# Patient Record
Sex: MALE | Race: WHITE | NOT HISPANIC OR LATINO | Employment: OTHER | URBAN - METROPOLITAN AREA
[De-identification: names, ages, dates, MRNs, and addresses within clinical notes are randomized per-mention and may not be internally consistent; named-entity substitution may affect disease eponyms.]

---

## 2017-05-17 ENCOUNTER — LAB VISIT (OUTPATIENT)
Dept: LAB | Facility: HOSPITAL | Age: 67
End: 2017-05-17
Attending: FAMILY MEDICINE
Payer: COMMERCIAL

## 2017-05-17 DIAGNOSIS — E78.5 HYPERLIPIDEMIA, UNSPECIFIED HYPERLIPIDEMIA TYPE: ICD-10-CM

## 2017-05-17 LAB
ALBUMIN SERPL BCP-MCNC: 3.9 G/DL
ALP SERPL-CCNC: 62 U/L
ALT SERPL W/O P-5'-P-CCNC: 24 U/L
ANION GAP SERPL CALC-SCNC: 8 MMOL/L
AST SERPL-CCNC: 19 U/L
BILIRUB SERPL-MCNC: 0.6 MG/DL
BUN SERPL-MCNC: 21 MG/DL
CALCIUM SERPL-MCNC: 9.2 MG/DL
CHLORIDE SERPL-SCNC: 109 MMOL/L
CHOLEST/HDLC SERPL: 4.8 {RATIO}
CO2 SERPL-SCNC: 25 MMOL/L
CREAT SERPL-MCNC: 0.9 MG/DL
EST. GFR  (AFRICAN AMERICAN): >60 ML/MIN/1.73 M^2
EST. GFR  (NON AFRICAN AMERICAN): >60 ML/MIN/1.73 M^2
GLUCOSE SERPL-MCNC: 98 MG/DL
HDL/CHOLESTEROL RATIO: 20.8 %
HDLC SERPL-MCNC: 168 MG/DL
HDLC SERPL-MCNC: 35 MG/DL
LDLC SERPL CALC-MCNC: 114 MG/DL
NONHDLC SERPL-MCNC: 133 MG/DL
POTASSIUM SERPL-SCNC: 3.9 MMOL/L
PROT SERPL-MCNC: 6.8 G/DL
SODIUM SERPL-SCNC: 142 MMOL/L
TRIGL SERPL-MCNC: 95 MG/DL

## 2017-05-17 PROCEDURE — 80061 LIPID PANEL: CPT

## 2017-05-17 PROCEDURE — 36415 COLL VENOUS BLD VENIPUNCTURE: CPT

## 2017-05-17 PROCEDURE — 80053 COMPREHEN METABOLIC PANEL: CPT

## 2017-05-27 DIAGNOSIS — I10 HYPERTENSION, ESSENTIAL, BENIGN: Chronic | ICD-10-CM

## 2017-05-29 RX ORDER — AMLODIPINE AND BENAZEPRIL HYDROCHLORIDE 10; 40 MG/1; MG/1
1 CAPSULE ORAL DAILY
Qty: 90 CAPSULE | Refills: 1 | Status: SHIPPED | OUTPATIENT
Start: 2017-05-29 | End: 2017-11-24 | Stop reason: SDUPTHER

## 2017-05-30 ENCOUNTER — OFFICE VISIT (OUTPATIENT)
Dept: INTERNAL MEDICINE | Facility: CLINIC | Age: 67
End: 2017-05-30
Payer: COMMERCIAL

## 2017-05-30 VITALS
DIASTOLIC BLOOD PRESSURE: 80 MMHG | TEMPERATURE: 98 F | OXYGEN SATURATION: 93 % | BODY MASS INDEX: 29.76 KG/M2 | WEIGHT: 185.19 LBS | HEART RATE: 65 BPM | SYSTOLIC BLOOD PRESSURE: 152 MMHG | RESPIRATION RATE: 16 BRPM | HEIGHT: 66 IN

## 2017-05-30 DIAGNOSIS — Z72.0 TOBACCO USE: Chronic | ICD-10-CM

## 2017-05-30 DIAGNOSIS — I10 HYPERTENSION, ESSENTIAL, BENIGN: Primary | Chronic | ICD-10-CM

## 2017-05-30 DIAGNOSIS — E78.5 HYPERLIPIDEMIA WITH TARGET LOW DENSITY LIPOPROTEIN (LDL) CHOLESTEROL LESS THAN 160 MG/DL: Chronic | ICD-10-CM

## 2017-05-30 PROCEDURE — 99214 OFFICE O/P EST MOD 30 MIN: CPT | Mod: S$GLB,,, | Performed by: NURSE PRACTITIONER

## 2017-05-30 PROCEDURE — 99999 PR PBB SHADOW E&M-EST. PATIENT-LVL III: CPT | Mod: PBBFAC,,, | Performed by: NURSE PRACTITIONER

## 2017-05-30 NOTE — PATIENT INSTRUCTIONS
Continue current blood pressure medications and keep log of pressures. Check blood pressure 30-45 minutes after taking metoprolol (BP and heart rate). Bring to next appointment.
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
## 2017-06-05 ENCOUNTER — PATIENT MESSAGE (OUTPATIENT)
Dept: ADMINISTRATIVE | Facility: OTHER | Age: 67
End: 2017-06-05

## 2017-06-07 ENCOUNTER — TELEPHONE (OUTPATIENT)
Dept: FAMILY MEDICINE | Facility: CLINIC | Age: 67
End: 2017-06-07

## 2017-06-07 DIAGNOSIS — I10 ESSENTIAL HYPERTENSION: Primary | ICD-10-CM

## 2017-06-07 RX ORDER — HYDROCHLOROTHIAZIDE 12.5 MG/1
12.5 TABLET ORAL DAILY
Qty: 30 TABLET | Refills: 0 | Status: SHIPPED | OUTPATIENT
Start: 2017-06-07 | End: 2017-07-04 | Stop reason: SDUPTHER

## 2017-06-07 NOTE — TELEPHONE ENCOUNTER
----- Message from Kristi Alexis NP sent at 6/7/2017  8:58 AM CDT -----  Regarding: notify patient please  Please call Mr. Blackmon today ([phone]) and notify him that I reviewed his BP log, discussed with Dr. Chávez, and have decided to add a small dose of diuretic to his HTN medications. HCTZ 12.5 daily and please schedule him to follow up with me in 2 weeks for BP review.     Thank you  SANTHOSH Bravo

## 2017-06-21 ENCOUNTER — OFFICE VISIT (OUTPATIENT)
Dept: INTERNAL MEDICINE | Facility: CLINIC | Age: 67
End: 2017-06-21
Payer: MEDICARE

## 2017-06-21 VITALS
HEART RATE: 63 BPM | TEMPERATURE: 99 F | DIASTOLIC BLOOD PRESSURE: 75 MMHG | SYSTOLIC BLOOD PRESSURE: 136 MMHG | BODY MASS INDEX: 29.83 KG/M2 | HEIGHT: 66 IN | WEIGHT: 185.63 LBS

## 2017-06-21 DIAGNOSIS — I10 HYPERTENSION, ESSENTIAL, BENIGN: Primary | Chronic | ICD-10-CM

## 2017-06-21 PROCEDURE — 99213 OFFICE O/P EST LOW 20 MIN: CPT | Mod: S$PBB,,, | Performed by: NURSE PRACTITIONER

## 2017-06-21 PROCEDURE — 99213 OFFICE O/P EST LOW 20 MIN: CPT | Mod: PBBFAC | Performed by: NURSE PRACTITIONER

## 2017-06-21 PROCEDURE — 99999 PR PBB SHADOW E&M-EST. PATIENT-LVL III: CPT | Mod: PBBFAC,,, | Performed by: NURSE PRACTITIONER

## 2017-06-21 NOTE — PROGRESS NOTES
Subjective:       Patient ID: Neymar Victoria is a 66 y.o. male.    Chief Complaint: Follow-up (2 week follow up blood pressure)    Patient presents to clinic today for followup of HTN. He brings in a home log with pressures ranging -144 and DBP 66-77. He denies headache, chest pain, or vision changes.         Review of Systems   Constitutional: Negative for chills, fatigue, fever and unexpected weight change.   Eyes: Negative for visual disturbance.   Respiratory: Negative for shortness of breath.    Cardiovascular: Negative for chest pain.   Musculoskeletal: Negative for myalgias.   Neurological: Negative for headaches.       Objective:      Physical Exam   Constitutional: He is oriented to person, place, and time. He appears well-developed and well-nourished. No distress.   HENT:   Head: Normocephalic and atraumatic.   Cardiovascular: Normal rate and regular rhythm.  Exam reveals no friction rub.    No murmur heard.  Pulmonary/Chest: Effort normal and breath sounds normal.   Neurological: He is alert and oriented to person, place, and time.   Skin: He is not diaphoretic.   Psychiatric: He has a normal mood and affect.   Vitals reviewed.      Assessment:       1. Hypertension, essential, benign        Plan:   Hypertension, essential, benign  Comments:  home readings average shows controlled HTN, continue current medications    Other orders  -     Cancel: zoster vaccine live, PF, (ZOSTAVAX, PF,) 19,400 unit/0.65 mL injection; Inject 19,400 Units into the skin once.  Dispense: 1 vial; Refill: 0      Discussed BP goals. Patient reports a history of not tolerating increased doses of HCTZ.   Return if symptoms worsen or fail to improve, for nurse visit for BP and to correlate home readings.

## 2017-07-04 ENCOUNTER — PATIENT MESSAGE (OUTPATIENT)
Dept: ADMINISTRATIVE | Facility: OTHER | Age: 67
End: 2017-07-04

## 2017-07-04 DIAGNOSIS — I10 ESSENTIAL HYPERTENSION: ICD-10-CM

## 2017-07-05 ENCOUNTER — CLINICAL SUPPORT (OUTPATIENT)
Dept: INTERNAL MEDICINE | Facility: CLINIC | Age: 67
End: 2017-07-05
Payer: MEDICARE

## 2017-07-05 VITALS — SYSTOLIC BLOOD PRESSURE: 140 MMHG | HEART RATE: 70 BPM | DIASTOLIC BLOOD PRESSURE: 64 MMHG

## 2017-07-05 PROCEDURE — 99211 OFF/OP EST MAY X REQ PHY/QHP: CPT | Mod: PBBFAC

## 2017-07-05 PROCEDURE — 99999 PR PBB SHADOW E&M-EST. PATIENT-LVL I: CPT | Mod: PBBFAC,,,

## 2017-07-05 RX ORDER — HYDROCHLOROTHIAZIDE 12.5 MG/1
12.5 TABLET ORAL DAILY
Qty: 30 TABLET | Refills: 11 | Status: SHIPPED | OUTPATIENT
Start: 2017-07-05 | End: 2018-02-26 | Stop reason: SDUPTHER

## 2017-07-05 NOTE — PROGRESS NOTES
Patient in today for recheck of blood pressure due to recent elevated reading. Blood pressure is 140/64 and pulse is 70. Patient did bring log of home readings that range from 116-142/61-75. Patient did bring in home blood pressure machine and reading was 168/82 with a pulse of 72. Patient denies any headaches,chest pain, shortness of breath or other symptoms. Patient states he will need a refill of Hydrochlorothiazide if he will be staying on the same dosage. Please advise.

## 2017-08-14 ENCOUNTER — OFFICE VISIT (OUTPATIENT)
Dept: OPHTHALMOLOGY | Facility: CLINIC | Age: 67
End: 2017-08-14
Payer: MEDICARE

## 2017-08-14 DIAGNOSIS — H52.7 REFRACTIVE ERROR: ICD-10-CM

## 2017-08-14 DIAGNOSIS — H25.13 NUCLEAR SCLEROSIS, BILATERAL: Primary | ICD-10-CM

## 2017-08-14 DIAGNOSIS — H04.123 DRY EYE SYNDROME, BILATERAL: ICD-10-CM

## 2017-08-14 PROCEDURE — 92014 COMPRE OPH EXAM EST PT 1/>: CPT | Mod: S$PBB,,, | Performed by: OPHTHALMOLOGY

## 2017-08-14 PROCEDURE — 99999 PR PBB SHADOW E&M-EST. PATIENT-LVL II: CPT | Mod: PBBFAC,,, | Performed by: OPHTHALMOLOGY

## 2017-08-14 PROCEDURE — 99212 OFFICE O/P EST SF 10 MIN: CPT | Mod: PBBFAC | Performed by: OPHTHALMOLOGY

## 2017-08-14 NOTE — PROGRESS NOTES
HPI     Patient is here for his annual exam , patient states he uses Systane prn   for dry eyes., patient declined manifest today he got new rx last year.        COAG Suspect  FXHX Macula Degeneration  Cataracts OU  Dry Eyes     OU Systane PRN     Last edited by ANEUDY Rodriguez on 8/14/2017  8:22 AM. (History)            Assessment /Plan     For exam results, see Encounter Report.      ICD-10-CM ICD-9-CM    1. Nuclear sclerosis, bilateral H25.13 366.16   You were found to have an early cataract in your eye(s) today, however the cataract is not affecting your activities of daily living, such as reading and driving.You do not need  surgery at this time. We will recheck your cataract at your next visit. You are welcome to call for an earlier appointment if your vision gets worse.      2. Dry eye syndrome, bilateral H04.123 375.15 Findings and symptoms consistent with mild dry eyes.   Recommend regular use of Artificial Tears. These should be thought of as Ocular Surface Moisturizers similar to skin moisturizers in that regular use is required to achieve maximum benefit.  Specifically, I recommend the following:    Systane Ultra 3-4 times a day.   The first dose upon awakening is most important because we do not make tears at night.  Avoid generic products as they contain Benzalkonium Chloride as a preservative. This is a very irritating chemical and can make your eyes worse.       3. Refractive error H52.7 367.9        Return to clinic 1 year

## 2017-08-17 ENCOUNTER — PATIENT MESSAGE (OUTPATIENT)
Dept: INTERNAL MEDICINE | Facility: CLINIC | Age: 67
End: 2017-08-17

## 2017-08-17 ENCOUNTER — OFFICE VISIT (OUTPATIENT)
Dept: INTERNAL MEDICINE | Facility: CLINIC | Age: 67
End: 2017-08-17
Payer: MEDICARE

## 2017-08-17 VITALS
OXYGEN SATURATION: 99 % | TEMPERATURE: 97 F | BODY MASS INDEX: 30.16 KG/M2 | DIASTOLIC BLOOD PRESSURE: 80 MMHG | SYSTOLIC BLOOD PRESSURE: 150 MMHG | HEIGHT: 66 IN | HEART RATE: 73 BPM | WEIGHT: 187.63 LBS

## 2017-08-17 DIAGNOSIS — Z71.89 ENCOUNTER FOR MEDICATION REVIEW AND COUNSELING: ICD-10-CM

## 2017-08-17 DIAGNOSIS — I10 HYPERTENSION, ESSENTIAL, BENIGN: Primary | Chronic | ICD-10-CM

## 2017-08-17 PROCEDURE — 99214 OFFICE O/P EST MOD 30 MIN: CPT | Mod: S$PBB,,, | Performed by: NURSE PRACTITIONER

## 2017-08-17 PROCEDURE — 99999 PR PBB SHADOW E&M-EST. PATIENT-LVL IV: CPT | Mod: PBBFAC,,, | Performed by: NURSE PRACTITIONER

## 2017-08-17 PROCEDURE — 3077F SYST BP >= 140 MM HG: CPT | Mod: ,,, | Performed by: NURSE PRACTITIONER

## 2017-08-17 PROCEDURE — 3079F DIAST BP 80-89 MM HG: CPT | Mod: ,,, | Performed by: NURSE PRACTITIONER

## 2017-08-17 PROCEDURE — 99214 OFFICE O/P EST MOD 30 MIN: CPT | Mod: PBBFAC | Performed by: NURSE PRACTITIONER

## 2017-08-17 NOTE — TELEPHONE ENCOUNTER
Does he mean HCTZ? He should really be seen since his BP has not been controlled recently without the diuretic.

## 2017-08-17 NOTE — PROGRESS NOTES
Subjective:       Patient ID: Neymar Victoria is a 67 y.o. male.    Chief Complaint: Medication Problem (check all)    Patient presents to clinic today for followup of HTN. He reports a history of problems/side effects with HCTZ. He was restarted on HCTZ by me 5/30/17 due to uncontrolled HTN. BP after that were controlled. However, in the past 3-4 weeks he has begun experiencing dry eyes, body aches, dry skin, vision changes and dry mouth. He stopped taking the HCTZ for 2-3 days and side effects resolved but his pressure in that time was -150 and DBP 70s. He resumed HCTZ and pressures within 24 normalized to 120-130/60-70. He had an episode last night of extreme dry mouth and so did not take HCTZ today.   He has recently enrolled in the digital hypertension program and received his cuff today. Surveys completed via [employer].       Review of Systems   Constitutional: Negative for chills, fatigue, fever and unexpected weight change.   Eyes: Negative for visual disturbance.   Respiratory: Negative for shortness of breath.    Cardiovascular: Negative for chest pain.   Musculoskeletal: Negative for myalgias.   Neurological: Negative for headaches.       Objective:      Physical Exam   Constitutional: He is oriented to person, place, and time. He appears well-developed and well-nourished. No distress.   HENT:   Head: Normocephalic and atraumatic.   Cardiovascular: Normal rate and regular rhythm.  Exam reveals no friction rub.    No murmur heard.  Pulmonary/Chest: Effort normal and breath sounds normal.   Neurological: He is alert and oriented to person, place, and time.   Skin: He is not diaphoretic.   Psychiatric: He has a normal mood and affect.   Vitals reviewed.      Assessment:       1. Hypertension, essential, benign    2. Encounter for medication review and counseling        Plan:   Hypertension, essential, benign  -     Hypertension Digital Medicine (HDMP) Enrollment Order  -     Hypertension Digital Medicine  (HDMP): Assign Onboarding Questionnaires  -     Ambulatory referral to Cardiology    Encounter for medication review and counseling      Resume HCTZ due to elevated BP. If symptoms return, may d/c until sees cardiology. Seek immediate medical attention for persistent elevated BP. Patient requested refill of sildenafil but I will wait until he sees cardiology before refilling.   Advised to increase water and decrease salt intake.   Patient verbalized understanding and agreement.   Return if symptoms worsen or fail to improve, for keep routine follow up.

## 2017-08-23 ENCOUNTER — PATIENT MESSAGE (OUTPATIENT)
Dept: ADMINISTRATIVE | Facility: OTHER | Age: 67
End: 2017-08-23

## 2017-08-24 ENCOUNTER — CLINICAL SUPPORT (OUTPATIENT)
Dept: CARDIOLOGY | Facility: CLINIC | Age: 67
End: 2017-08-24
Payer: MEDICARE

## 2017-08-24 ENCOUNTER — OFFICE VISIT (OUTPATIENT)
Dept: CARDIOLOGY | Facility: CLINIC | Age: 67
End: 2017-08-24
Payer: MEDICARE

## 2017-08-24 VITALS
WEIGHT: 186.19 LBS | DIASTOLIC BLOOD PRESSURE: 70 MMHG | HEIGHT: 66 IN | BODY MASS INDEX: 29.92 KG/M2 | HEART RATE: 63 BPM | SYSTOLIC BLOOD PRESSURE: 130 MMHG

## 2017-08-24 DIAGNOSIS — I10 HYPERTENSION, ESSENTIAL, BENIGN: Primary | Chronic | ICD-10-CM

## 2017-08-24 DIAGNOSIS — E78.5 HYPERLIPIDEMIA WITH TARGET LOW DENSITY LIPOPROTEIN (LDL) CHOLESTEROL LESS THAN 160 MG/DL: Chronic | ICD-10-CM

## 2017-08-24 DIAGNOSIS — I10 ESSENTIAL HYPERTENSION: ICD-10-CM

## 2017-08-24 DIAGNOSIS — I10 ESSENTIAL HYPERTENSION: Primary | ICD-10-CM

## 2017-08-24 DIAGNOSIS — Z72.0 TOBACCO USE: Chronic | ICD-10-CM

## 2017-08-24 PROCEDURE — 93010 ELECTROCARDIOGRAM REPORT: CPT | Mod: S$PBB,,, | Performed by: NUCLEAR MEDICINE

## 2017-08-24 PROCEDURE — 93005 ELECTROCARDIOGRAM TRACING: CPT | Mod: PBBFAC | Performed by: NUCLEAR MEDICINE

## 2017-08-24 PROCEDURE — 99999 PR PBB SHADOW E&M-EST. PATIENT-LVL III: CPT | Mod: PBBFAC,,, | Performed by: INTERNAL MEDICINE

## 2017-08-24 PROCEDURE — 99204 OFFICE O/P NEW MOD 45 MIN: CPT | Mod: S$PBB,,, | Performed by: INTERNAL MEDICINE

## 2017-08-24 PROCEDURE — 3075F SYST BP GE 130 - 139MM HG: CPT | Mod: ,,, | Performed by: INTERNAL MEDICINE

## 2017-08-24 PROCEDURE — 3078F DIAST BP <80 MM HG: CPT | Mod: ,,, | Performed by: INTERNAL MEDICINE

## 2017-08-24 NOTE — PROGRESS NOTES
"Subjective:    Patient ID:  Neymar Victoria is a 67 y.o. male who presents for evaluation of Hypertension    PT REFERRED BY BRAYDON MARTÍNEZ      HPI Pt referred for HTN.  Pt has HTN, was controlled on HCTZ but had some issues with it involving dry mouth, dry skin, aches, and held if and sxs improved.  Enrolled in digital HTN program recently.  ecg today shows NSR, incomplete RBBB.  Smokes 1/2 ppd.  No chest pain sxs or dyspnea.   Now back on HCTZ and seems ok other than a dry mouth.  No exercise.  Eats too much salt.       Patient Active Problem List   Diagnosis    Hypertension, essential, benign    Hyperlipidemia with target low density lipoprotein (LDL) cholesterol less than 160 mg/dL    Seasonal allergies    Tobacco use    Colon polyps     Past Medical History:   Diagnosis Date    Arthritis     Erectile dysfunction     Hyperlipidemia LDL goal < 160     Hypertension     Seasonal allergies        Current Outpatient Prescriptions:     amlodipine-benazepril (LOTREL) 10-40 mg per capsule, TAKE 1 CAPSULE BY MOUTH ONCE DAILY., Disp: 90 capsule, Rfl: 1    hydrochlorothiazide (HYDRODIURIL) 12.5 MG Tab, TAKE 1 TABLET (12.5 MG TOTAL) BY MOUTH ONCE DAILY., Disp: 30 tablet, Rfl: 11    metoprolol succinate (TOPROL-XL) 200 MG 24 hr tablet, Take 1 tablet (200 mg total) by mouth every evening., Disp: 90 tablet, Rfl: 3    PROPYLENE GLYCOL/ (SYSTANE OPHT), Apply to eye., Disp: , Rfl:     sildenafil (REVATIO) 20 mg Tab, Take 20 mg by mouth as needed., Disp: , Rfl:          Review of Systems   Constitution: Negative.   HENT: Negative.    Eyes: Negative.    Cardiovascular: Negative.    Respiratory: Negative.    Endocrine: Negative.    Hematologic/Lymphatic: Negative.    Skin: Negative.    Musculoskeletal: Negative.    Gastrointestinal: Negative.    Genitourinary: Negative.    Neurological: Negative.    Psychiatric/Behavioral: Negative.    Allergic/Immunologic: Negative.        /70   Pulse 63   Ht 5' 6" " (1.676 m)   Wt 84.5 kg (186 lb 2.9 oz)   BMI 30.05 kg/m²     Wt Readings from Last 3 Encounters:   08/24/17 84.5 kg (186 lb 2.9 oz)   08/17/17 85.1 kg (187 lb 9.8 oz)   06/21/17 84.2 kg (185 lb 10 oz)     Temp Readings from Last 3 Encounters:   08/17/17 97.2 °F (36.2 °C) (Tympanic)   06/21/17 98.5 °F (36.9 °C) (Tympanic)   05/30/17 97.8 °F (36.6 °C) (Tympanic)     BP Readings from Last 3 Encounters:   08/24/17 130/70   08/17/17 (!) 150/80   07/05/17 (!) 140/64     Pulse Readings from Last 3 Encounters:   08/24/17 63   08/17/17 73   07/05/17 70          Objective:    Physical Exam   Constitutional: He is oriented to person, place, and time. He appears well-developed and well-nourished.   HENT:   Head: Normocephalic.   Neck: Normal range of motion. Neck supple. Normal carotid pulses, no hepatojugular reflux and no JVD present. Carotid bruit is not present. No thyromegaly present.   Cardiovascular: Normal rate, regular rhythm, S1 normal and S2 normal.  PMI is not displaced.  Exam reveals no S3, no S4, no distant heart sounds, no friction rub, no midsystolic click and no opening snap.    No murmur heard.  Pulses:       Radial pulses are 2+ on the right side, and 2+ on the left side.   Pulmonary/Chest: Effort normal and breath sounds normal. He has no wheezes. He has no rales.   Abdominal: Soft. Bowel sounds are normal. He exhibits no distension, no abdominal bruit, no ascites and no mass. There is no tenderness.   Musculoskeletal: He exhibits no edema.   Neurological: He is alert and oriented to person, place, and time.   Skin: Skin is warm.   Psychiatric: He has a normal mood and affect. His behavior is normal.   Nursing note and vitals reviewed.      I have reviewed all pertinent labs and cardiac studies.      Chemistry        Component Value Date/Time     05/17/2017 0744    K 3.9 05/17/2017 0744     05/17/2017 0744    CO2 25 05/17/2017 0744    BUN 21 05/17/2017 0744    CREATININE 0.9 05/17/2017 0744     GLU 98 05/17/2017 0744        Component Value Date/Time    CALCIUM 9.2 05/17/2017 0744    ALKPHOS 62 05/17/2017 0744    AST 19 05/17/2017 0744    ALT 24 05/17/2017 0744    BILITOT 0.6 05/17/2017 0744    ESTGFRAFRICA >60.0 05/17/2017 0744    EGFRNONAA >60.0 05/17/2017 0744        Lab Results   Component Value Date    WBC 12.73 (H) 11/08/2016    HGB 16.0 11/08/2016    HCT 49.0 11/08/2016    MCV 94 11/08/2016     11/08/2016     Lab Results   Component Value Date    HGBA1C 5.9 10/19/2015     Lab Results   Component Value Date    CHOL 168 05/17/2017    CHOL 198 11/08/2016    CHOL 217 (H) 10/19/2015     Lab Results   Component Value Date    HDL 35 (L) 05/17/2017    HDL 41 11/08/2016    HDL 37 (L) 10/19/2015     Lab Results   Component Value Date    LDLCALC 114.0 05/17/2017    LDLCALC 131.0 11/08/2016    LDLCALC 115.0 10/19/2015     Lab Results   Component Value Date    TRIG 95 05/17/2017    TRIG 130 11/08/2016    TRIG 325 (H) 10/19/2015     Lab Results   Component Value Date    CHOLHDL 20.8 05/17/2017    CHOLHDL 20.7 11/08/2016    CHOLHDL 17.1 (L) 10/19/2015   '        Assessment:       1. Hypertension, essential, benign    2. Tobacco use    3. Hyperlipidemia with target low density lipoprotein (LDL) cholesterol less than 160 mg/dL         Plan:             CONTINUE CURRENT HTN MEDS.  HTN MGT/GOALS DISCUSSED IN DETAIL WITH PT.  DIGITAL HTN PROGRAM INITIATED BY PCP, AND WILL MANAGE BP READINGS.  HE IS ADVISED TO STAY ON SAME MEDS NOW (SEEMS OK ON HCTZ RIGHT NOW) AND FOCUS ON DIET AND LIFESTYLE MODIFICATION INVOLVING LOW SALT DIET, DAILY EXERCISE AND TOBACCO CESSATION.  LOW SALT DIET INFO GIVEN TO PT.  F/U WITH PCP ON CONTINUED LONG TERM RISK FACTOR MODIFICATION.    F/U PRN CARDIOLOGY.

## 2017-08-24 NOTE — PATIENT INSTRUCTIONS
Low-Salt Diet  This diet removes foods that are high in salt. It also limits the amount of salt you use when cooking. It is most often used for people with high blood pressure, edema (fluid retention), and kidney, liver, or heart disease.  Table salt contains the mineral sodium. Your body needs sodium to work normally. But too much sodium can make your health problems worse. Your healthcare provider is recommending a low-salt (also called low-sodium) diet for you. Your total daily allowance of salt is 1,500 to 2,300 milligrams (mg). It is less than 1 teaspoon of table salt. This means you can have only about 500 to 700 mg of sodium at each meal. People with certain health problems should limit salt intake to the lower end of the recommended range.    When you cook, dont add much salt. If you can cook without using salt, even better. Dont add salt to your food at the table.  When shopping, read food labels. Salt is often called sodium on the label. Choose foods that are salt-free, low salt, or very low salt. Note that foods with reduced salt may not lower your salt intake enough.    Beans, potatoes, and pasta  Ok: Dry beans, split peas, lentils, potatoes, rice, macaroni, pasta, spaghetti without added salt  Avoid: Potato chips, tortilla chips, and similar products  Breads and cereals  Ok: Low-sodium breads, rolls, cereals, and cakes; low-salt crackers, matzo crackers  Avoid: Salted crackers, pretzels, popcorn, Kazakh toast, pancakes, muffins  Dairy  Ok: Milk, chocolate milk, hot chocolate mix, low-salt cheeses, and yogurt  Avoid: Processed cheese and cheese spreads; Roquefort, Camembert, and cottage cheese; buttermilk, instant breakfast drink  Desserts  Ok: Ice cream, frozen yogurt, juice bars, gelatin, cookies and pies, sugar, honey, jelly, hard candy  Avoid: Most pies, cakes and cookies prepared or processed with salt; instant pudding  Drinks  Ok: Tea, coffee, fizzy (carbonated) drinks, juices  Avoid: Flavored  coffees, electrolyte replacement drinks, sports drinks  Meats  Ok: All fresh meat, fish, poultry, low-salt tuna, eggs, egg substitute  Avoid: Smoked, pickled, brine-cured, or salted meats and fish. This includes crawford, chipped beef, corned beef, hot dogs, deli meats, ham, kosher meats, salt pork, sausage, canned tuna, salted codfish, smoked salmon, herring, sardines, or anchovies.  Seasonings and spices  Ok: Most seasonings are okay. Good substitutes for salt include: fresh herb blends, hot sauce, lemon, garlic, pressley, vinegar, dry mustard, parsley, cilantro, horseradish, tomato paste, regular margarine, mayonnaise, unsalted butter, cream cheese, vegetable oil, cream, low-salt salad dressing and gravy.  Avoid: Regular ketchup, relishes, pickles, soy sauce, teriyaki sauce, Worcestershire sauce, BBQ sauce, tartar sauce, meat tenderizer, chili sauce, regular gravy, regular salad dressing, salted butter  Soups  Ok: Low-salt soups and broths made with allowed foods  Avoid: Bouillon cubes, soups with smoked or salted meats, regular soup and broth  Vegetables  Ok: Most vegetables are okay; also low-salt tomato and vegetable juices  Avoid: Sauerkraut and other brine-soaked vegetables; pickles and other pickled vegetables; tomato juice, olives  Date Last Reviewed: 8/1/2016 © 2000-2016 [employer]. [address] [place] [address]. All rights reserved. This information is not intended as a substitute for professional medical care. Always follow your healthcare professional's instructions.        Low-Salt Choices  Eating salt (sodium) can make your body retain too much water. Excess water makes your heart work harder. Canned, packaged, and frozen foods are easy to prepare, but they are often high in sodium. Here are some ideas for low-salt foods you can easily prepare yourself.    For breakfast  · Fruit or 100% fruit juice  · Whole-wheat bread or an English muffin. Compare sodium content on  labels.  · Low-fat milk or yogurt  · Unsalted eggs  · Shredded wheat  · Corn tortillas  · Unsalted steamed rice  · Regular (not instant) hot cereal, made without salt  Stay away from:  · Sausage, crawford, and ham  · Flour tortillas  · Packaged muffins, pancakes, and biscuits  · Instant hot cereals  · Cottage cheese  For lunch and dinner  · Fresh fish, chicken, turkey, or meat--baked, broiled, or roasted without salt  · Dry beans, cooked without salt  · Tofu, stir-fried without salt  · Unsalted fresh fruit and vegetables, or frozen or canned fruit and vegetables with no added salt  Stay away from:  · Lunch or deli meat that is cured or smoked  · Cheese  · Tomato juice and catsup  · Canned vegetables, soups, and fish not labeled as no-salt-added or reduced sodium  · Packaged gravies and sauces  · Olives, pickles, and relish  · Bottled salad dressings  For snacks and desserts  · Yogurt  · Unsalted, air popped popcorn  · Unsalted nuts or seeds  Stay away from:  · Pies and cakes  · Packaged dessert mixes  · Pizza  · Canned and packaged puddings  · Pretzels, chips, crackers, and nuts--unless the label says unsalted  Date Last Reviewed: 6/17/2015  © 9811-9988 [employer]. [address] [place] [address]. All rights reserved. This information is not intended as a substitute for professional medical care. Always follow your healthcare professional's instructions.        Discharge Instructions: Eating a Low-Salt Diet  Your health care provider has prescribed a low-salt diet for you. Most people with heart problems need to eat less salt, which is full of sodium. Too much sodium is linked to high blood pressure, which is linked to a greater risk of heart disease, stroke, blindness, and kidney problems.  Home care    Learn ways to cut back on salt (sodium):  · Eat less frozen, canned, dried, packaged, and fast foods. These often contain high amounts of sodium.  · Season foods with herbs instead of salt when  you cook.  · Season with flavorings such as pepper, lemon, garlic, and onion.  · Dont add salt to your food at the table.  · Sprinkle salt-free herbal blends on meats and vegetables.  Learn to read food labels carefully:  · Look for the total amount of sodium per serving.  · Look for foods labeled low sodium, reduced sodium, or no added salt.  · Beware. Salt goes by many names. Cut down on foods with these words (all forms of salt) listed as ingredients:  ¨ Salt  ¨ Sodium  ¨ Soy sauce  ¨ Baking soda  ¨ Baking powder  ¨ MSG  ¨ Monosodium  ¨ Na (the chemical symbol for sodium)  Other ideas:  · Use more fresh food. Buy more fruits and vegetables.  · Select lean meats, fish, and poultry.  · Find a cookbook with low-salt recipes. Youll find ideas for tasty meals that are healthy for your heart.  ·   When eating out, ask questions about the menu. Tell the  you're on a low-salt diet.  ¨ If you order fish, chicken, beef, or pork, ask the  to have it broiled, baked, poached, or grilled without salt, butter, or breading.  ¨ Choose plain steamed rice, boiled noodles, and baked or boiled potatoes. Top potatoes with chives and a little sour cream instead of butter.  · Avoid antacids that are high in salt. Check the label before you buy.  Follow-up  Make a follow-up appointment with a nutritionist as directed by our staff.  Date Last Reviewed: 6/20/2015 © 2000-2016 [employer]. [address] [place] [address], [place] [address]. All rights reserved. This information is not intended as a substitute for professional medical care. Always follow your healthcare professional's instructions.        Tips for Using Less Salt    Most people with heart problems need to eat less salt (sodium). Reducing the amount of salt you eat may help control your blood pressure. The higher your blood pressure, the greater your risk for heart disease, stroke, blindness, and kidney problems.  At the store  · Make low-salt choices by  reading labels carefully. Look for the total amount of sodium per serving.  · Use more fresh food. Buy more fruits and vegetables. Select lean meats, fish, and poultry.  · Use fewer frozen, canned, and packaged foods which often contain a lot of sodium.  · Use plain frozen vegetables without sauces or toppings. These products are often low- or no-sodium.  · Opt for reduced-sodium or no-salt-added versions of canned vegetables and soups.  In the kitchen  · Don't add salt to food when you're cooking. Season with flavorings such as onion, garlic, pepper, salt-free herbal blends, and lemon or lime juice.  · Use a cookbook containing low-salt recipes. It can give you ideas for tasty meals that are healthy for your heart.  · Sprinkle salt-free herbal blends on vegetables and meat.  · Drain and rinse canned foods, such as canned beans and vegetables, before cooking or eating.  Eating out  · Tell the  you're on a low-salt diet. Ask questions about the menu.  · Order fish, chicken, and meat broiled, baked, poached, or grilled without salt, butter, or breading.  · Use lemon, pepper, and salt-free herb mixes to add flavor.  · Choose plain steamed rice, boiled noodles, and baked or boiled potatoes. Top potatoes with chives and a little sour cream.     Beware! Salt goes by many other names. Limit foods with these words listed as ingredients: salt, sodium, soy sauce, baking soda, baking powder, MSG, monosodium, Na (the chemical symbol for sodium). Some antacids are also high in salt.   Date Last Reviewed: 6/19/2015  © 3843-2831 [employer]. [address] [place] [address], [place] [address]. All rights reserved. This information is not intended as a substitute for professional medical care. Always follow your healthcare professional's instructions.

## 2017-08-24 NOTE — LETTER
August 24, 2017      Kristi Alexis, NP  [address] [place] [address] [address] [address]           [facility]  [address] [place] [address] [address] [address]  Phone: [phone]  Fax: [phone]          Patient: Neymar Victoria   MR Number: [id number]   YOB: 1950   Date of Visit: 8/24/2017       Dear Kristi Alexis:    Thank you for referring Neymar Victoria to me for evaluation. Attached you will find relevant portions of my assessment and plan of care.    If you have questions, please do not hesitate to call me. I look forward to following Neymar Victoria along with you.    Sincerely,    Jason Molina MD    Enclosure  CC:  No Recipients    If you would like to receive this communication electronically, please contact [email or web address] or [phone] to request more information on [employer] Link access.    For providers and/or their staff who would like to refer a patient to [facility], please contact us through our one-stop-shop provider referral line, Elysia Goodman, at [phone].    If you feel you have received this communication in error or would no longer like to receive these types of communications, please e-mail [email or web address]

## 2017-08-28 ENCOUNTER — PATIENT OUTREACH (OUTPATIENT)
Dept: OTHER | Facility: OTHER | Age: 67
End: 2017-08-28

## 2017-08-28 NOTE — TELEPHONE ENCOUNTER
"HTN Digital Medicine Program Medication Reconciliation Outreach    Called patient to introduce him/her into the HDMP. Reviewed program details including blood pressure goals, technique for taking readings (timing, cuff placement, body positioning, iHealth jelani), and what to do in case of emergency. Introduced patient to members of care team (health , clinician, and responsible provider). Verified patient's understanding of [facility] MyChart jelani use for contacting clinical team and to ensure that iHealth cuff readings continue to transmit by logging in once every 2 weeks. Confirmation text sent and patient received.  Pt was unsure of frequency/ timing of BP readings- reviewed program expectations and pt states he will begin taking daily readings. Takes Lotrel and HCTZ in AM, Metoprolol at noon. Has had recent headaches but is unsure if they are related to medications as they do not happen regularly. Has also complained of being "dried out" recently and does not know if this is due to HCTZ.    Screening Questionnaire Review:  1. Depression - not indicated  2. Sleep apnea - yes     KOKI screening results for this patient suggest a high likelihood of sleep apnea, which can contribute to hypertension. Patient has not been previously diagnosed with sleep apnea and is not interested in referral at this time. Does not think that he snores and is not concerned about sleep apnea as a possible issue.    Patient reports the following symptoms of sleep apnea:  Pt is not aware of any issues at present.        Verified the following information with the patient:  1. Medication list  Current Outpatient Prescriptions on File Prior to Visit   Medication Sig Dispense Refill    amlodipine-benazepril (LOTREL) 10-40 mg per capsule TAKE 1 CAPSULE BY MOUTH ONCE DAILY. 90 capsule 1    hydrochlorothiazide (HYDRODIURIL) 12.5 MG Tab TAKE 1 TABLET (12.5 MG TOTAL) BY MOUTH ONCE DAILY. 30 tablet 11    metoprolol succinate (TOPROL-XL) 200 MG " 24 hr tablet Take 1 tablet (200 mg total) by mouth every evening. 90 tablet 3    PROPYLENE GLYCOL/ (SYSTANE OPHT) Apply to eye.      sildenafil (REVATIO) 20 mg Tab Take 20 mg by mouth as needed.       No current facility-administered medications on file prior to visit.        Previous ADRs  NKDA    2. Medication compliance: has been compliant with the medicaiton regimen    3. Medication Allergies: Review of patient's allergies indicates:  No Known Allergies    Explained that our goal is to get his BP consistently below 140/90mmHg.  Patient denies having further questions, concerns. BP is not at goal.       Last 5 Patient Entered Redings Current 30 Day Average: 147/76     Recent Readings 8/23/2017    Systolic BP (mmHg) 147    Diastolic BP (mmHg) 76    Pulse 69

## 2017-08-29 ENCOUNTER — PATIENT OUTREACH (OUTPATIENT)
Dept: OTHER | Facility: OTHER | Age: 67
End: 2017-08-29

## 2017-08-29 NOTE — LETTER
Gabrielle Sheth, [place]  [address] [place] [address]     Dear Neymar Victoria,    Welcome to the [facility] Hypertension Digital Medicine Program!         My name is Gabrielle Sheth PharmD and I am your dedicated Digital Medicine clinician.  As an expert in medication management, I will help ensure that the medications you are taking continue to provide you with the intended benefits.      I am Jose Antonio Rob and I will be your health  for the duration of the program.  My  job is to help you identify lifestyle changes to improve your blood pressure control.  We will talk about nutrition, exercise, and other ways that you may be able to adjust your current habits to better your health. Together, we will work to improve your overall health and encourage you to meet your goals for a healthier lifestyle.    What we expect from YOU:    You will need to take blood pressure readings multiple times a week and no less than one reading per week.   It is important that you take your measurements at different times during the day, when possible.     What you should expect from your Digital Medicine Care Team:   We will provide you with education about high blood pressure, including lifestyle changes that could help you to control your blood pressure.   We will review your weekly readings and provide you with monthly blood pressure progress reports after you have been in the program for more than 30 days.   We will send monthly progress reports on your blood pressure control to your physician so they can follow along with your progress as well.    You will be able to reach me by phone at [phone] or through your [facility] account by clicking my name under Care Team on the right side of the home screen.    I look forward to working with you to achieve your blood pressure goals!    Sincerely,    Gabrielle Sheth PharmD  Your personal clinician    Please visit [email or web address]  to learn more about high blood pressure and what you can do lower your blood pressure.                                                                                           Neymar Victoria  [address] [address] [address]

## 2017-08-29 NOTE — PROGRESS NOTES
Last 5 Patient Entered [place]indigo Current 30 Day Average: 147/76     Recent Readings 8/23/2017    Systolic BP (mmHg) 147    Diastolic BP (mmHg) 76    Pulse 69        LVM, request patient to call back- 8/29    [facility] Health  Introduction    Introduced Mr. Neymar Victoria to [facility]. Discussed health  role and recommended lifestyle modifications.    Diet (i.e. Low sodium, food labels): Patient says he loves pork chops, hamburgers, and turkey. He says he likes potato chips and tries to buy the low sodium, baked chips. He says he uses the Edgar's salt substitute. Patient says he eats a bowl of Raisin Bran or sausage biscuit with eggs. Patient says he likes to eat pizza or a ribeye steak at night. He says he drinks a lot of sweet tea.   Exercise: Patient says he rarely exercises. He says he hunts and fishes for activity. Patient says he does yard work for lite exercise. He says he has an exercise bike that he wants to bring out from storage.  Patient says he has been having contractor issues with a house and causing him additional stress.  Alcohol/Tobacco: Patient says he does not drink. He says he smokes about 1/2 a pack a day. Patient says he may smoke one cigarette every couple of hours.  Medication Adherence: has been compliant with the medicaiton regimen Patient reports no abnormal pain but reports having a VERY DRY mouth at night before bed.   Other goals: Patient wants to lose around 15 lbs.    Reviewed AHA recommendations:  Limit sodium intake to <2000mg/day  Perform 150 minutes of physical activity per week    Patient is aware of the importance of taking daily BP readings at various times of the day  Patient aware that the health  can be used as a resource for lifestyle modifications to help reduce or maintain a healthy BP  Patient is aware of the importance of medication adherence.  Patient is aware that HDMP team is not available for emergencies. Patient should call  911 or [facility] on Call if one arises.

## 2017-09-18 ENCOUNTER — PATIENT OUTREACH (OUTPATIENT)
Dept: OTHER | Facility: OTHER | Age: 67
End: 2017-09-18

## 2017-09-18 NOTE — PROGRESS NOTES
Last 5 Patient Entered Redings Current 30 Day Average: 147/82     Recent Readings 9/11/2017 9/9/2017 9/7/2017 9/5/2017 9/1/2017    Systolic BP (mmHg) 143 143 151 145 147    Diastolic BP (mmHg) 78 79 82 85 87    Pulse 66 67 62 65 65        LVM. Request patient to call back- 9/18  Patient answered requesting a phone call next Monday or Tuesday as he was busy with work- 10/2  Patient will be deer hunting starting on 10/16. Patient states it may be 2 weeks to a month. Will follow up in a month - 10/10    Hypertension Digital Medicine Program (HDMP): Health  Follow Up    Lifestyle Modifications:    1.Low sodium diet: yes Patient reports getting away from salty foods like porkchops and potato chips. He reports staying away from heavy foods and foods with a lot of salt. Patient states craving a lot of jambalaya but feels great he has not been eating it. He says he has been doing really well not eating bad food.    2.Physical activity: yes Patient reports losing 8 lbs. 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 Patient says he plans on using his exercise bike when all of his possessions are collected from another location.    3.Hypotension/Hypertension symptoms: yes Patient reports feeling lightedheadedness when he takes his medication in the morning, but, when he takes it in the afternoon he does not feel it. Patient is unsure if it is his BP med. Informed patient to track this to pinpoint the issue. Patient expressed his full understanding of this request and plans on pursuing the course of action. Patient states he will call if this continues and discovers the BP med is the problem.  Frequency/Alleviating factors/Precipitating factors, etc.     4.Patient has been compliant with the medication regimen.     Follow up with Mr. Neymar Victoria completed. No further questions or concerns. I will follow up in a few weeks to assess progress.

## 2017-10-11 ENCOUNTER — PATIENT OUTREACH (OUTPATIENT)
Dept: OTHER | Facility: OTHER | Age: 67
End: 2017-10-11

## 2017-10-11 NOTE — PROGRESS NOTES
Last 5 Patient Entered Readings Current 30 Day Average: 142/73     Recent Readings 10/16/2017 10/5/2017 9/30/2017 9/22/2017 9/20/2017    Systolic BP (mmHg) 142 134 135 146 143    Diastolic BP (mmHg) 73 75 79 75 83    Pulse 72 64 63 90 66        Called Mr. Victoria to introduce him to the Hypertension Digital Medicine Program.     Mr. Victoria has no recent readings because he has been out of town deer hunting. He goes deer hunting for about 3-4 months out of the year (October-January) for a few weeks at a time each trip. He is home for 5 days, and will be leaving Friday for another 3 weeks. He will send BP readings while he his home, but is unable to do so while away due to no internet connection. He will be back home after Thanksgiving through [place] and will follow up with the [facility] care team at this time.     Reviewed patient's medications and verified allergies on file.   Hypertension Medications             amlodipine-benazepril (LOTREL) 10-40 mg per capsule TAKE 1 CAPSULE BY MOUTH ONCE DAILY.    hydrochlorothiazide (HYDRODIURIL) 12.5 MG Tab TAKE 1 TABLET (12.5 MG TOTAL) BY MOUTH ONCE DAILY.    metoprolol succinate (TOPROL-XL) 200 MG 24 hr tablet Take 1 tablet (200 mg total) by mouth every evening.        Explained that we expect him to obtain several blood pressures/week at random times of day. Also asked that the BP be taken at least 1 hour after taking BP medications.     Explained that our goal is to get his BP to consistently below 140/90mmHg.     Patient and I agreed that the patient will take his BP daily to every other day at varying times of the day.     Emailed patient link to [facility] HTN webpage as well as my direct phone number in case he has any questions.

## 2017-11-24 DIAGNOSIS — I10 HYPERTENSION, ESSENTIAL, BENIGN: Chronic | ICD-10-CM

## 2017-11-24 RX ORDER — AMLODIPINE AND BENAZEPRIL HYDROCHLORIDE 10; 40 MG/1; MG/1
1 CAPSULE ORAL DAILY
Qty: 90 CAPSULE | Refills: 1 | Status: SHIPPED | OUTPATIENT
Start: 2017-11-24 | End: 2018-05-20 | Stop reason: SDUPTHER

## 2017-12-01 ENCOUNTER — PATIENT OUTREACH (OUTPATIENT)
Dept: OTHER | Facility: OTHER | Age: 67
End: 2017-12-01

## 2017-12-01 NOTE — PROGRESS NOTES
Last 5 Patient Entered Readings Current 30 Day Average: 134/69     Recent Readings 11/30/2017 10/16/2017 10/5/2017 9/30/2017 9/22/2017    Systolic BP (mmHg) 134 142 134 135 146    Diastolic BP (mmHg) 69 73 75 79 75    Pulse 70 72 64 63 90        12/1- LVM. Follow up attempt. Will call again on 12/15.  1/3-LVM. Requested additional BP readings. Follow up attempt #2. Will call again on 1/18.    Patient is an avid jake and is difficult to reach. He states he will be out of town hunting 1/9 thru 1/16.    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 He reports success when he is hunting as he is not thinking about it. Patient reports he may smoke when he is at home.     Hypertension Digital Medicine Program (HDMP): Health  Follow Up    Lifestyle Modifications:    1.Low sodium diet: no Patient reports having a hit or miss diet. He states he has been consuming [place] type foods. Patient reports having a lot of fried foods. He states consuming jambalaya, Thanksgiving foods, and meat. Patient reports trying to eliminate cheese. He states consuming Chinese food often. Patient reports having a sausage biscuit for breakfast.  He states he will speak to his [employer] and request to decrease the amount of beef fat added to the meat. Patient is aware of the high sodium content in his diet. Encouraged patient to monitor the food labels on the foods he and his wife are eating.     2.Physical activity: yes  Patient reports he is actively walking with deer hunting season. He states he has not been riding his stationary bike but plans on getting it soon.    3.Hypotension/Hypertension symptoms: no Patient reports no abnormal signs or symptoms with BP medication  Frequency/Alleviating factors/Precipitating factors, etc.     4.Patient has been compliant with the medication regimen.     Follow up with Mr. Neymar Victoria completed. No further questions or concerns. I will follow up in a few  weeks to assess progress.

## 2017-12-12 ENCOUNTER — PATIENT OUTREACH (OUTPATIENT)
Dept: OTHER | Facility: OTHER | Age: 67
End: 2017-12-12

## 2017-12-12 NOTE — PROGRESS NOTES
Last 5 Patient Entered Readings                                      Current 30 Day Average: 145/77     Recent Readings 1/22/2018 1/18/2018 1/10/2018 1/8/2018 1/4/2018    SBP (mmHg) 143 143 140 160 138    DBP (mmHg) 78 75 73 86 72    Pulse 73 71 60 66 59        Patient's BP average is above goal of <130/80.     Mr. Valderrama admits to eating foods higher in sodium which he understands is causing elevated BP. He is working with his health , Jose Antonio, on ways to improve his diet and decrease salt. 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     Will continue to monitor regularly. Will follow up in 4-6 weeks, sooner if BP begins to trend upward or downward.    Patient has my contact information and knows to call with any concerns or clinical changes.     Current HTN regimen:  Hypertension Medications             amlodipine-benazepril (LOTREL) 10-40 mg per capsule TAKE 1 CAPSULE BY MOUTH ONCE DAILY.    hydrochlorothiazide (HYDRODIURIL) 12.5 MG Tab TAKE 1 TABLET (12.5 MG TOTAL) BY MOUTH ONCE DAILY.    metoprolol succinate (TOPROL-XL) 200 MG 24 hr tablet Take 1 tablet (200 mg total) by mouth every evening.

## 2018-01-30 ENCOUNTER — PATIENT OUTREACH (OUTPATIENT)
Dept: OTHER | Facility: OTHER | Age: 68
End: 2018-01-30

## 2018-01-30 NOTE — PROGRESS NOTES
Last 5 Patient Entered Readings                                      Current 30 Day Average: 144/77     Recent Readings 1/29/2018 1/22/2018 1/18/2018 1/10/2018 1/8/2018    SBP (mmHg) 140 143 143 140 160    DBP (mmHg) 75 78 75 73 86    Pulse 68 73 71 60 66        1/30-LVM. Follow up attempt. Will call again on 2/16.    Hypertension Digital Medicine Program (HDMP): Health  Follow Up    Lifestyle Modifications:    1.Low sodium diet: no Patient reports he has not been doing the best with his diet. Patient reports eating chips is one of his favorite snacks. He states consuming fish is one of his favorite meals. Patient states he will try to consume more baked fish.      2.Physical activity: no Patient states he has been riding his bike but not as often as he should. He states he will start fishing soon and will increase his physical activity. Encouraged patient to ride his bike more. Patient states he will try.     3.Hypotension/Hypertension symptoms: no   Frequency/Alleviating factors/Precipitating factors, etc.     4.Patient has been compliant with the medication regimen.     Follow up with Mr. Neymar Victoria completed. No further questions or concerns. I will follow up in a few weeks to assess progress.

## 2018-02-21 DIAGNOSIS — I10 HYPERTENSION, ESSENTIAL, BENIGN: Chronic | ICD-10-CM

## 2018-02-21 RX ORDER — METOPROLOL SUCCINATE 200 MG/1
200 TABLET, EXTENDED RELEASE ORAL NIGHTLY
Qty: 90 TABLET | Refills: 1 | Status: SHIPPED | OUTPATIENT
Start: 2018-02-21 | End: 2018-08-17 | Stop reason: SDUPTHER

## 2018-02-26 ENCOUNTER — PATIENT OUTREACH (OUTPATIENT)
Dept: OTHER | Facility: OTHER | Age: 68
End: 2018-02-26

## 2018-02-26 DIAGNOSIS — I10 ESSENTIAL HYPERTENSION: ICD-10-CM

## 2018-02-26 RX ORDER — HYDROCHLOROTHIAZIDE 12.5 MG/1
25 TABLET ORAL DAILY
Qty: 30 TABLET | Refills: 3
Start: 2018-02-26 | End: 2018-03-12 | Stop reason: SDUPTHER

## 2018-02-26 NOTE — PROGRESS NOTES
Last 5 Patient Entered Readings                                      Current 30 Day Average: 140/76     Recent Readings 2/23/2018 2/20/2018 2/12/2018 2/8/2018 2/6/2018    SBP (mmHg) 123 136 151 139 150    DBP (mmHg) 75 70 80 75 81    Pulse 62 63 60 67 63        Mr. Victoria' BP average remains above goal. He does not get much physical activity due back pain. He also has a hard time finding items that he enjoys that are low in sodium. Advised he work with his health  on finding items that are good low sodium swaps for the things he enjoys.     Will increase HCTZ to 25 mg QD. He has taken this dose in the past and says it caused his eyes to dry out. He does have dry eyes and uses Systane drops PRN. Asked that he notify me if he has any concerns with the higher dose.     Will continue to monitor regularly. Will follow up in 2-3 weeks, sooner if BP begins to trend upward or downward.    Patient has my contact information and knows to call with any concerns or clinical changes.     Current HTN regimen:  Hypertension Medications             amlodipine-benazepril (LOTREL) 10-40 mg per capsule TAKE 1 CAPSULE BY MOUTH ONCE DAILY.    hydroCHLOROthiazide (HYDRODIURIL) 12.5 MG Tab Take 2 tablets (25 mg total) by mouth once daily.    metoprolol succinate (TOPROL-XL) 200 MG 24 hr tablet TAKE 1 TABLET (200 MG TOTAL) BY MOUTH EVERY EVENING.

## 2018-03-09 ENCOUNTER — PATIENT OUTREACH (OUTPATIENT)
Dept: OTHER | Facility: OTHER | Age: 68
End: 2018-03-09

## 2018-03-09 NOTE — PROGRESS NOTES
"Last 5 Patient Entered Readings                                      Current 30 Day Average: 137/75     Recent Readings 3/8/2018 3/8/2018 3/7/2018 3/5/2018 3/4/2018    SBP (mmHg) 140 147 118 147 129    DBP (mmHg) 73 74 76 77 70    Pulse 65 66 85 61 64        3/9-LVM. Follow up attempt. Will call again on 3/16.     Patient attributes his lower readings to the recent med changes.     Patient states picking up a stomach virus yesterday so he has been feeling bad today.     Hypertension Digital Medicine Program (HDMP): Health  Follow Up    Lifestyle Modifications:    1.Low sodium diet: no Patient states he has been able to "mostly stay away from pizza and chinese food." Patient reports not liking fruit. Patient states enjoying vegetables such as green beans, peas, and carrots. He states buying frozen vegetables often. Patient reports he has cut back on buying canned vegetables. Patient inquired about what items are considered low-sodium. Informed him items 140 mg or less are low sodium. Patient states using low sodium Edgar C's. He states liking fish but has not been eating it as much as he should. Patient reports enjoying peanut butter. Patient reports eating potato chips maybe once a chips. He states consuming potatoes often. Patient states not consuming pasta. Patient states eating a lot of deli meat.     2.Physical activity: yes Patient reports riding his bike when he is not working around the camp. Patient reports having arthritis in arms and neck. Encouraged patient to stay with riding his bike or walking to reduce strain on arms. Patient states having pain when weed eating and riding his . He states riding his bike 3-4x week. He states working around 10-11 hours around the [place].      3.Hypotension/Hypertension symptoms: no Patient states having no abnormal signs or symptoms with BP medication.   Frequency/Alleviating factors/Precipitating factors, etc.     4.Patient has been compliant with the " medication regimen.     Follow up with Mr. Blackmon GRETCHEN Julien completed. No further questions or concerns. I will follow up in a few weeks to assess progress.

## 2018-03-12 ENCOUNTER — PATIENT OUTREACH (OUTPATIENT)
Dept: OTHER | Facility: OTHER | Age: 68
End: 2018-03-12

## 2018-03-12 DIAGNOSIS — I10 ESSENTIAL HYPERTENSION: ICD-10-CM

## 2018-03-12 RX ORDER — HYDROCHLOROTHIAZIDE 25 MG/1
25 TABLET ORAL DAILY
Qty: 90 TABLET | Refills: 1 | Status: SHIPPED | OUTPATIENT
Start: 2018-03-12 | End: 2018-09-03 | Stop reason: SDUPTHER

## 2018-03-12 NOTE — PROGRESS NOTES
Last 5 Patient Entered Readings                                      Current 30 Day Average: 135/74     Recent Readings 3/9/2018 3/9/2018 3/8/2018 3/8/2018 3/7/2018    SBP (mmHg) 124 127 140 147 118    DBP (mmHg) 71 75 73 74 76    Pulse 63 63 65 66 85        Mr. Victoria' BP is improving since increasing HCTZ from 12.5 mg QD to 25 mg QD. He was having a hard time adjusting the first week as BP was dropping and he was feeling tired. He feels better now and says things have stabilized. Will check BMP in 2 weeks.     Will continue to monitor regularly. Will follow up in 2-3 weeks, sooner if BP begins to trend upward or downward.    Patient has my contact information and knows to call with any concerns or clinical changes.     Current HTN regimen:  Hypertension Medications             amlodipine-benazepril (LOTREL) 10-40 mg per capsule TAKE 1 CAPSULE BY MOUTH ONCE DAILY.    hydroCHLOROthiazide (HYDRODIURIL) 25 MG tablet Take 1 tablet (25 mg total) by mouth once daily.    metoprolol succinate (TOPROL-XL) 200 MG 24 hr tablet TAKE 1 TABLET (200 MG TOTAL) BY MOUTH EVERY EVENING.

## 2018-03-23 ENCOUNTER — TELEPHONE (OUTPATIENT)
Dept: INTERNAL MEDICINE | Facility: CLINIC | Age: 68
End: 2018-03-23

## 2018-03-23 NOTE — TELEPHONE ENCOUNTER
Left message to notify patient Dr. Chávez would not be in office on Monday MORNING, March 26, 2018. Patient was advised to reschedule the appointment with Dr. Chávez or a partner in Practice, (Dr. Olga Alexis or Ninoska Alexis, [facility]).        For time being, rescheduled pt to 1:00 with [facility] for annual.  Left message stating this.

## 2018-03-26 ENCOUNTER — LAB VISIT (OUTPATIENT)
Dept: LAB | Facility: HOSPITAL | Age: 68
End: 2018-03-26
Attending: FAMILY MEDICINE
Payer: MEDICARE

## 2018-03-26 DIAGNOSIS — I10 ESSENTIAL HYPERTENSION: ICD-10-CM

## 2018-03-26 LAB
ANION GAP SERPL CALC-SCNC: 9 MMOL/L
BUN SERPL-MCNC: 23 MG/DL
CALCIUM SERPL-MCNC: 10 MG/DL
CHLORIDE SERPL-SCNC: 105 MMOL/L
CO2 SERPL-SCNC: 25 MMOL/L
CREAT SERPL-MCNC: 1.1 MG/DL
EST. GFR  (AFRICAN AMERICAN): >60 ML/MIN/1.73 M^2
EST. GFR  (NON AFRICAN AMERICAN): >60 ML/MIN/1.73 M^2
GLUCOSE SERPL-MCNC: 103 MG/DL
POTASSIUM SERPL-SCNC: 4.1 MMOL/L
SODIUM SERPL-SCNC: 139 MMOL/L

## 2018-03-26 PROCEDURE — 80048 BASIC METABOLIC PNL TOTAL CA: CPT

## 2018-03-26 PROCEDURE — 36415 COLL VENOUS BLD VENIPUNCTURE: CPT

## 2018-03-27 ENCOUNTER — OFFICE VISIT (OUTPATIENT)
Dept: INTERNAL MEDICINE | Facility: CLINIC | Age: 68
End: 2018-03-27
Payer: MEDICARE

## 2018-03-27 VITALS
HEART RATE: 62 BPM | TEMPERATURE: 98 F | BODY MASS INDEX: 28.56 KG/M2 | DIASTOLIC BLOOD PRESSURE: 72 MMHG | SYSTOLIC BLOOD PRESSURE: 124 MMHG | WEIGHT: 182 LBS | OXYGEN SATURATION: 97 % | HEIGHT: 67 IN

## 2018-03-27 DIAGNOSIS — Z13.6 ENCOUNTER FOR ABDOMINAL AORTIC ANEURYSM (AAA) SCREENING: ICD-10-CM

## 2018-03-27 DIAGNOSIS — Z72.0 TOBACCO USE: Chronic | ICD-10-CM

## 2018-03-27 DIAGNOSIS — E78.5 HYPERLIPIDEMIA WITH TARGET LOW DENSITY LIPOPROTEIN (LDL) CHOLESTEROL LESS THAN 160 MG/DL: Chronic | ICD-10-CM

## 2018-03-27 DIAGNOSIS — I10 HYPERTENSION, ESSENTIAL, BENIGN: Chronic | ICD-10-CM

## 2018-03-27 DIAGNOSIS — Z00.00 ROUTINE GENERAL MEDICAL EXAMINATION AT A HEALTH CARE FACILITY: Primary | ICD-10-CM

## 2018-03-27 DIAGNOSIS — Z23 NEED FOR PNEUMOCOCCAL VACCINATION: ICD-10-CM

## 2018-03-27 PROCEDURE — 99213 OFFICE O/P EST LOW 20 MIN: CPT | Mod: S$PBB,,, | Performed by: NURSE PRACTITIONER

## 2018-03-27 PROCEDURE — G0009 ADMIN PNEUMOCOCCAL VACCINE: HCPCS | Mod: PBBFAC

## 2018-03-27 PROCEDURE — 99999 PR PBB SHADOW E&M-EST. PATIENT-LVL IV: CPT | Mod: PBBFAC,,, | Performed by: NURSE PRACTITIONER

## 2018-03-27 PROCEDURE — 99214 OFFICE O/P EST MOD 30 MIN: CPT | Mod: PBBFAC,25 | Performed by: NURSE PRACTITIONER

## 2018-03-27 NOTE — PROGRESS NOTES
Neymar Victoria  03/27/2018  [id number]    Susan Chávez MD  Patient Care Team:  Susan Chávez MD as PCP - General (Family Medicine)  Gabrielle Sheth PharmD as Hypertension Digital Medicine Clinician (Pharmacist)  Jose Antonio Rob as Digital Medicine Health   Susan Chávez MD as Hypertension Digital Medicine Responsible Provider (Family Medicine)  Has the patient seen any provider outside of the [facility] network since the last visit? (no). If yes, HIPPA forms completed and records requested.        Visit Type:a scheduled routine follow-up visit    Chief Complaint:  Chief Complaint   Patient presents with    Annual Exam       History of Present Illness:    Patient presents to clinic today for annual physical exam.      History:  Past Medical History:   Diagnosis Date    Arthritis     Erectile dysfunction     Hyperlipidemia LDL goal < 160     Hypertension     Seasonal allergies      Past Surgical History:   Procedure Laterality Date    COLONOSCOPY N/A 2/29/2016    Procedure: COLONOSCOPY;  Surgeon: Aruna Rocha MD;  Location: [facility];  Service: Endoscopy;  Laterality: N/A;    WISDOM TOOTH EXTRACTION       Family History   Problem Relation Age of Onset    Macular degeneration Mother     Melanoma Father     Hypertension      Stroke Paternal Grandfather     Psoriasis Neg Hx     Lupus Neg Hx      Social History     Social History    Marital status:      Spouse name: N/A    Number of children: N/A    Years of education: N/A     Occupational History    Not on file.     Social History Main Topics    Smoking status: Current Every Day Smoker     Packs/day: 0.50     Years: 30.00     Types: Cigarettes    Smokeless tobacco: Never Used    Alcohol use 0.0 oz/week      Comment: social use of alchol    Drug use: No    Sexual activity: Not on file     Other Topics Concern    Not on file     Social History Narrative    No narrative on file     Patient Active Problem List    Diagnosis    Hypertension, essential, benign    Hyperlipidemia with target low density lipoprotein (LDL) cholesterol less than 160 mg/dL    Seasonal allergies    Tobacco use    Colon polyps     Review of patient's allergies indicates:  No Known Allergies    The following were reviewed at this visit: active problem list, medication list, allergies, family history, social history, and health maintenance.    Medications:  Current Outpatient Prescriptions on File Prior to Visit   Medication Sig Dispense Refill    amlodipine-benazepril (LOTREL) 10-40 mg per capsule TAKE 1 CAPSULE BY MOUTH ONCE DAILY. 90 capsule 1    hydroCHLOROthiazide (HYDRODIURIL) 25 MG tablet Take 1 tablet (25 mg total) by mouth once daily. 90 tablet 1    metoprolol succinate (TOPROL-XL) 200 MG 24 hr tablet TAKE 1 TABLET (200 MG TOTAL) BY MOUTH EVERY EVENING. 90 tablet 1    PROPYLENE GLYCOL/ (SYSTANE OPHT) Apply to eye.      sildenafil (REVATIO) 20 mg Tab Take 20 mg by mouth as needed.       No current facility-administered medications on file prior to visit.        Medications have been reviewed and reconciled with patient at this visit.  Barriers to medications present (no)    Adverse reactions to current medications (no)    Over the counter medications reviewed (Yes ), and if needed added to active Medication list at this visit.     Exam:  Wt Readings from Last 3 Encounters:   03/27/18 82.6 kg (182 lb)   08/24/17 84.5 kg (186 lb 2.9 oz)   08/17/17 85.1 kg (187 lb 9.8 oz)     Temp Readings from Last 3 Encounters:   03/27/18 97.7 °F (36.5 °C) (Tympanic)   08/17/17 97.2 °F (36.2 °C) (Tympanic)   06/21/17 98.5 °F (36.9 °C) (Tympanic)     BP Readings from Last 3 Encounters:   03/27/18 124/72   08/24/17 130/70   08/17/17 (!) 150/80     Pulse Readings from Last 3 Encounters:   03/27/18 62   08/24/17 63   08/17/17 73     Body mass index is 28.94 kg/m².    Review of Systems   HENT: Negative for hearing loss.    Eyes: Negative for  discharge.   Respiratory: Negative for wheezing.    Cardiovascular: Negative for chest pain and palpitations.   Gastrointestinal: Negative for blood in stool, constipation, diarrhea and vomiting.   Genitourinary: Negative for hematuria and urgency.   Musculoskeletal: Negative for neck pain.   Neurological: Negative for weakness and headaches.   Endo/Heme/Allergies: Negative for polydipsia.         Physical Exam   Constitutional: He is oriented to person, place, and time. He appears well-developed and well-nourished. No distress.   HENT:   Head: Normocephalic and atraumatic.   Eyes: Conjunctivae and EOM are normal. Pupils are equal, round, and reactive to light.   Cardiovascular: Normal rate and regular rhythm.  Exam reveals no gallop and no friction rub.    No murmur heard.  Pulmonary/Chest: Effort normal and breath sounds normal.   Neurological: He is alert and oriented to person, place, and time.   Skin: Skin is warm and dry.   Psychiatric: He has a normal mood and affect.   Vitals reviewed.      Laboratory Reviewed ({Yes)  Lab Results   Component Value Date    WBC 12.73 (H) 11/08/2016    HGB 16.0 11/08/2016    HCT 49.0 11/08/2016     11/08/2016    CHOL 168 05/17/2017    TRIG 95 05/17/2017    HDL 35 (L) 05/17/2017    ALT 24 05/17/2017    AST 19 05/17/2017     03/26/2018    K 4.1 03/26/2018     03/26/2018    CREATININE 1.1 03/26/2018    BUN 23 03/26/2018    CO2 25 03/26/2018    TSH 1.796 11/08/2016    PSA 0.50 10/16/2013    HGBA1C 5.9 10/19/2015       Assessment:  The primary encounter diagnosis was Routine general medical examination at a health care facility. Diagnoses of Hypertension, essential, benign, Hyperlipidemia with target low density lipoprotein (LDL) cholesterol less than 160 mg/dL, Tobacco use, Encounter for abdominal aortic aneurysm (AAA) screening, and Need for pneumococcal vaccination were also pertinent to this visit.     Plan  Hypertension, essential, benign  Controlled,  continue current medications    Hyperlipidemia with target low density lipoprotein (LDL) cholesterol less than 160 mg/dL  Controlled, continue current medications    Tobacco use  Counseling given      Labs reviewed, questions answered.  Discussed skin care and need for sunscreen daily.  AAA ordered.  PNA13 given today.  Declines flu and shingles vaccines.  Care Plan/Goals: Reviewed  (Yes)  Goals      Blood Pressure < 130/80 (pt-stated)      Blood Pressure < 140/90      Exercise at least 150 minutes per week.      LDL CHOLESTEROL < 130      Maintain a low sodium diet            Keep up the great work not eating the jambalaya and chips!       Quit smoking / using tobacco      Take at least one BP reading per week at various times of the day      Weight < 77.6 kg (171 lb)            Congratulations on losing 8 lbs sir!            Follow up: Follow-up in about 6 months (around 9/27/2018), or if symptoms worsen or fail to improve, for follow up with Dr. Chávez.    After visit summary was printed and given to patient upon discharge today.  Patient goals and care plan are included in After Visit Summary.  Answers for HPI/ROS submitted by the patient on 3/26/2018   activity change: No  unexpected weight change: No  rhinorrhea: No  trouble swallowing: No  visual disturbance: No  chest tightness: No  polyuria: No  difficulty urinating: No  joint swelling: No  arthralgias: No  confusion: No  dysphoric mood: No

## 2018-04-02 ENCOUNTER — PATIENT OUTREACH (OUTPATIENT)
Dept: OTHER | Facility: OTHER | Age: 68
End: 2018-04-02

## 2018-04-02 NOTE — PROGRESS NOTES
Last 5 Patient Entered Readings                                      Current 30 Day Average: 131/73     Recent Readings 3/29/2018 3/26/2018 3/26/2018 3/22/2018 3/21/2018    SBP (mmHg) 125 137 144 124 141    DBP (mmHg) 70 77 77 69 73    Pulse 62 65 63 68 57        Mr. Victoria' BP is improving on higher dose of HCTZ. He has also started to monitor salt intake more closely. He notices the increase in his BP when he eats foods high in sodium. Advised he reduce the intake of these or avoid them completely. Encouraged him to continue to work toward lower BP readings.    Will continue to monitor regularly. Will follow up in 4-6 weeks, sooner if BP begins to trend upward or downward.    Patient has my contact information and knows to call with any concerns or clinical changes.     Current HTN regimen:  Hypertension Medications             amlodipine-benazepril (LOTREL) 10-40 mg per capsule TAKE 1 CAPSULE BY MOUTH ONCE DAILY.    hydroCHLOROthiazide (HYDRODIURIL) 25 MG tablet Take 1 tablet (25 mg total) by mouth once daily.    metoprolol succinate (TOPROL-XL) 200 MG 24 hr tablet TAKE 1 TABLET (200 MG TOTAL) BY MOUTH EVERY EVENING.

## 2018-04-04 ENCOUNTER — TELEPHONE (OUTPATIENT)
Dept: RADIOLOGY | Facility: HOSPITAL | Age: 68
End: 2018-04-04

## 2018-04-05 ENCOUNTER — HOSPITAL ENCOUNTER (OUTPATIENT)
Dept: RADIOLOGY | Facility: HOSPITAL | Age: 68
Discharge: HOME | End: 2018-04-05
Attending: NURSE PRACTITIONER
Payer: MEDICARE

## 2018-04-05 DIAGNOSIS — Z13.6 ENCOUNTER FOR ABDOMINAL AORTIC ANEURYSM (AAA) SCREENING: ICD-10-CM

## 2018-04-05 PROCEDURE — 76775 US EXAM ABDO BACK WALL LIM: CPT | Mod: TC

## 2018-04-05 PROCEDURE — 76775 US EXAM ABDO BACK WALL LIM: CPT | Mod: 26,,, | Performed by: RADIOLOGY

## 2018-04-13 ENCOUNTER — PATIENT OUTREACH (OUTPATIENT)
Dept: OTHER | Facility: OTHER | Age: 68
End: 2018-04-13
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
## 2018-05-04 ENCOUNTER — PATIENT OUTREACH (OUTPATIENT)
Dept: OTHER | Facility: OTHER | Age: 68
End: 2018-05-04

## 2018-05-04 NOTE — PROGRESS NOTES
"Last 5 Patient Entered Readings                                      Current 30 Day Average: 128/74     Recent Readings 5/14/2018 5/5/2018 5/1/2018 4/25/2018 4/20/2018    SBP (mmHg) 135 130 128 129 119    DBP (mmHg) 76 73 71 78 70    Pulse 57 57 60 63 60           5/15-Patient LVM stating he will try to return call on morning of 5/16.    Digital Medicine: Health  Follow Up    Left voicemail to follow up with Mr. Neymar Victoria.  Current BP average 128/72 mmHg is at goal, [130/80].    Patient reports still feeling like "i'm dragging" after taking his metoprolol. He states having episodes either in morning or afternoon, but, it happens 1x every 2-3 weeks. Encouraged patient to write the episodes down with what he ate and time of day to help pin point issues. Clinical pharmacist is aware of issue.     Digital Medicine: Health  Follow Up    Lifestyle Modifications:    1.Dietary Modifications (Sodium intake <2,000mg/day, food labels, dining out): Patient reports he had something to salty a couple weeks ago and noticed the spike in BP readings when eating salty foods. Patient states consuming Chinese 1x and crawfish 1x. Patient states he is watching his diet. Patient reports eating oatmeal for breakfast. Patient reports consuming a banana often. Patient reports consuming tuna often. Patient reports staying away from pizza and other fast foods.     2.Physical Activity: Patient reports he has not been riding his bike. Patient states he has been working around his campground. He states cleaning, putting out corn, and various projects. Encouraged patient     3.Medication Therapy: Patient has been compliant with the medication regimen. .     4.Patient has the following medication side effects/concerns: Patient reports still feeling like "i'm dragging" after taking his metoprolol. He states having episodes either in morning or afternoon, but, it happens 1x every 2-3 weeks. Encouraged patient to write the episodes down " with what he ate and time of day to help pin point issues. Clinical pharmacist is aware of issue  (Frequency/Alleviating factors/Precipitating factors, etc.)     Follow up with Mr. Neymar Victoria completed. No further questions or concerns. Will continue follow up to achieve health goals.

## 2018-05-20 DIAGNOSIS — I10 HYPERTENSION, ESSENTIAL, BENIGN: Chronic | ICD-10-CM

## 2018-05-21 RX ORDER — AMLODIPINE AND BENAZEPRIL HYDROCHLORIDE 10; 40 MG/1; MG/1
1 CAPSULE ORAL DAILY
Qty: 90 CAPSULE | Refills: 1 | Status: SHIPPED | OUTPATIENT
Start: 2018-05-21 | End: 2018-11-12 | Stop reason: SDUPTHER

## 2018-06-06 ENCOUNTER — PATIENT OUTREACH (OUTPATIENT)
Dept: OTHER | Facility: OTHER | Age: 68
End: 2018-06-06

## 2018-06-06 NOTE — PROGRESS NOTES
Last 5 Patient Entered Readings                                      Current 30 Day Average: 133/73     Recent Readings 6/5/2018 6/5/2018 6/5/2018 6/3/2018 5/28/2018    SBP (mmHg) 129 137 131 139 123    DBP (mmHg) 71 77 75 75 70    Pulse 60 59 59 60 58        Digital Medicine: Health  Follow Up    Left voicemail to follow up with Mr. Neymar Victoria.  Current BP average 133/73 mmHg is not at goal, [130/80].

## 2018-06-13 ENCOUNTER — PATIENT OUTREACH (OUTPATIENT)
Dept: OTHER | Facility: OTHER | Age: 68
End: 2018-06-13

## 2018-06-13 NOTE — PROGRESS NOTES
"Last 5 Patient Entered Readings                                      Current 30 Day Average: 132/73     Recent Readings 6/12/2018 6/7/2018 6/5/2018 6/5/2018 6/5/2018    SBP (mmHg) 123 129 129 137 131    DBP (mmHg) 70 71 71 77 75    Pulse 60 62 60 59 59        Mr. Victoria reports that he "threw his back out." He is starting to feel better. He is concerned that BP readings on digital monitor are inaccurate because he has 2 other BP monitors at home that are 10-15 mmHg lower that the digital monitor. He will bring this monitor to the [facility] to ensure that the cuff has no leaks and that it is working properly. BP average is trending down.     Will continue to monitor regularly. Will follow up in 4-6 weeks, sooner if BP begins to trend upward or downward.    Patient has my contact information and knows to call with any concerns or clinical changes.     Current HTN regimen:  Hypertension Medications             amlodipine-benazepril (LOTREL) 10-40 mg per capsule TAKE 1 CAPSULE BY MOUTH ONCE DAILY.    hydroCHLOROthiazide (HYDRODIURIL) 25 MG tablet Take 1 tablet (25 mg total) by mouth once daily.    metoprolol succinate (TOPROL-XL) 200 MG 24 hr tablet TAKE 1 TABLET (200 MG TOTAL) BY MOUTH EVERY EVENING.              "

## 2018-06-25 NOTE — PROGRESS NOTES
Last 5 Patient Entered Readings                                      Current 30 Day Average: 129/71     Recent Readings 6/20/2018 6/12/2018 6/7/2018 6/5/2018 6/5/2018    SBP (mmHg) 131 123 129 129 137    DBP (mmHg) 70 70 71 71 77    Pulse 61 60 62 60 59            Digital Medicine: Health  Follow Up    Left voicemail to follow up with Mr. Neymar Victoria.  Current BP average 129/71 mmHg is at goal, [130/80].

## 2018-07-09 NOTE — PROGRESS NOTES
Last 5 Patient Entered Readings                                      Current 30 Day Average: 123/67     Recent Readings 7/8/2018 7/3/2018 6/30/2018 6/27/2018 6/20/2018    SBP (mmHg) 123 123 109 128 131    DBP (mmHg) 62 68 65 68 70    Pulse 65 60 62 61 61            Digital Medicine: Health  Follow Up    Lifestyle Modifications:    1.Dietary Modifications (Sodium intake <2,000mg/day, food labels, dining out): Patient reports reading food labels and trying hard to limit sodium intake by purchasing low-sodium foods. Encouraged patient to continue reading labels and decreasing sodium filled foods.     2.Physical Activity: Patient states he riding stationary bike prior to having back pain. Patient reports having limited mobility due to having said back pain. He states feeling better and will return to using the bike soon. Encouraged patient to take time and exercise as much as he feels comfortable. He states planning on increasing biking.      3.Medication Therapy: Patient has been compliant with the medication regimen.    4.Patient has the following medication side effects/concerns:   (Frequency/Alleviating factors/Precipitating factors, etc.)     Follow up with Mr. Neymar Victoria completed. No further questions or concerns. Will continue follow up to achieve health goals.

## 2018-07-19 ENCOUNTER — PATIENT OUTREACH (OUTPATIENT)
Dept: OTHER | Facility: OTHER | Age: 68
End: 2018-07-19

## 2018-07-19 NOTE — PROGRESS NOTES
Last 5 Patient Entered Readings                                      Current 30 Day Average: 123/66     Recent Readings 7/18/2018 7/17/2018 7/16/2018 7/8/2018 7/3/2018    SBP (mmHg) 123 123 124 123 123    DBP (mmHg) 67 63 63 62 68    Pulse 65 63 74 65 60        Mr. Victoria' BP readings are controlled and stable. He realized he was consuming more salt than he thought because he was eating turkey sandwiches and hot dogs on an almost daily basis. He also started charging his BP monitor and that has allowed BP readings to be more similar to his other BP monitor. He has no questions or concerns at that time.     Patient's BP average is controlled based on 2017 ACC/AHA HTN guidelines of goal BP <130/80.      Patient denies s/s of hypotension (lightheadedness, dizziness, nausea, fatigue) associated with low readings. Instructed patient to inform me if this occurs, patient confirms understanding.      Patient's health , Jose Antonio Rob, will be following up every 3-4 weeks. I will continue to monitor regularly and will follow up in 3-4 months, sooner if BP begins to trend upward or downward.    Patient has my contact information and knows to call with any concerns or clinical changes.     Current HTN regimen:  Hypertension Medications             amlodipine-benazepril (LOTREL) 10-40 mg per capsule TAKE 1 CAPSULE BY MOUTH ONCE DAILY.    hydroCHLOROthiazide (HYDRODIURIL) 25 MG tablet Take 1 tablet (25 mg total) by mouth once daily.    metoprolol succinate (TOPROL-XL) 200 MG 24 hr tablet TAKE 1 TABLET (200 MG TOTAL) BY MOUTH EVERY EVENING.

## 2018-08-06 ENCOUNTER — TELEPHONE (OUTPATIENT)
Dept: DERMATOLOGY | Facility: CLINIC | Age: 68
End: 2018-08-06

## 2018-08-06 ENCOUNTER — PATIENT OUTREACH (OUTPATIENT)
Dept: OTHER | Facility: OTHER | Age: 68
End: 2018-08-06

## 2018-08-06 NOTE — PROGRESS NOTES
Last 5 Patient Entered Readings                                      Current 30 Day Average: 124/67     Recent Readings 8/6/2018 7/31/2018 7/30/2018 7/28/2018 7/24/2018    SBP (mmHg) 127 115 121 139 124    DBP (mmHg) 73 65 69 72 68    Pulse 85 55 59 61 59          Digital Medicine: Health  Follow Up    Left voicemail to follow up with Mr. Neymar Victoria.  Current BP average 124/67 mmHg is at goal, [130/80]. BP is well-controlled.

## 2018-08-06 NOTE — TELEPHONE ENCOUNTER
----- Message from Sandrita Barrera sent at 8/6/2018  9:22 AM CDT -----  Contact: pt  He's calling in regards to being worked into schedule pls call pt back at [phone] (home)

## 2018-08-08 ENCOUNTER — INITIAL CONSULT (OUTPATIENT)
Dept: DERMATOLOGY | Facility: CLINIC | Age: 68
End: 2018-08-08
Payer: MEDICARE

## 2018-08-08 DIAGNOSIS — D48.5 NEOPLASM OF UNCERTAIN BEHAVIOR OF SKIN: ICD-10-CM

## 2018-08-08 DIAGNOSIS — L91.8 ACROCHORDON: ICD-10-CM

## 2018-08-08 DIAGNOSIS — L82.1 SEBORRHEIC KERATOSIS: Primary | ICD-10-CM

## 2018-08-08 PROCEDURE — 99212 OFFICE O/P EST SF 10 MIN: CPT | Mod: PBBFAC,PO | Performed by: DERMATOLOGY

## 2018-08-08 PROCEDURE — 99999 PR PBB SHADOW E&M-EST. PATIENT-LVL II: CPT | Mod: PBBFAC,,, | Performed by: DERMATOLOGY

## 2018-08-08 PROCEDURE — 99213 OFFICE O/P EST LOW 20 MIN: CPT | Mod: S$PBB,,, | Performed by: DERMATOLOGY

## 2018-08-08 NOTE — Clinical Note
Hi,  You are seeing this patient on 8/20. He has a cystic lesion of the left lower eyelid, it's too close to the margin for me to biopsy.  Would you be able to biopsy this to r/o basal cell?  There is a photo in my note and under media.  Thanks,  Shanice

## 2018-08-08 NOTE — PATIENT INSTRUCTIONS
Preventing Skin Cancer  Relaxing in the sun may feel good. But it isnt good for your skin. In fact, being exposed to the suns harmful rays is a major cause of skin cancer. This is a serious disease that can be life-threatening. People of all ages and backgrounds are at risk. But in most cases, skin cancer can be prevented.    Your Role in Prevention  You can act today to help prevent skin cancer. Start by avoiding the suns UV (ultraviolet) rays. And dont use tanning beds, which are no safer than the sun. Taking these steps can help keep you from getting skin cancer. It can also help prevent wrinkles and other sun-induced aging effects. Make sure your children also follow these safeguards. Now is the time to start taking preventive steps against skin cancer.  When You Are Outdoors  Protect your skin when you go outdoors during the day. Take precautions whenever you go out to eat, run errands by car or on foot, or do any outdoor activity. There isnt just one easy way to protect your skin. Its best to follow all of these steps:  · Wear tightly woven clothing that covers your skin. Put on a wide-brimmed hat to protect your face, ears, and scalp.  · Watch the clock. Try to avoid the sun between 10 a.m. and 4 p.m., when it is strongest.  · Head for the shade or create your own. Use an umbrella when sitting or strolling.  · Know that the suns rays can reflect off sand, water, and snow. This can harm your skin. Take extra care when you are near reflective surfaces.  · Keep in mind that even when the weather is hazy or cloudy, your skin can be exposed to strong UV rays.  · Shield your skin with sunscreen. Also, apply sunscreen to your childrens skin.  Tips for Using Sunscreen  To help prevent skin cancer, choose the right sunscreen and use it correctly. Try the following tips:  · Choose a sunscreen that has a sun protection factor (SPF) of at least 15. For the best protection, an SPF of at least 30 is preferred.  Also, choose a sunscreen labeled broad spectrum. This will shield you from both UVA and UVB (ultraviolet A and B) rays.  · If one brand irritates your skin, try another, particularly ones without fragrance.  · Use a water-resistant sunscreen if swimming or sweating.  · Reapply sunscreen every 2 hours. If youre active, do this more often.  · Cover any sun-exposed skin, from your face to your feet. Dont forget your ears and your lips.  · Know that while sunscreen helps protect you, it isnt enough. You should also wear protective clothing. And try to stay out of the sun as much as you can, especially from 10 a.m. to 4 p.m.  © 8934-8622 [employer]. [address] [place] [address], [place] [address]. All rights reserved. This information is not intended as a substitute for professional medical care. Always follow your healthcare professional's instructions.

## 2018-08-08 NOTE — PROGRESS NOTES
Subjective:       Patient ID:  Neymar Victoria is a 68 y.o. male who presents for   Chief Complaint   Patient presents with    Cyst     left lower lid     History of Present Illness: The patient presents with chief complaint of bump/cyst.  Location: left lower lid  Duration: 2 weeks  Signs/Symptoms: denies symptoms    Prior treatments: none    The patient denies personal history of skin cancer. Father c/ hx of melanoma.          Review of Systems   Constitutional: Negative for fever and chills.   Skin: Positive for activity-related sunscreen use. Negative for daily sunscreen use, recent sunburn and wears hat.        Objective:    Physical Exam   Constitutional: He appears well-developed and well-nourished. No distress.   Neurological: He is alert and oriented to person, place, and time. He is not disoriented.   Psychiatric: He has a normal mood and affect.   Skin:   Areas Examined (abnormalities noted in diagram):   Scalp / Hair Palpated and Inspected  Head / Face Inspection Performed  Neck Inspection Performed  Chest / Axilla Inspection Performed  Abdomen Inspection Performed  Back Inspection Performed  RUE Inspected  LUE Inspection Performed  RLE Inspected  LLE Inspection Performed  Nails and Digits Inspection Performed                       Diagram Legend     Erythematous scaling macule/papule c/w actinic keratosis       Vascular papule c/w angioma      Pigmented verrucoid papule/plaque c/w seborrheic keratosis      Yellow umbilicated papule c/w sebaceous hyperplasia      Irregularly shaped tan macule c/w lentigo     1-2 mm smooth white papules consistent with Milia      Movable subcutaneous cyst with punctum c/w epidermal inclusion cyst      Subcutaneous movable cyst c/w pilar cyst      Firm pink to brown papule c/w dermatofibroma      Pedunculated fleshy papule(s) c/w skin tag(s)      Evenly pigmented macule c/w junctional nevus     Mildly variegated pigmented, slightly irregular-bordered macule c/w mildly  atypical nevus      Flesh colored to evenly pigmented papule c/w intradermal nevus       Pink pearly papule/plaque c/w basal cell carcinoma      Erythematous hyperkeratotic cursted plaque c/w SCC      Surgical scar with no sign of skin cancer recurrence      Open and closed comedones      Inflammatory papules and pustules      Verrucoid papule consistent consistent with wart     Erythematous eczematous patches and plaques     Dystrophic onycholytic nail with subungual debris c/w onychomycosis     Umbilicated papule    Erythematous-base heme-crusted tan verrucoid plaque consistent with inflamed seborrheic keratosis     Erythematous Silvery Scaling Plaque c/w Psoriasis     See annotation            Assessment / Plan:        Seborrheic keratosis  Reassurance given. Discussed diagnosis and that lesions are benign.  AAD handout given.     Acrochordon  Reassurance given.  Lesions are benign.    Neoplasm of uncertain behavior of skin  Of left lower eyelid. Recommend biopsy, given proximity to eyelid margin, will refer to ophtho. Photodocumentation done today.  Will cc to Dr. Estevez and discuss that pt will need lesion biopsied to r/o cystic BCC vs. Other benign cyst. The patient acknowledged understanding.              Follow-up in about 6 months (around 2/8/2019).

## 2018-08-17 DIAGNOSIS — I10 HYPERTENSION, ESSENTIAL, BENIGN: Chronic | ICD-10-CM

## 2018-08-17 RX ORDER — METOPROLOL SUCCINATE 200 MG/1
200 TABLET, EXTENDED RELEASE ORAL NIGHTLY
Qty: 90 TABLET | Refills: 1 | Status: SHIPPED | OUTPATIENT
Start: 2018-08-17 | End: 2019-02-09 | Stop reason: SDUPTHER

## 2018-08-20 ENCOUNTER — OFFICE VISIT (OUTPATIENT)
Dept: OPHTHALMOLOGY | Facility: CLINIC | Age: 68
End: 2018-08-20
Payer: MEDICARE

## 2018-08-20 DIAGNOSIS — H52.7 REFRACTIVE ERROR: ICD-10-CM

## 2018-08-20 DIAGNOSIS — H25.13 NUCLEAR SCLEROSIS, BILATERAL: Primary | ICD-10-CM

## 2018-08-20 DIAGNOSIS — H04.123 DRY EYE SYNDROME, BILATERAL: ICD-10-CM

## 2018-08-20 DIAGNOSIS — H02.825 CYST OF LEFT LOWER EYELID: ICD-10-CM

## 2018-08-20 PROCEDURE — 92015 DETERMINE REFRACTIVE STATE: CPT | Mod: ,,, | Performed by: OPHTHALMOLOGY

## 2018-08-20 PROCEDURE — 92014 COMPRE OPH EXAM EST PT 1/>: CPT | Mod: S$PBB,,, | Performed by: OPHTHALMOLOGY

## 2018-08-20 PROCEDURE — 99999 PR PBB SHADOW E&M-EST. PATIENT-LVL II: CPT | Mod: PBBFAC,,, | Performed by: OPHTHALMOLOGY

## 2018-08-20 PROCEDURE — 99212 OFFICE O/P EST SF 10 MIN: CPT | Mod: PBBFAC | Performed by: OPHTHALMOLOGY

## 2018-08-20 NOTE — PROGRESS NOTES
HPI     Patient c/o difficulty driving at night patient states he wears his   sunglasses more during the day when driving even when there is no glare,   patient c/o bump on LLL saw Dr. Valderrama 2 weeks ago patient states it does   not bother him.    Last edited by ANEUDY Rodriguez on 8/20/2018  8:44 AM. (History)            Assessment /Plan     For exam results, see Encounter Report.      ICD-10-CM ICD-9-CM    1. Nuclear sclerosis, bilateral H25.13 366.16 follow   2. Dry eye syndrome, bilateral H04.123 375.15 Mild and doing well   3. Cyst of left lower eyelid H02.825 374.84 will remove if patient desires, benign in appearance without meibomian gland destruction   4. Refractive error H52.7 367.9 Disp mr       Return to clinic 1 year

## 2018-08-27 NOTE — PROGRESS NOTES
Last 5 Patient Entered Readings                                      Current 30 Day Average: 124/68     Recent Readings 8/22/2018 8/20/2018 8/19/2018 8/11/2018 8/9/2018    SBP (mmHg) 123 124 133 120 113    DBP (mmHg) 68 69 64 66 69    Pulse 63 59 69 59 61        Patient also discussed his previous eye doctor and other doctor's appointments; current weather forecast; being retired for 11 years.      He states decreasing weight by 6 lbs.     Digital Medicine: Health  Follow Up    Lifestyle Modifications:    1.Dietary Modifications (Sodium intake <2,000mg/day, food labels, dining out): Patient reports eating low-sodium tuna with 35 mg/sodium per serving size. He states reading labels often. Encouraged patient to continue reading labels and consuming items below 140 mg/sodium.     2.Physical Activity: Patient reports his activity level has been limited but is riding his stationary bike. He states riding it every day at least every other day. Encouraged patient to continue his exercise regimen.     3.Medication Therapy: Patient has been compliant with the medication regimen.    4.Patient has the following medication side effects/concerns:   (Frequency/Alleviating factors/Precipitating factors, etc.)     Follow up with Mr. Neymar Victoria completed. No further questions or concerns. Will continue follow up to achieve health goals.

## 2018-09-03 DIAGNOSIS — I10 ESSENTIAL HYPERTENSION: ICD-10-CM

## 2018-09-04 RX ORDER — HYDROCHLOROTHIAZIDE 25 MG/1
TABLET ORAL
Qty: 90 TABLET | Refills: 1 | Status: SHIPPED | OUTPATIENT
Start: 2018-09-04 | End: 2019-02-13 | Stop reason: SDUPTHER

## 2018-09-21 ENCOUNTER — PATIENT OUTREACH (OUTPATIENT)
Dept: ADMINISTRATIVE | Facility: HOSPITAL | Age: 68
End: 2018-09-21

## 2018-09-24 ENCOUNTER — PATIENT OUTREACH (OUTPATIENT)
Dept: OTHER | Facility: OTHER | Age: 68
End: 2018-09-24

## 2018-09-24 NOTE — PROGRESS NOTES
Last 5 Patient Entered Readings                                      Current 30 Day Average: 124/71     Recent Readings 9/19/2018 9/11/2018 9/10/2018 9/7/2018 9/4/2018    SBP (mmHg) 129 124 130 125 123    DBP (mmHg) 71 71 69 74 71    Pulse 61 57 59 56 65        Encounter also consisted of patient discussing his previous [employer] game day experiences with his family.    He states he will start hunting mid-October thru January. He reports we should be able to reach him 90% of the time. Will maintain regular call schedule with patient.      Digital Medicine: Health  Follow Up    Lifestyle Modifications:    1.Dietary Modifications (Sodium intake <2,000mg/day, food labels, dining out): Patient reports maintaining the same diet since previous encounter. He states reading the food labels and is watching what he eats. He states limiting the gumbo and other cajun foods. He states he may crave fried bologna but reports staying disciplined; not consuming it.       2.Physical Activity: Patient reports he is maintaining his stationary bike regimen. He states riding it at least every other day for 15-20 minutes. He states cutting grass and weed eating. Patient reports feeling great about his activity level. Encouraged patient to stay mindful of heat and drinking plenty of water. He states he is trying.      3.Medication Therapy: Patient has been compliant with the medication regimen.    4.Patient has the following medication side effects/concerns:   (Frequency/Alleviating factors/Precipitating factors, etc.)     Follow up with Mr. Neymar Victoria completed. No further questions or concerns. Will continue to follow up to achieve health goals.

## 2018-09-27 ENCOUNTER — LAB VISIT (OUTPATIENT)
Dept: LAB | Facility: HOSPITAL | Age: 68
End: 2018-09-27
Attending: FAMILY MEDICINE
Payer: MEDICARE

## 2018-09-27 ENCOUNTER — OFFICE VISIT (OUTPATIENT)
Dept: INTERNAL MEDICINE | Facility: CLINIC | Age: 68
End: 2018-09-27
Payer: MEDICARE

## 2018-09-27 VITALS
OXYGEN SATURATION: 95 % | DIASTOLIC BLOOD PRESSURE: 72 MMHG | WEIGHT: 181 LBS | BODY MASS INDEX: 28.78 KG/M2 | HEART RATE: 65 BPM | SYSTOLIC BLOOD PRESSURE: 118 MMHG | TEMPERATURE: 97 F

## 2018-09-27 DIAGNOSIS — Z72.0 TOBACCO USE: Chronic | ICD-10-CM

## 2018-09-27 DIAGNOSIS — K63.5 POLYP OF COLON, UNSPECIFIED PART OF COLON, UNSPECIFIED TYPE: ICD-10-CM

## 2018-09-27 DIAGNOSIS — L72.0 CYST OF SKIN AND SUBCUTANEOUS TISSUE: ICD-10-CM

## 2018-09-27 DIAGNOSIS — Z11.59 NEED FOR HEPATITIS C SCREENING TEST: ICD-10-CM

## 2018-09-27 DIAGNOSIS — E78.5 HYPERLIPIDEMIA WITH TARGET LOW DENSITY LIPOPROTEIN (LDL) CHOLESTEROL LESS THAN 160 MG/DL: Chronic | ICD-10-CM

## 2018-09-27 DIAGNOSIS — I10 HYPERTENSION, ESSENTIAL, BENIGN: Primary | Chronic | ICD-10-CM

## 2018-09-27 DIAGNOSIS — E78.5 HYPERLIPIDEMIA, UNSPECIFIED HYPERLIPIDEMIA TYPE: ICD-10-CM

## 2018-09-27 LAB
ALBUMIN SERPL BCP-MCNC: 4.7 G/DL
ALP SERPL-CCNC: 72 U/L
ALT SERPL W/O P-5'-P-CCNC: 26 U/L
ANION GAP SERPL CALC-SCNC: 12 MMOL/L
AST SERPL-CCNC: 20 U/L
BILIRUB SERPL-MCNC: 1 MG/DL
BUN SERPL-MCNC: 15 MG/DL
CALCIUM SERPL-MCNC: 10.8 MG/DL
CHLORIDE SERPL-SCNC: 105 MMOL/L
CHOLEST SERPL-MCNC: 224 MG/DL
CHOLEST/HDLC SERPL: 5.9 {RATIO}
CO2 SERPL-SCNC: 28 MMOL/L
CREAT SERPL-MCNC: 1 MG/DL
EST. GFR  (AFRICAN AMERICAN): >60 ML/MIN/1.73 M^2
EST. GFR  (NON AFRICAN AMERICAN): >60 ML/MIN/1.73 M^2
GLUCOSE SERPL-MCNC: 97 MG/DL
HDLC SERPL-MCNC: 38 MG/DL
HDLC SERPL: 17 %
LDLC SERPL CALC-MCNC: 145 MG/DL
NONHDLC SERPL-MCNC: 186 MG/DL
POTASSIUM SERPL-SCNC: 4.6 MMOL/L
PROT SERPL-MCNC: 8 G/DL
SODIUM SERPL-SCNC: 145 MMOL/L
TRIGL SERPL-MCNC: 205 MG/DL

## 2018-09-27 PROCEDURE — 80053 COMPREHEN METABOLIC PANEL: CPT

## 2018-09-27 PROCEDURE — 99999 PR PBB SHADOW E&M-EST. PATIENT-LVL III: CPT | Mod: PBBFAC,,, | Performed by: FAMILY MEDICINE

## 2018-09-27 PROCEDURE — 36415 COLL VENOUS BLD VENIPUNCTURE: CPT

## 2018-09-27 PROCEDURE — 80061 LIPID PANEL: CPT

## 2018-09-27 PROCEDURE — 90662 IIV NO PRSV INCREASED AG IM: CPT | Mod: PBBFAC

## 2018-09-27 PROCEDURE — 99214 OFFICE O/P EST MOD 30 MIN: CPT | Mod: S$PBB,,, | Performed by: FAMILY MEDICINE

## 2018-09-27 PROCEDURE — 86803 HEPATITIS C AB TEST: CPT

## 2018-09-27 PROCEDURE — 99213 OFFICE O/P EST LOW 20 MIN: CPT | Mod: PBBFAC,25 | Performed by: FAMILY MEDICINE

## 2018-09-27 NOTE — ASSESSMENT & PLAN NOTE
Reassured patient, advised to notify me if increasing in size, tender or otherwise bothersome.  Patient expressed understanding.

## 2018-09-27 NOTE — PROGRESS NOTES
Subjective:       Patient ID: Neymar Victoria is a 68 y.o. male.    Chief Complaint: Follow-up    Patient presents to clinic today for followup of hypertension and hyperlipidemia.  He reports he has cut down on smoking.  He also reports noting a pea-sized, nontender mass in the suprapubic area about 2 months ago.  He denies any change in size.  Patient is otherwise without concerns today.      Review of Systems   Constitutional: Negative for activity change and unexpected weight change.   HENT: Negative for hearing loss, rhinorrhea and trouble swallowing.    Eyes: Negative for discharge and visual disturbance.   Respiratory: Negative for chest tightness and wheezing.    Cardiovascular: Negative for chest pain and palpitations.   Gastrointestinal: Negative for blood in stool, constipation, diarrhea and vomiting.   Endocrine: Negative for polydipsia and polyuria.   Genitourinary: Negative for difficulty urinating, hematuria and urgency.   Musculoskeletal: Negative for arthralgias, joint swelling and neck pain.   Neurological: Negative for weakness and headaches.   Psychiatric/Behavioral: Negative for confusion and dysphoric mood.       Objective:      Physical Exam   Constitutional: He is oriented to person, place, and time. He appears well-developed and well-nourished. No distress.   HENT:   Head: Normocephalic and atraumatic.   Eyes: Conjunctivae and EOM are normal. Pupils are equal, round, and reactive to light. No scleral icterus.   Cardiovascular: Normal rate and regular rhythm. Exam reveals no gallop and no friction rub.   No murmur heard.  Pulmonary/Chest: Effort normal and breath sounds normal.   Genitourinary:         Neurological: He is alert and oriented to person, place, and time. No cranial nerve deficit. Gait normal.   Psychiatric: He has a normal mood and affect.   Vitals reviewed.      Assessment:       1. Hypertension, essential, benign    2. Hyperlipidemia with target low density lipoprotein (LDL)  cholesterol less than 160 mg/dL    3. Tobacco use    4. Cyst of skin and subcutaneous tissue    5. Need for hepatitis C screening test    6. Polyp of colon, unspecified part of colon, unspecified type        Plan:     Problem List Items Addressed This Visit     Hypertension, essential, benign - Primary (Chronic)    Current Assessment & Plan     Controlled, continue hydrochlorothiazide, metoprolol and amlodipine-benazepril         Hyperlipidemia with target low density lipoprotein (LDL) cholesterol less than 160 mg/dL (Chronic)    Current Assessment & Plan     Status pending labs         Tobacco use (Chronic)    Current Assessment & Plan     Encouraged cessation, congratulated patient for cutting back         Colon polyps    Current Assessment & Plan     Colonoscopy up-to-date, due in February 2019         Cyst of skin and subcutaneous tissue    Current Assessment & Plan     Reassured patient, advised to notify me if increasing in size, tender or otherwise bothersome.  Patient expressed understanding.           Other Visit Diagnoses     Need for hepatitis C screening test        Relevant Orders    Hepatitis C antibody          Health Maintenance reviewed/updated. Flu vaccine today.

## 2018-09-28 LAB — HCV AB SERPL QL IA: NEGATIVE

## 2018-10-07 DIAGNOSIS — E83.52 HYPERCALCEMIA: Primary | ICD-10-CM

## 2018-10-22 ENCOUNTER — PATIENT OUTREACH (OUTPATIENT)
Dept: OTHER | Facility: OTHER | Age: 68
End: 2018-10-22

## 2018-10-22 NOTE — PROGRESS NOTES
Last 5 Patient Entered Readings                                      Current 30 Day Average: 121/70     Recent Readings 10/16/2018 10/9/2018 9/30/2018 9/26/2018 9/25/2018    SBP (mmHg) 114 115 122 130 125    DBP (mmHg) 67 69 72 73 68    Pulse 59 58 60 59 57            Digital Medicine: Health  Follow Up    Left voicemail to follow up with Mr. Neymar Victoria.  Current BP average 121/70 mmHg is at goal, [130/80].

## 2018-11-12 ENCOUNTER — LAB VISIT (OUTPATIENT)
Dept: LAB | Facility: HOSPITAL | Age: 68
End: 2018-11-12
Payer: MEDICARE

## 2018-11-12 DIAGNOSIS — I10 HYPERTENSION, ESSENTIAL, BENIGN: Chronic | ICD-10-CM

## 2018-11-12 DIAGNOSIS — E83.52 HYPERCALCEMIA: ICD-10-CM

## 2018-11-12 LAB — CALCIUM SERPL-MCNC: 10.6 MG/DL

## 2018-11-12 PROCEDURE — 36415 COLL VENOUS BLD VENIPUNCTURE: CPT

## 2018-11-12 PROCEDURE — 82310 ASSAY OF CALCIUM: CPT

## 2018-11-12 RX ORDER — AMLODIPINE AND BENAZEPRIL HYDROCHLORIDE 10; 40 MG/1; MG/1
CAPSULE ORAL
Qty: 90 CAPSULE | Refills: 1 | Status: SHIPPED | OUTPATIENT
Start: 2018-11-12 | End: 2019-03-26 | Stop reason: ALTCHOICE

## 2018-12-13 ENCOUNTER — PATIENT OUTREACH (OUTPATIENT)
Dept: OTHER | Facility: OTHER | Age: 68
End: 2018-12-13

## 2018-12-13 NOTE — PROGRESS NOTES
Last 5 Patient Entered Readings                                      Current 30 Day Average: 124/68     Recent Readings 12/12/2018 12/4/2018 12/2/2018 11/25/2018 11/12/2018    SBP (mmHg) 124 124 124 122 121    DBP (mmHg) 66 67 67 72 69    Pulse 78 64 72 63 62        Patient's BP average is controlled based on 2017 ACC/AHA HTN guidelines of goal BP <130/80.      Mr. Victoria is doing well. He has no questions or concerns at this time. He thanks me for calling to check in.     Patient's health  will be following up every 4-6 weeks. I will continue to monitor regularly and will follow up in 4-6 months, sooner if BP begins to trend upward or downward.    Patient has my contact information and knows to call with any concerns or clinical changes.     Current HTN regimen:  Hypertension Medications             amlodipine-benazepril (LOTREL) 10-40 mg per capsule TAKE 1 CAPSULE BY MOUTH EVERY DAY    hydroCHLOROthiazide (HYDRODIURIL) 25 MG tablet TAKE 1 TABLET BY MOUTH EVERY DAY    metoprolol succinate (TOPROL-XL) 200 MG 24 hr tablet TAKE 1 TABLET (200 MG TOTAL) BY MOUTH EVERY EVENING.

## 2018-12-18 ENCOUNTER — PATIENT MESSAGE (OUTPATIENT)
Dept: ADMINISTRATIVE | Facility: OTHER | Age: 68
End: 2018-12-18

## 2019-01-22 ENCOUNTER — TELEPHONE (OUTPATIENT)
Dept: INTERNAL MEDICINE | Facility: CLINIC | Age: 69
End: 2019-01-22

## 2019-01-22 ENCOUNTER — OFFICE VISIT (OUTPATIENT)
Dept: INTERNAL MEDICINE | Facility: CLINIC | Age: 69
End: 2019-01-22
Payer: MEDICARE

## 2019-01-22 VITALS
HEART RATE: 69 BPM | SYSTOLIC BLOOD PRESSURE: 146 MMHG | TEMPERATURE: 98 F | WEIGHT: 188.5 LBS | BODY MASS INDEX: 30.29 KG/M2 | HEIGHT: 66 IN | OXYGEN SATURATION: 96 % | DIASTOLIC BLOOD PRESSURE: 78 MMHG

## 2019-01-22 DIAGNOSIS — L72.0 CYST OF SKIN AND SUBCUTANEOUS TISSUE: Primary | ICD-10-CM

## 2019-01-22 PROCEDURE — 99999 PR PBB SHADOW E&M-EST. PATIENT-LVL IV: ICD-10-PCS | Mod: PBBFAC,,, | Performed by: NURSE PRACTITIONER

## 2019-01-22 PROCEDURE — 87075 CULTR BACTERIA EXCEPT BLOOD: CPT

## 2019-01-22 PROCEDURE — 99999 PR PBB SHADOW E&M-EST. PATIENT-LVL IV: CPT | Mod: PBBFAC,,, | Performed by: NURSE PRACTITIONER

## 2019-01-22 PROCEDURE — 99213 PR OFFICE/OUTPT VISIT, EST, LEVL III, 20-29 MIN: ICD-10-PCS | Mod: S$PBB,,, | Performed by: NURSE PRACTITIONER

## 2019-01-22 PROCEDURE — 87077 CULTURE AEROBIC IDENTIFY: CPT

## 2019-01-22 PROCEDURE — 99214 OFFICE O/P EST MOD 30 MIN: CPT | Mod: PBBFAC | Performed by: NURSE PRACTITIONER

## 2019-01-22 PROCEDURE — 87186 SC STD MICRODIL/AGAR DIL: CPT

## 2019-01-22 PROCEDURE — 87070 CULTURE OTHR SPECIMN AEROBIC: CPT

## 2019-01-22 PROCEDURE — 99213 OFFICE O/P EST LOW 20 MIN: CPT | Mod: S$PBB,,, | Performed by: NURSE PRACTITIONER

## 2019-01-22 PROCEDURE — 87076 CULTURE ANAEROBE IDENT EACH: CPT | Mod: 59

## 2019-01-22 NOTE — TELEPHONE ENCOUNTER
----- Message from Verito Montiel sent at 1/22/2019 12:10 PM CST -----  Contact: Patient  Patient states that he was suppose to have an antibiotic called in to [facility] on [address], but they do not have anything, please call him back at [phone]. Thank you

## 2019-01-22 NOTE — TELEPHONE ENCOUNTER
Kristi Boyd notified and states she will wait until the results of the culture have been received to see if an antibiotic is needed. Patient notified of Kristi Boyd's response and advised to continue with the sitz bath and to call the office if redness/swelling occurs, patient verbalized understanding.

## 2019-01-22 NOTE — PROGRESS NOTES
Subjective:       Patient ID: Neymar Victoria is a 68 y.o. male.    Chief Complaint: cyst to groin area (left)    Patient presents for left groin cyst. Reports this weekend now tender, swollen and bigger      Cyst   This is a chronic problem. The current episode started more than 1 year ago. The problem occurs constantly. The problem has been gradually worsening. Pertinent negatives include no arthralgias, chest pain, headaches, joint swelling, neck pain, vomiting or weakness. Nothing aggravates the symptoms. He has tried nothing for the symptoms. The treatment provided no relief.     Review of Systems   Constitutional: Negative for activity change and unexpected weight change.   HENT: Negative for hearing loss, rhinorrhea and trouble swallowing.    Eyes: Negative for discharge and visual disturbance.   Respiratory: Negative for chest tightness and wheezing.    Cardiovascular: Negative for chest pain and palpitations.   Gastrointestinal: Negative for blood in stool, constipation, diarrhea and vomiting.   Endocrine: Negative for polydipsia and polyuria.   Genitourinary: Negative for difficulty urinating, hematuria and urgency.   Musculoskeletal: Negative for arthralgias, joint swelling and neck pain.   Skin:        cyst   Neurological: Negative for weakness and headaches.   Psychiatric/Behavioral: Negative for confusion and dysphoric mood.       Objective:      Physical Exam   Constitutional: He appears well-developed and well-nourished.   Genitourinary:         Neurological: He is alert.   Skin: He is not diaphoretic.   Psychiatric: He has a normal mood and affect.   Vitals reviewed.      Assessment:       1. Cyst of skin and subcutaneous tissue        Plan:   Cyst of skin and subcutaneous tissue  Comments:  sitz baths and follow up with derm  Orders:  -     CULTURE, AEROBIC  (SPECIFY SOURCE)  -     Culture, Anaerobic      Does not appear to be infected at this time. Encouraged sitz baths and warm compresses. Follow up  with derm if needs to be opened.   Follow-up in about 1 week (around 1/29/2019), or if symptoms worsen or fail to improve, for derm for cyst.

## 2019-01-24 DIAGNOSIS — L08.9 INFECTED CYST OF SKIN: Primary | ICD-10-CM

## 2019-01-24 DIAGNOSIS — L72.9 INFECTED CYST OF SKIN: Primary | ICD-10-CM

## 2019-01-24 LAB — BACTERIA SPEC AEROBE CULT: NORMAL

## 2019-01-24 RX ORDER — SULFAMETHOXAZOLE AND TRIMETHOPRIM 800; 160 MG/1; MG/1
1 TABLET ORAL 2 TIMES DAILY
Qty: 14 TABLET | Refills: 0 | Status: SHIPPED | OUTPATIENT
Start: 2019-01-24 | End: 2019-01-31

## 2019-01-28 ENCOUNTER — OFFICE VISIT (OUTPATIENT)
Dept: DERMATOLOGY | Facility: CLINIC | Age: 69
End: 2019-01-28
Payer: MEDICARE

## 2019-01-28 DIAGNOSIS — A49.8 PROTEUS INFECTION: ICD-10-CM

## 2019-01-28 DIAGNOSIS — L72.3 INFLAMED SEBACEOUS CYST: Primary | ICD-10-CM

## 2019-01-28 LAB — BACTERIA SPEC ANAEROBE CULT: NORMAL

## 2019-01-28 PROCEDURE — 99999 PR PBB SHADOW E&M-EST. PATIENT-LVL III: CPT | Mod: PBBFAC,,, | Performed by: PHYSICIAN ASSISTANT

## 2019-01-28 PROCEDURE — 99213 OFFICE O/P EST LOW 20 MIN: CPT | Mod: PBBFAC,PN | Performed by: PHYSICIAN ASSISTANT

## 2019-01-28 PROCEDURE — 99999 PR PBB SHADOW E&M-EST. PATIENT-LVL III: ICD-10-PCS | Mod: PBBFAC,,, | Performed by: PHYSICIAN ASSISTANT

## 2019-01-28 PROCEDURE — 99213 PR OFFICE/OUTPT VISIT, EST, LEVL III, 20-29 MIN: ICD-10-PCS | Mod: S$PBB,,, | Performed by: PHYSICIAN ASSISTANT

## 2019-01-28 PROCEDURE — 99213 OFFICE O/P EST LOW 20 MIN: CPT | Mod: S$PBB,,, | Performed by: PHYSICIAN ASSISTANT

## 2019-01-28 RX ORDER — MUPIROCIN 20 MG/G
OINTMENT TOPICAL
Qty: 30 G | Refills: 0 | Status: SHIPPED | OUTPATIENT
Start: 2019-01-28 | End: 2022-07-25

## 2019-01-28 NOTE — PATIENT INSTRUCTIONS
Continue sitz baths or warm compresses 2-3 times daily. (10-15 minutes)  Apply mupirocin ointment 2 times daily  Continue the remainder of bactrim as prescribed.

## 2019-01-28 NOTE — PROGRESS NOTES
Subjective:       Patient ID:  Neymar Victoria is a 68 y.o. male who presents for   Chief Complaint   Patient presents with    Cyst     c/o cyst to groin x 1 week,,tender and tx with soaking in warm water.     Hx of eyelid cyst, SKs, AKs, and angiomas, last seen by Dr. Valderrama 8/2018.  Here today for new c/o.   History of Present Illness: The patient presents with chief complaint of inflamed cyst.  Location: groin  Duration: 1 year, with onset of tenderness x 1 week  Signs/Symptoms: swelling, redness, tender, yellow-white drainage; denies fever or chills    Prior treatments: sitz baths TID, Bactrim DS bid (2 more days of current rx remaining) c/ improvement.    Went to see SHANTE Alexis on 1/22/19 for cyst, bacterial culture of expressed draingae + proteus, prescribed Bactrim DS and to continue sitz baths.         Review of Systems   Constitutional: Negative for fever and chills.   Gastrointestinal: Negative for nausea and vomiting.   Skin: Negative for itching, rash, sun sensitivity and daily sunscreen use.   Hematologic/Lymphatic: Does not bruise/bleed easily.        Objective:    Physical Exam   Constitutional: He appears well-developed and well-nourished. No distress.   Neurological: He is alert and oriented to person, place, and time. He is not disoriented.   Psychiatric: He has a normal mood and affect.   Skin:   Areas Examined (abnormalities noted in diagram):   Head / Face Inspection Performed  Neck Inspection Performed  Chest / Axilla Inspection Performed  Abdomen Inspection Performed  RUE Inspected  LUE Inspection Performed              Diagram Legend     Erythematous scaling macule/papule c/w actinic keratosis       Vascular papule c/w angioma      Pigmented verrucoid papule/plaque c/w seborrheic keratosis      Yellow umbilicated papule c/w sebaceous hyperplasia      Irregularly shaped tan macule c/w lentigo     1-2 mm smooth white papules consistent with Milia      Movable subcutaneous cyst with punctum c/w  epidermal inclusion cyst      Subcutaneous movable cyst c/w pilar cyst      Firm pink to brown papule c/w dermatofibroma      Pedunculated fleshy papule(s) c/w skin tag(s)      Evenly pigmented macule c/w junctional nevus     Mildly variegated pigmented, slightly irregular-bordered macule c/w mildly atypical nevus      Flesh colored to evenly pigmented papule c/w intradermal nevus       Pink pearly papule/plaque c/w basal cell carcinoma      Erythematous hyperkeratotic cursted plaque c/w SCC      Surgical scar with no sign of skin cancer recurrence      Open and closed comedones      Inflammatory papules and pustules      Verrucoid papule consistent consistent with wart     Erythematous eczematous patches and plaques     Dystrophic onycholytic nail with subungual debris c/w onychomycosis     Umbilicated papule    Erythematous-base heme-crusted tan verrucoid plaque consistent with inflamed seborrheic keratosis     Erythematous Silvery Scaling Plaque c/w Psoriasis     See annotation      Assessment / Plan:        Inflamed sebaceous cyst  -     mupirocin (BACTROBAN) 2 % ointment; AAA bid for 10 days  Dispense: 30 g; Refill: 0  Proteus infection  -     mupirocin (BACTROBAN) 2 % ointment; AAA bid for 10 days  Dispense: 30 g; Refill: 0  Improving, provided reassurance. Will continue Bactrim bid + warm compresses or sitz baths. Will add mupirocin ointment bid. Discussed inflamed cysts. If continues to be problematic, may consider excision in the future. He understands that excision can not be performed during periods of inflammation.     History of AKs  Will schedule with MD for FSE in the next 1-2 months.             Follow-up in about 1 month (around 2/28/2019) for to see Dermatology MD.

## 2019-02-09 DIAGNOSIS — I10 HYPERTENSION, ESSENTIAL, BENIGN: Chronic | ICD-10-CM

## 2019-02-11 ENCOUNTER — OFFICE VISIT (OUTPATIENT)
Dept: DERMATOLOGY | Facility: CLINIC | Age: 69
End: 2019-02-11
Payer: MEDICARE

## 2019-02-11 DIAGNOSIS — L82.1 SEBORRHEIC KERATOSIS: Primary | ICD-10-CM

## 2019-02-11 DIAGNOSIS — Z80.8 FAMILY HISTORY OF MELANOMA: ICD-10-CM

## 2019-02-11 DIAGNOSIS — D22.9 MULTIPLE NEVI: ICD-10-CM

## 2019-02-11 PROCEDURE — 99213 OFFICE O/P EST LOW 20 MIN: CPT | Mod: S$PBB,,, | Performed by: DERMATOLOGY

## 2019-02-11 PROCEDURE — 99999 PR PBB SHADOW E&M-EST. PATIENT-LVL III: ICD-10-PCS | Mod: PBBFAC,,, | Performed by: DERMATOLOGY

## 2019-02-11 PROCEDURE — 99999 PR PBB SHADOW E&M-EST. PATIENT-LVL III: CPT | Mod: PBBFAC,,, | Performed by: DERMATOLOGY

## 2019-02-11 PROCEDURE — 99213 OFFICE O/P EST LOW 20 MIN: CPT | Mod: PBBFAC,PN | Performed by: DERMATOLOGY

## 2019-02-11 PROCEDURE — 99213 PR OFFICE/OUTPT VISIT, EST, LEVL III, 20-29 MIN: ICD-10-PCS | Mod: S$PBB,,, | Performed by: DERMATOLOGY

## 2019-02-11 RX ORDER — METOPROLOL SUCCINATE 200 MG/1
TABLET, EXTENDED RELEASE ORAL
Qty: 90 TABLET | Refills: 1 | Status: SHIPPED | OUTPATIENT
Start: 2019-02-11 | End: 2019-08-08 | Stop reason: SDUPTHER

## 2019-02-11 NOTE — PATIENT INSTRUCTIONS
Monitoring Moles  Moles, also called nevi, are small, pigmented (colored) marks on the skin. They have no known purpose. Many moles appear before age 30, but they also increase frequently as people age. Moles most often are benign (not cancer) and harmless. But some become cancerous. Thats why you need to watch the moles on your body and tell your health care provider about any concern you.    What are moles?  Moles are a type of pigmented simeon. Freckles, which often are sprinkled across the bridge of the nose, the cheeks, and the arms, are another type of pigmented simeon. Moles can appear on any part of the body. There are many types, sizes, and shapes of moles. Most moles are solid brown. In most cases, they are flat or dome-shaped, smooth, and have well-defined edges. Freckles are flat.  Why worry about moles?  Most moles are benign and dont require treatment. You can have moles removed if you dont like the way they look or feel. But moles that appear after you are 30 or that change in certain ways may become a problem. These moles may turn into melanoma, a type of skin cancer. Melanoma is one of the fastest growing cancers in the [place], but it is often curable if caught early. But this disease can be life-threatening, particularly when not diagnosed early. The more moles someone has, the higher the risk. Risk is also higher for those who have had more lifetime exposure to the sun, severe blistering sunburns, exposure to tanning beds, a prior personal history of cancer, and those with a family history of skin cancer. To manage your risk, its smart to check your moles for changes and ask your health care provider to perform a thorough skin exam when you have a physical exam. To do this, you first need to learn where your moles are. Then, be sure to check your moles each month.    Checking your moles  You can check many of your moles each month. You can do this right after you shower and before you get  dressed. Check your body from head to toe. Then, make a list of your moles. If you find any new moles or changes in your moles, call your health care provider. To check your moles, youll need:  · A full-length mirror  · A stool or chair to sit on while you check your feet  If you have a lot of moles, take digital photos of them each month. Make sure to take photos both up close and from a distance. These can help you see if any moles change over time.  When to seek medical treatment  See your health care provider if your moles hurt, itch, ooze, bleed, thicken, become crusty, or show other changes. Also, be sure to call your health care provider if your moles show any of the following signs of melanoma:  · A change in size, shape, color, or elevation  · Asymmetry (when the sides dont match)  · Ragged, notched, or blurred borders  · Varied colors within the same mole  · Size is larger than 5 mm or 6 mm in diameter (the size of a pencil eraser)  © 9981-6921 [employer]. [address] [place] [address]. All rights reserved. This information is not intended as a substitute for professional medical care. Always follow your healthcare professional's instructions.

## 2019-02-11 NOTE — PROGRESS NOTES
Subjective:       Patient ID:  Neymar Victoria is a 68 y.o. male who presents for   Chief Complaint   Patient presents with    Skin Check     TBSE,,hx of AKs     Hx of SK's, AK's, family history of melanoma (father), and angiomas, last seen by CLIFFORD Goldberg 1/28/19. Hx of inflamed cyst of the mons pubis, s/p mupirocin with improvement.  This is a high risk patient here to check for the development of new lesions.      Denies bleeding, tender, growing, or concerning lesions.     The patient denies personal or family history of skin cancer.            Review of Systems   Constitutional: Negative for fever and chills.   Gastrointestinal: Negative for nausea and vomiting.   Skin: Positive for activity-related sunscreen use. Negative for daily sunscreen use and recent sunburn.   Hematologic/Lymphatic: Does not bruise/bleed easily.        Objective:    Physical Exam   Constitutional: He appears well-developed and well-nourished. No distress.   Neurological: He is alert and oriented to person, place, and time. He is not disoriented.   Psychiatric: He has a normal mood and affect.   Skin:   Areas Examined (abnormalities noted in diagram):   Scalp / Hair Palpated and Inspected  Head / Face Inspection Performed  Neck Inspection Performed  Chest / Axilla Inspection Performed  Abdomen Inspection Performed  Genitals / Buttocks / Groin Inspection Performed  Back Inspection Performed  RUE Inspected  LUE Inspection Performed  RLE Inspected  LLE Inspection Performed  Nails and Digits Inspection Performed                   Diagram Legend     Erythematous scaling macule/papule c/w actinic keratosis       Vascular papule c/w angioma      Pigmented verrucoid papule/plaque c/w seborrheic keratosis      Yellow umbilicated papule c/w sebaceous hyperplasia      Irregularly shaped tan macule c/w lentigo     1-2 mm smooth white papules consistent with Milia      Movable subcutaneous cyst with punctum c/w epidermal inclusion cyst      Subcutaneous  movable cyst c/w pilar cyst      Firm pink to brown papule c/w dermatofibroma      Pedunculated fleshy papule(s) c/w skin tag(s)      Evenly pigmented macule c/w junctional nevus     Mildly variegated pigmented, slightly irregular-bordered macule c/w mildly atypical nevus      Flesh colored to evenly pigmented papule c/w intradermal nevus       Pink pearly papule/plaque c/w basal cell carcinoma      Erythematous hyperkeratotic cursted plaque c/w SCC      Surgical scar with no sign of skin cancer recurrence      Open and closed comedones      Inflammatory papules and pustules      Verrucoid papule consistent consistent with wart     Erythematous eczematous patches and plaques     Dystrophic onycholytic nail with subungual debris c/w onychomycosis     Umbilicated papule    Erythematous-base heme-crusted tan verrucoid plaque consistent with inflamed seborrheic keratosis     Erythematous Silvery Scaling Plaque c/w Psoriasis     See annotation      Assessment / Plan:        Multiple nevi  Family history of melanoma  Reassurance given.  Discussed ABCDEF of melanoma and changes for patient to look for.  AAD Handout given. Discussed importance of daily use of sunscreen which is broad-spectrum and has a minimum SPF of 30.    Seborrheic keratosis  Reassurance given. Discussed diagnosis and that lesions are benign.  AAD handout given.                  Follow-up in about 1 year (around 2/11/2020).

## 2019-02-13 DIAGNOSIS — I10 ESSENTIAL HYPERTENSION: ICD-10-CM

## 2019-02-13 RX ORDER — HYDROCHLOROTHIAZIDE 25 MG/1
TABLET ORAL
Qty: 90 TABLET | Refills: 1 | Status: SHIPPED | OUTPATIENT
Start: 2019-02-13 | End: 2019-08-08 | Stop reason: SDUPTHER

## 2019-03-11 ENCOUNTER — PATIENT OUTREACH (OUTPATIENT)
Dept: OTHER | Facility: OTHER | Age: 69
End: 2019-03-11

## 2019-03-11 NOTE — PROGRESS NOTES
Last 5 Patient Entered Readings                                      Current 30 Day Average: 126/70     Recent Readings 3/5/2019 3/4/2019 2/22/2019 2/20/2019 2/18/2019    SBP (mmHg) 130 131 113 127 128    DBP (mmHg) 70 71 68 69 69    Pulse 62 60 62 61 63        3/11: Called patient to introduce myself as new health .  Left VM.  BP is well controlled.

## 2019-03-20 ENCOUNTER — LAB VISIT (OUTPATIENT)
Dept: LAB | Facility: HOSPITAL | Age: 69
End: 2019-03-20
Attending: FAMILY MEDICINE
Payer: MEDICARE

## 2019-03-20 DIAGNOSIS — Z13.29 THYROID DISORDER SCREEN: ICD-10-CM

## 2019-03-20 DIAGNOSIS — Z00.00 ROUTINE GENERAL MEDICAL EXAMINATION AT A HEALTH CARE FACILITY: ICD-10-CM

## 2019-03-20 DIAGNOSIS — E78.5 HYPERLIPIDEMIA, UNSPECIFIED HYPERLIPIDEMIA TYPE: ICD-10-CM

## 2019-03-20 LAB
ALBUMIN SERPL BCP-MCNC: 4.3 G/DL
ALP SERPL-CCNC: 68 U/L
ALT SERPL W/O P-5'-P-CCNC: 28 U/L
ANION GAP SERPL CALC-SCNC: 9 MMOL/L
AST SERPL-CCNC: 20 U/L
BASOPHILS # BLD AUTO: 0.03 K/UL
BASOPHILS NFR BLD: 0.3 %
BILIRUB SERPL-MCNC: 0.8 MG/DL
BUN SERPL-MCNC: 15 MG/DL
CALCIUM SERPL-MCNC: 10.2 MG/DL
CHLORIDE SERPL-SCNC: 106 MMOL/L
CHOLEST SERPL-MCNC: 189 MG/DL
CHOLEST/HDLC SERPL: 5.3 {RATIO}
CO2 SERPL-SCNC: 28 MMOL/L
CREAT SERPL-MCNC: 1 MG/DL
DIFFERENTIAL METHOD: ABNORMAL
EOSINOPHIL # BLD AUTO: 0.2 K/UL
EOSINOPHIL NFR BLD: 1.8 %
ERYTHROCYTE [DISTWIDTH] IN BLOOD BY AUTOMATED COUNT: 13.7 %
EST. GFR  (AFRICAN AMERICAN): >60 ML/MIN/1.73 M^2
EST. GFR  (NON AFRICAN AMERICAN): >60 ML/MIN/1.73 M^2
GLUCOSE SERPL-MCNC: 95 MG/DL
HCT VFR BLD AUTO: 48.2 %
HDLC SERPL-MCNC: 36 MG/DL
HDLC SERPL: 19 %
HGB BLD-MCNC: 15.6 G/DL
IMM GRANULOCYTES # BLD AUTO: 0.03 K/UL
IMM GRANULOCYTES NFR BLD AUTO: 0.3 %
LDLC SERPL CALC-MCNC: 125 MG/DL
LYMPHOCYTES # BLD AUTO: 2.1 K/UL
LYMPHOCYTES NFR BLD: 21.9 %
MCH RBC QN AUTO: 31.5 PG
MCHC RBC AUTO-ENTMCNC: 32.4 G/DL
MCV RBC AUTO: 97 FL
MONOCYTES # BLD AUTO: 0.8 K/UL
MONOCYTES NFR BLD: 8.6 %
NEUTROPHILS # BLD AUTO: 6.4 K/UL
NEUTROPHILS NFR BLD: 67.1 %
NONHDLC SERPL-MCNC: 153 MG/DL
NRBC BLD-RTO: 0 /100 WBC
PLATELET # BLD AUTO: 214 K/UL
PMV BLD AUTO: 10.2 FL
POTASSIUM SERPL-SCNC: 5 MMOL/L
PROT SERPL-MCNC: 7.2 G/DL
RBC # BLD AUTO: 4.95 M/UL
SODIUM SERPL-SCNC: 143 MMOL/L
TRIGL SERPL-MCNC: 140 MG/DL
TSH SERPL DL<=0.005 MIU/L-ACNC: 1.39 UIU/ML
WBC # BLD AUTO: 9.46 K/UL

## 2019-03-20 PROCEDURE — 80053 COMPREHEN METABOLIC PANEL: CPT

## 2019-03-20 PROCEDURE — 80061 LIPID PANEL: CPT

## 2019-03-20 PROCEDURE — 84443 ASSAY THYROID STIM HORMONE: CPT

## 2019-03-20 PROCEDURE — 36415 COLL VENOUS BLD VENIPUNCTURE: CPT

## 2019-03-20 PROCEDURE — 85025 COMPLETE CBC W/AUTO DIFF WBC: CPT

## 2019-03-26 ENCOUNTER — LAB VISIT (OUTPATIENT)
Dept: LAB | Facility: HOSPITAL | Age: 69
End: 2019-03-26
Attending: FAMILY MEDICINE
Payer: MEDICARE

## 2019-03-26 ENCOUNTER — OFFICE VISIT (OUTPATIENT)
Dept: INTERNAL MEDICINE | Facility: CLINIC | Age: 69
End: 2019-03-26
Payer: MEDICARE

## 2019-03-26 VITALS
TEMPERATURE: 98 F | WEIGHT: 184.5 LBS | OXYGEN SATURATION: 96 % | DIASTOLIC BLOOD PRESSURE: 80 MMHG | BODY MASS INDEX: 29.78 KG/M2 | SYSTOLIC BLOOD PRESSURE: 138 MMHG | HEART RATE: 58 BPM

## 2019-03-26 DIAGNOSIS — Z12.11 COLON CANCER SCREENING: ICD-10-CM

## 2019-03-26 DIAGNOSIS — E78.5 HYPERLIPIDEMIA WITH TARGET LOW DENSITY LIPOPROTEIN (LDL) CHOLESTEROL LESS THAN 160 MG/DL: Chronic | ICD-10-CM

## 2019-03-26 DIAGNOSIS — Z72.0 TOBACCO USE: Chronic | ICD-10-CM

## 2019-03-26 DIAGNOSIS — K63.5 POLYP OF COLON, UNSPECIFIED PART OF COLON, UNSPECIFIED TYPE: ICD-10-CM

## 2019-03-26 DIAGNOSIS — I10 ESSENTIAL HYPERTENSION: Primary | ICD-10-CM

## 2019-03-26 LAB — VIT B12 SERPL-MCNC: 394 PG/ML (ref 210–950)

## 2019-03-26 PROCEDURE — 99999 PR PBB SHADOW E&M-EST. PATIENT-LVL IV: ICD-10-PCS | Mod: PBBFAC,,, | Performed by: FAMILY MEDICINE

## 2019-03-26 PROCEDURE — 99214 OFFICE O/P EST MOD 30 MIN: CPT | Mod: S$PBB,,, | Performed by: FAMILY MEDICINE

## 2019-03-26 PROCEDURE — 99214 PR OFFICE/OUTPT VISIT, EST, LEVL IV, 30-39 MIN: ICD-10-PCS | Mod: S$PBB,,, | Performed by: FAMILY MEDICINE

## 2019-03-26 PROCEDURE — 82607 VITAMIN B-12: CPT

## 2019-03-26 PROCEDURE — 99999 PR PBB SHADOW E&M-EST. PATIENT-LVL IV: CPT | Mod: PBBFAC,,, | Performed by: FAMILY MEDICINE

## 2019-03-26 PROCEDURE — 36415 COLL VENOUS BLD VENIPUNCTURE: CPT

## 2019-03-26 PROCEDURE — 99214 OFFICE O/P EST MOD 30 MIN: CPT | Mod: PBBFAC | Performed by: FAMILY MEDICINE

## 2019-03-26 RX ORDER — AMLODIPINE AND VALSARTAN 10; 160 MG/1; MG/1
1 TABLET ORAL DAILY
Qty: 90 TABLET | Refills: 3 | Status: SHIPPED | OUTPATIENT
Start: 2019-03-26 | End: 2020-03-16

## 2019-03-26 NOTE — PROGRESS NOTES
Subjective:       Patient ID: Neymar Victoria is a 68 y.o. male.    Chief Complaint: Annual Exam and burning on bottom of feet    Patient presents to clinic today for annual physical exam.  Patient reports bilateral burning to the bottoms of his feet intermittently for more than a year.  He also reports that at times his ankles feel tight.  Patient also states that his digital hypertension blood pressure cuff does not seem to be consistent.  He has another home blood pressure cuff that has more consistent readings.  Patient is otherwise without concerns today.    Review of Systems   Constitutional: Negative for activity change and unexpected weight change.   HENT: Negative for hearing loss, rhinorrhea and trouble swallowing.    Eyes: Negative for discharge and visual disturbance.   Respiratory: Negative for chest tightness and wheezing.    Cardiovascular: Negative for chest pain and palpitations.   Gastrointestinal: Negative for blood in stool, constipation, diarrhea and vomiting.   Endocrine: Negative for polydipsia and polyuria.   Genitourinary: Negative for difficulty urinating, hematuria and urgency.   Musculoskeletal: Negative for arthralgias, joint swelling and neck pain.   Neurological: Negative for weakness and headaches.   Psychiatric/Behavioral: Negative for confusion and dysphoric mood.       Objective:      Physical Exam   Constitutional: He is oriented to person, place, and time. He appears well-developed and well-nourished. No distress.   HENT:   Head: Normocephalic and atraumatic.   Eyes: Pupils are equal, round, and reactive to light. Conjunctivae and EOM are normal. No scleral icterus.   Cardiovascular: Normal rate and regular rhythm. Exam reveals no gallop and no friction rub.   No murmur heard.  Pulses:       Dorsalis pedis pulses are 2+ on the right side, and 2+ on the left side.        Posterior tibial pulses are 2+ on the right side, and 2+ on the left side.   Pulmonary/Chest: Effort normal and  breath sounds normal.   Musculoskeletal:        Right foot: Normal.        Left foot: Normal.   Neurological: He is alert and oriented to person, place, and time. No cranial nerve deficit. Gait normal.   Psychiatric: He has a normal mood and affect.   Vitals reviewed.      Assessment:       1. Essential hypertension    2. Hyperlipidemia with target low density lipoprotein (LDL) cholesterol less than 160 mg/dL    3. Polyp of colon, unspecified part of colon, unspecified type    4. Tobacco use    5. Paresthesias/numbness    6. Colon cancer screening        Plan:     Problem List Items Addressed This Visit     Essential hypertension - Primary (Chronic)    Current Assessment & Plan     Discontinue Norvasc-benazepril; start Norvasc-valsartan; continue hydrochlorothiazide and metoprolol; return in 1 month for blood pressure check with nurse; advised to stop at [facility] to see if they can recalibrate his BP cuff; Patient expressed understanding.           Relevant Medications    amlodipine-valsartan (EXFORGE)  mg per tablet    Hyperlipidemia with target low density lipoprotein (LDL) cholesterol less than 160 mg/dL (Chronic)    Current Assessment & Plan     Lipid panel normal, except for low HDL 36         Tobacco use (Chronic)    Current Assessment & Plan     Patient reports he has cut down, encouraged cessation         Colon polyps    Current Assessment & Plan     Colonoscopy ordered           Paresthesias/numbness    Current Assessment & Plan     Referral to Orthopedics as I suspect tarsal tunnel syndrome; check B12 level         Relevant Orders    Ambulatory Referral to Orthopedics    Vitamin B12      Other Visit Diagnoses     Colon cancer screening        Relevant Orders    Case request GI: COLONOSCOPY (Completed)          Health Maintenance reviewed/updated.

## 2019-03-26 NOTE — ASSESSMENT & PLAN NOTE
Discontinue Norvasc-benazepril; start Norvasc-valsartan; continue hydrochlorothiazide and metoprolol; return in 1 month for blood pressure check with nurse; advised to stop at [facility] to see if they can recalibrate his BP cuff; Patient expressed understanding.

## 2019-03-29 DIAGNOSIS — E53.8 VITAMIN B12 DEFICIENCY: Primary | ICD-10-CM

## 2019-03-29 RX ORDER — LANOLIN ALCOHOL/MO/W.PET/CERES
1000 CREAM (GRAM) TOPICAL DAILY
COMMUNITY
Start: 2019-03-29

## 2019-04-03 ENCOUNTER — OFFICE VISIT (OUTPATIENT)
Dept: PODIATRY | Facility: CLINIC | Age: 69
End: 2019-04-03
Payer: MEDICARE

## 2019-04-03 VITALS
BODY MASS INDEX: 29.83 KG/M2 | DIASTOLIC BLOOD PRESSURE: 75 MMHG | HEART RATE: 57 BPM | RESPIRATION RATE: 17 BRPM | SYSTOLIC BLOOD PRESSURE: 142 MMHG | HEIGHT: 66 IN | WEIGHT: 185.63 LBS

## 2019-04-03 DIAGNOSIS — Z72.0 TOBACCO USE: Chronic | ICD-10-CM

## 2019-04-03 DIAGNOSIS — M48.07 LUMBOSACRAL STENOSIS WITH NEUROGENIC CLAUDICATION: Primary | ICD-10-CM

## 2019-04-03 DIAGNOSIS — M54.17 RADICULOPATHY, LUMBOSACRAL REGION: ICD-10-CM

## 2019-04-03 PROCEDURE — 99213 OFFICE O/P EST LOW 20 MIN: CPT | Mod: PBBFAC | Performed by: PODIATRIST

## 2019-04-03 PROCEDURE — 99214 PR OFFICE/OUTPT VISIT, EST, LEVL IV, 30-39 MIN: ICD-10-PCS | Mod: S$PBB,,, | Performed by: PODIATRIST

## 2019-04-03 PROCEDURE — 99999 PR PBB SHADOW E&M-EST. PATIENT-LVL III: CPT | Mod: PBBFAC,,, | Performed by: PODIATRIST

## 2019-04-03 PROCEDURE — 99214 OFFICE O/P EST MOD 30 MIN: CPT | Mod: S$PBB,,, | Performed by: PODIATRIST

## 2019-04-03 PROCEDURE — 99999 PR PBB SHADOW E&M-EST. PATIENT-LVL III: ICD-10-PCS | Mod: PBBFAC,,, | Performed by: PODIATRIST

## 2019-04-03 NOTE — PROGRESS NOTES
Subjective:       Patient ID: Neymar Victoria is a 68 y.o. male.    Chief Complaint: Foot Problem (Bilat foot problem, pain 0/10, footwear tennis, ambulation without ginna,non diabetic, PCP Dr. Chávez)    HPI: Neymar Victoria presents to the clinic today with the complaint of persistent burning and tingling to the B/L lower extremity. Patient states these symptoms have been on going now for the past several months, and are worsening. Patient states the symptoms seem to be moreso nocturnal. Pain/Discomfort is rated at approx. 3/10. Pain/Discomfort is described as an irritant, but is occasionally sharp and shooting like. Patient does a history of lumbosacral pathology; spinal stenosis, radiculopathy, sciatica. Patient not a DMI or DMII. Patient is not currently on Gabapentin, or Lyrica or Cymbalta and/or etc... Patient states no trauma. No recent EMG/NCV is stated.     Review of patient's allergies indicates:  No Known Allergies    Past Medical History:   Diagnosis Date    Arthritis     Erectile dysfunction     Hyperlipidemia LDL goal < 160     Hypertension     Seasonal allergies        Family History   Problem Relation Age of Onset    Macular degeneration Mother     Melanoma Father     Hypertension Unknown     Stroke Paternal Grandfather     Psoriasis Neg Hx     Lupus Neg Hx        Social History     Socioeconomic History    Marital status:      Spouse name: Not on file    Number of children: Not on file    Years of education: Not on file    Highest education level: Not on file   Occupational History    Not on file   Social Needs    Financial resource strain: Not on file    Food insecurity:     Worry: Not on file     Inability: Not on file    Transportation needs:     Medical: No     Non-medical: No   Tobacco Use    Smoking status: Current Every Day Smoker     Packs/day: 0.50     Years: 30.00     Pack years: 15.00     Types: Cigarettes    Smokeless tobacco: Never Used   Substance and Sexual  "Activity    Alcohol use: Yes     Alcohol/week: 0.0 oz     Frequency: Monthly or less     Drinks per session: 1 or 2     Binge frequency: Never     Comment: social use of alchol    Drug use: No    Sexual activity: Not on file   Lifestyle    Physical activity:     Days per week: 2 days     Minutes per session: 30 min    Stress: Not at all   Relationships    Social connections:     Talks on phone: More than three times a week     Gets together: Once a week     Attends Spiritism service: Not on file     Active member of club or organization: No     Attends meetings of clubs or organizations: Never     Relationship status:    Other Topics Concern    Not on file   Social History Narrative    Not on file       Past Surgical History:   Procedure Laterality Date    COLONOSCOPY N/A 2/29/2016    Per[facility] ENDO    WISDOM TOOTH EXTRACTION         Review of Systems   Constitutional: Negative for chills, fatigue and fever.   HENT: Negative for hearing loss.    Eyes: Negative for photophobia and visual disturbance.   Respiratory: Negative for cough, chest tightness, shortness of breath and wheezing.    Cardiovascular: Negative for chest pain and palpitations.   Gastrointestinal: Negative for constipation, diarrhea, nausea and vomiting.   Endocrine: Negative for cold intolerance and heat intolerance.   Genitourinary: Negative for flank pain.   Musculoskeletal: Negative for gait problem, neck pain and neck stiffness.   Skin: Negative for wound.   Neurological: Negative for light-headedness and headaches.        Paresthesias and dysesthesias, bilateral lower extremity   Psychiatric/Behavioral: Negative for sleep disturbance.          Objective:   BP (!) 142/75 (BP Location: Right arm, Patient Position: Sitting, BP Method: Medium (Automatic))   Pulse (!) 57   Resp 17   Ht 5' 6" (1.676 m)   Wt 84.2 kg (185 lb 10 oz)   BMI 29.96 kg/m²     Narrative     DATE OF EXAM: Oct 24 2012 "      BOC   0150  -  PELVIS 1 OR 2 VIEWS:   \  [id number]     CLINICAL HISTORY:   \729.5 0 PAIN IN LIMB     PROCEDURE COMMENT:   \     ICD 9 CODE(S):   (\)     CPT 4 CODE(S)/MODIFIER(S):   (\)     Comparison: None      Findings:     Degenerative changes noted in the lumbosacral spine and hips.  No   fracture or dislocation.  Pelvic ring is intact.  Small metallic density   projects over the medial soft tissues proximal right thigh.        Impression:       1.  Degenerative changes lumbosacral spine and bilateral hips without   fracture or dislocation.     2.  Small metallic foreign body projects over the soft tissues medial   aspect proximal right thigh.   ______________________________________      Electronically signed by: LAUREN HOLLAND III, MD  Date:     10/24/12  Time:    13:01             LOWER EXTREMITY PHYSICAL EXAMINATION  NEUROLOGY: Proprioception is intact. Sensation to light touch is intact. Protective sensation is intact via 5.07 [place] monofilament. Straight leg raise is negative. Negative Tinel's Sign and negative Valleix Sign. No neurological sensations with compression of the area of Blackburn's Nerve in the area of the Abductor Hallucis muscle belly. Upon palpation of the interspaces, there are no neurological sensations stated that radiate proximal or distal. Upon compression of the metatarsal heads from medial to lateral, no neurological sensations or symptoms are stated. Deep tendon reflexes to the lower extremity are WNL.    VASCULAR: On the left and right foot, the dorsalis pedis pulse is 2/4 and the posterior tibial pulse is 2/4. Capillary refill time is less than 3 seconds. Hair growth is present on the dorsum of the foot and at the digits. No rubor is present. Proximal to distal temperature is warm to warm.    DERMATOLOGY: Skin is supple, dry and intact. No ecchymosis is noted. No hypertrophic skin formation. No erythema or cellulitis is noted.     ORTHOPEDIC: Manual Muscle Testing is  5/5 in all planes on the left and right, without pains, with and without resistance. No pains to palpation of the medial or lateral ankle ligaments. No discomfort to palpation of the posterior tibial tendon, peroneal tendon, Achilles tendon or the anterior ankle tendons.  Rectus foot type is noted. No pain upon range of motion of the midfoot or hindfoot joints. No crepitus upon range of motion and midfoot or hindfoot joints. No ankle pathology is noted. Gait pattern is non-antalgic.    Assessment:     1. Lumbosacral stenosis with neurogenic claudication    2. Radiculopathy, lumbosacral region    3. Tobacco use        Plan:     Lumbosacral stenosis with neurogenic claudication  Radiculopathy, lumbosacral region  Thorough discussion is had with the patient today, concerning the diagnosis, its etiology, and the treatment algorithm at present.  XRAYS are reviewed in detail with the patient. All questions and concerns regarding findings and its/their implications are outlined and discussed.  Symptoms are likely due to proximal pathology in the form of nerve compression lower back given his history of arthropathy.  As such, patient will need medical treatment at that location for definitive cure and/or alleviation of the symptoms versus initiation of Gabapentin, Lyrica or etc to treat the outcome; the painful paresthesias.  At this time, patient states the symptoms are not overly problematic, thus he will forego EMG/NCV studies and initiation of medical treatment as well as referral.    Tobacco use  Did discuss in detail the harmful effects of nicotine/tobacco/cigarette smoking, especially in relation to the lower extremity. I do rec. consultation with primary care provider for further discussed of smoking cessation methods. Smoking & Tobacco use cessation couseling was rendered at today's visit; intermediate, bewteen 3 and 10 minutes.        Future Appointments   Date Time Provider Department Center   4/29/2019  9:40 AM  Susan Chávez MD [facility]   8/26/2019  9:15 AM Sean Estevez MD [facility]   9/19/2019  8:50 AM LABORATORY, [facility]   9/26/2019  7:40 [facility]

## 2019-04-03 NOTE — LETTER
April 3, 2019      Susan Chávez MD  [address] [place] [address] Dr Julianna RODRIGUEZ [id number]           [facility]  [address] [place] [address] Julian RODRIGUEZ [address]  Phone: [phone]  Fax: [phone]          Patient: Neymar Victoria   MR Number: [id number]   YOB: 1950   Date of Visit: 4/3/2019       Dear Dr. Susan Chávez:    Thank you for referring Neymar Victoria to me for evaluation. Attached you will find relevant portions of my assessment and plan of care.    If you have questions, please do not hesitate to call me. I look forward to following Neymar Victoria along with you.    Sincerely,    Praneeth Parmar, GUMARO    Enclosure  CC:  No Recipients    If you would like to receive this communication electronically, please contact [email or web address] or [phone] to request more information on [employer] access.    For providers and/or their staff who would like to refer a patient to [facility], please contact us through our one-stop-shop provider referral line, [facility], at [phone].    If you feel you have received this communication in error or would no longer like to receive these types of communications, please e-mail [email or web address]

## 2019-04-22 ENCOUNTER — PATIENT OUTREACH (OUTPATIENT)
Dept: OTHER | Facility: OTHER | Age: 69
End: 2019-04-22

## 2019-04-22 ENCOUNTER — TELEPHONE (OUTPATIENT)
Dept: ENDOSCOPY | Facility: HOSPITAL | Age: 69
End: 2019-04-22

## 2019-04-22 ENCOUNTER — PATIENT MESSAGE (OUTPATIENT)
Dept: ADMINISTRATIVE | Facility: OTHER | Age: 69
End: 2019-04-22

## 2019-04-22 RX ORDER — SODIUM, POTASSIUM,MAG SULFATES 17.5-3.13G
1 SOLUTION, RECONSTITUTED, ORAL ORAL DAILY
Qty: 1 KIT | Refills: 0 | Status: SHIPPED | OUTPATIENT
Start: 2019-04-22 | End: 2019-04-24

## 2019-04-22 NOTE — PROGRESS NOTES
Last 5 Patient Entered Readings                                      Current 30 Day Average: 128/77     Recent Readings 4/19/2019 4/18/2019 4/15/2019 4/13/2019 4/10/2019    SBP (mmHg) 122 138 134 126 124    DBP (mmHg) 70 71 79 74 72    Pulse 70 60 62 63 59          Digital Medicine: Health  Follow Up    4/22: Left voicemail to follow up with Mr. Neymar Victoria.  Current BP average 128/77 mmHg is at goal, <130/80 mmHg.  BP is well controlled.

## 2019-04-22 NOTE — TELEPHONE ENCOUNTER
Endoscopy Scheduling Questionnaire:    1. Have you been admitted overnight to the hospital in the past 3 months? no  2. Do you get CP and SOB while walking up a flight of stairs? no  3. Have you had a stent placed in the past 12 months? no  4. Have you had a stroke or heart attack in the past 6 months? no  5. Have you had any chest pain in the past 3 months? no      If so, have you been evaluated by your PCP or Cardiologist? no  6. Do you take weight loss medications? no  7. Have you been diagnosed with Diverticulitis within the past 3 months? no  8. Are you having any GI symptoms that you feel need to be evaluated prior to your procedure? no  9. Are you on dialysis? no  10. Are you diabetic? no  11. Do you have any other health issues that you feel might limit your ability to safely have the procedure and/or sedation? no  12. Is the patient over 81 yo? no        If so, has the patient been seen by their PCP or GI in the last 3 months? N/A       -I have reviewed the last colonoscopy for recommendations regarding surveillance and bowel prep  Yes  -I have reviewed the patient's medications and allergies. He is not on high risk medication, , and will require cardiac clearance. A clearance request NA  -I have verified the pharmacy information. The prep being used is Suprep. The patient's prep instructions were sent via [facility] patient portal..    Date Endoscopy Scheduled: Date: 6/7/19

## 2019-04-29 ENCOUNTER — OFFICE VISIT (OUTPATIENT)
Dept: INTERNAL MEDICINE | Facility: CLINIC | Age: 69
End: 2019-04-29
Payer: MEDICARE

## 2019-04-29 VITALS
OXYGEN SATURATION: 95 % | TEMPERATURE: 98 F | BODY MASS INDEX: 29.78 KG/M2 | DIASTOLIC BLOOD PRESSURE: 69 MMHG | WEIGHT: 184.5 LBS | HEART RATE: 63 BPM | SYSTOLIC BLOOD PRESSURE: 132 MMHG

## 2019-04-29 DIAGNOSIS — Z23 NEED FOR 23-POLYVALENT PNEUMOCOCCAL POLYSACCHARIDE VACCINE: Primary | ICD-10-CM

## 2019-04-29 DIAGNOSIS — I10 ESSENTIAL HYPERTENSION: Chronic | ICD-10-CM

## 2019-04-29 PROCEDURE — 99999 PR PBB SHADOW E&M-EST. PATIENT-LVL IV: ICD-10-PCS | Mod: PBBFAC,,, | Performed by: FAMILY MEDICINE

## 2019-04-29 PROCEDURE — 99213 PR OFFICE/OUTPT VISIT, EST, LEVL III, 20-29 MIN: ICD-10-PCS | Mod: 25,S$PBB,, | Performed by: FAMILY MEDICINE

## 2019-04-29 PROCEDURE — 99999 PR PBB SHADOW E&M-EST. PATIENT-LVL IV: CPT | Mod: PBBFAC,,, | Performed by: FAMILY MEDICINE

## 2019-04-29 PROCEDURE — G0009 ADMIN PNEUMOCOCCAL VACCINE: HCPCS | Mod: PBBFAC

## 2019-04-29 PROCEDURE — 99214 OFFICE O/P EST MOD 30 MIN: CPT | Mod: PBBFAC | Performed by: FAMILY MEDICINE

## 2019-04-29 PROCEDURE — 99213 OFFICE O/P EST LOW 20 MIN: CPT | Mod: 25,S$PBB,, | Performed by: FAMILY MEDICINE

## 2019-05-04 NOTE — PROGRESS NOTES
Subjective:       Patient ID: Neymar Victoria is a 68 y.o. male.    Chief Complaint: Blood Pressure Check    Patient presents to clinic today for followup of blood pressure.    Review of Systems   Constitutional: Negative for activity change and unexpected weight change.   HENT: Negative for hearing loss, rhinorrhea and trouble swallowing.    Eyes: Negative for discharge and visual disturbance.   Respiratory: Negative for chest tightness and wheezing.    Cardiovascular: Negative for chest pain and palpitations.   Gastrointestinal: Negative for blood in stool, constipation, diarrhea and vomiting.   Endocrine: Negative for polydipsia and polyuria.   Genitourinary: Negative for difficulty urinating, hematuria and urgency.   Musculoskeletal: Negative for arthralgias, joint swelling and neck pain.   Neurological: Negative for weakness and headaches.   Psychiatric/Behavioral: Negative for confusion and dysphoric mood.       Objective:      Physical Exam   Constitutional: He is oriented to person, place, and time. He appears well-developed and well-nourished. No distress.   HENT:   Head: Normocephalic and atraumatic.   Eyes: Pupils are equal, round, and reactive to light. Conjunctivae and EOM are normal. No scleral icterus.   Cardiovascular: Normal rate and regular rhythm. Exam reveals no gallop and no friction rub.   No murmur heard.  Pulmonary/Chest: Effort normal and breath sounds normal.   Neurological: He is alert and oriented to person, place, and time. No cranial nerve deficit. Gait normal.   Psychiatric: He has a normal mood and affect.   Vitals reviewed.      Assessment:       1. Need for 23-polyvalent pneumococcal polysaccharide vaccine    2. Essential hypertension        Plan:     Problem List Items Addressed This Visit     Essential hypertension (Chronic)    Current Assessment & Plan     Controlled, continue hctz, amlodipine-valsartan           Other Visit Diagnoses     Need for 23-polyvalent pneumococcal  polysaccharide vaccine    -  Primary    Relevant Orders    Pneumococcal Polysaccharide Vaccine (23 Valent) (SQ/IM) (Completed)          Health Maintenance reviewed/updated.

## 2019-05-10 DIAGNOSIS — I10 HYPERTENSION, ESSENTIAL, BENIGN: Chronic | ICD-10-CM

## 2019-05-10 RX ORDER — AMLODIPINE AND BENAZEPRIL HYDROCHLORIDE 10; 40 MG/1; MG/1
CAPSULE ORAL
Qty: 90 CAPSULE | Refills: 1 | OUTPATIENT
Start: 2019-05-10

## 2019-05-13 NOTE — PROGRESS NOTES
"Last 5 Patient Entered Readings                                      Current 30 Day Average: 127/73     Recent Readings 5/12/2019 5/6/2019 4/28/2019 4/25/2019 4/19/2019    SBP (mmHg) 124 120 124 124 122    DBP (mmHg) 71 72 72 73 70    Pulse 59 67 61 53 70        Digital Medicine: Health  Follow Up    Lifestyle Modifications:    1.Dietary Modifications (Sodium intake <2,000mg/day, food labels, dining out):   Patient reports "he has not been eating right".  Patient reports he is starting to get back into his routine today.  Gave encouragement to patient.     2.Physical Activity:   Patient reports he has been staying active with doing a lot of work around the house.    3.Medication Therapy:   Patient has been compliant with the medication regimen.    4.Patient has the following medication side effects/concerns: None  (Frequency/Alleviating factors/Precipitating factors, etc.)     Follow up with Mr. Neymar Victoria completed. No further questions or concerns. Will continue to follow up to achieve health goals.  Patient is currently at goal, 127/73 mmHg does not exceed <130/80 mmHg.      "

## 2019-06-07 ENCOUNTER — ANESTHESIA EVENT (OUTPATIENT)
Dept: ENDOSCOPY | Facility: HOSPITAL | Age: 69
End: 2019-06-07
Payer: MEDICARE

## 2019-06-07 ENCOUNTER — ANESTHESIA (OUTPATIENT)
Dept: ENDOSCOPY | Facility: HOSPITAL | Age: 69
End: 2019-06-07
Payer: MEDICARE

## 2019-06-07 ENCOUNTER — HOSPITAL ENCOUNTER (OUTPATIENT)
Facility: HOSPITAL | Age: 69
Discharge: HOME | End: 2019-06-07
Attending: COLON & RECTAL SURGERY | Admitting: COLON & RECTAL SURGERY
Payer: MEDICARE

## 2019-06-07 VITALS
DIASTOLIC BLOOD PRESSURE: 69 MMHG | HEART RATE: 53 BPM | RESPIRATION RATE: 18 BRPM | HEIGHT: 66 IN | WEIGHT: 180.75 LBS | OXYGEN SATURATION: 94 % | BODY MASS INDEX: 29.05 KG/M2 | TEMPERATURE: 98 F | SYSTOLIC BLOOD PRESSURE: 127 MMHG

## 2019-06-07 DIAGNOSIS — Z12.11 SCREENING FOR COLON CANCER: ICD-10-CM

## 2019-06-07 DIAGNOSIS — K63.5 POLYP OF COLON, UNSPECIFIED PART OF COLON, UNSPECIFIED TYPE: Primary | ICD-10-CM

## 2019-06-07 PROCEDURE — 45385 COLONOSCOPY W/LESION REMOVAL: CPT | Mod: PT,,, | Performed by: COLON & RECTAL SURGERY

## 2019-06-07 PROCEDURE — 63600175 PHARM REV CODE 636 W HCPCS: Performed by: NURSE ANESTHETIST, CERTIFIED REGISTERED

## 2019-06-07 PROCEDURE — 25000003 PHARM REV CODE 250: Performed by: COLON & RECTAL SURGERY

## 2019-06-07 PROCEDURE — 88305 TISSUE SPECIMEN TO PATHOLOGY - SURGERY: ICD-10-PCS | Mod: 26,,, | Performed by: PATHOLOGY

## 2019-06-07 PROCEDURE — 25000003 PHARM REV CODE 250: Performed by: NURSE ANESTHETIST, CERTIFIED REGISTERED

## 2019-06-07 PROCEDURE — 37000009 HC ANESTHESIA EA ADD 15 MINS: Performed by: COLON & RECTAL SURGERY

## 2019-06-07 PROCEDURE — 45380 COLONOSCOPY AND BIOPSY: CPT | Performed by: COLON & RECTAL SURGERY

## 2019-06-07 PROCEDURE — 27201089 HC SNARE, DISP (ANY): Performed by: COLON & RECTAL SURGERY

## 2019-06-07 PROCEDURE — 88305 TISSUE EXAM BY PATHOLOGIST: CPT | Mod: 26,,, | Performed by: PATHOLOGY

## 2019-06-07 PROCEDURE — 45385 PR COLONOSCOPY,REMV LESN,SNARE: ICD-10-PCS | Mod: PT,,, | Performed by: COLON & RECTAL SURGERY

## 2019-06-07 PROCEDURE — 45380 COLONOSCOPY AND BIOPSY: CPT | Mod: 59,,, | Performed by: COLON & RECTAL SURGERY

## 2019-06-07 PROCEDURE — 45385 COLONOSCOPY W/LESION REMOVAL: CPT | Performed by: COLON & RECTAL SURGERY

## 2019-06-07 PROCEDURE — 88305 TISSUE EXAM BY PATHOLOGIST: CPT | Mod: 59 | Performed by: PATHOLOGY

## 2019-06-07 PROCEDURE — 45380 PR COLONOSCOPY,BIOPSY: ICD-10-PCS | Mod: 59,,, | Performed by: COLON & RECTAL SURGERY

## 2019-06-07 PROCEDURE — 37000008 HC ANESTHESIA 1ST 15 MINUTES: Performed by: COLON & RECTAL SURGERY

## 2019-06-07 PROCEDURE — 27201012 HC FORCEPS, HOT/COLD, DISP: Performed by: COLON & RECTAL SURGERY

## 2019-06-07 RX ORDER — PROPOFOL 10 MG/ML
VIAL (ML) INTRAVENOUS
Status: DISCONTINUED | OUTPATIENT
Start: 2019-06-07 | End: 2019-06-07

## 2019-06-07 RX ORDER — SODIUM CHLORIDE, SODIUM LACTATE, POTASSIUM CHLORIDE, CALCIUM CHLORIDE 600; 310; 30; 20 MG/100ML; MG/100ML; MG/100ML; MG/100ML
INJECTION, SOLUTION INTRAVENOUS CONTINUOUS
Status: DISCONTINUED | OUTPATIENT
Start: 2019-06-07 | End: 2019-06-07 | Stop reason: HOSPADM

## 2019-06-07 RX ORDER — LIDOCAINE HYDROCHLORIDE 10 MG/ML
INJECTION, SOLUTION EPIDURAL; INFILTRATION; INTRACAUDAL; PERINEURAL
Status: DISCONTINUED | OUTPATIENT
Start: 2019-06-07 | End: 2019-06-07

## 2019-06-07 RX ADMIN — PROPOFOL 50 MG: 10 INJECTION, EMULSION INTRAVENOUS at 08:06

## 2019-06-07 RX ADMIN — PROPOFOL 100 MG: 10 INJECTION, EMULSION INTRAVENOUS at 07:06

## 2019-06-07 RX ADMIN — SODIUM CHLORIDE, SODIUM LACTATE, POTASSIUM CHLORIDE, AND CALCIUM CHLORIDE: 600; 310; 30; 20 INJECTION, SOLUTION INTRAVENOUS at 08:06

## 2019-06-07 RX ADMIN — SODIUM CHLORIDE, SODIUM LACTATE, POTASSIUM CHLORIDE, AND CALCIUM CHLORIDE: 600; 310; 30; 20 INJECTION, SOLUTION INTRAVENOUS at 07:06

## 2019-06-07 RX ADMIN — LIDOCAINE HYDROCHLORIDE 50 MG: 10 INJECTION, SOLUTION EPIDURAL; INFILTRATION; INTRACAUDAL; PERINEURAL at 07:06

## 2019-06-07 RX ADMIN — PROPOFOL 100 MG: 10 INJECTION, EMULSION INTRAVENOUS at 08:06

## 2019-06-07 NOTE — ANESTHESIA PREPROCEDURE EVALUATION
06/07/2019  Neymar Victoria is a 68 y.o., male.    Pre-op Assessment    I have reviewed the Patient Summary Reports.     I have reviewed the Nursing Notes.   I have reviewed the Medications.     Review of Systems  Anesthesia Hx:  No problems with previous Anesthesia    Social:  Smoker    Cardiovascular:   Hypertension, well controlled hyperlipidemia ECG has been reviewed. Normal sinus rhythm  Nonspecific ST and/or T wave abnormalities  No previous ECGs available  Confirmed by ANILA COX MD ([id number]) on 8/28/2017 5:10:03 PM   Hepatic/GI:   Bowel Prep.        Physical Exam  General:  Well nourished    Airway/Jaw/Neck:  Airway Findings: Mouth Opening: Normal Tongue: Normal  General Airway Assessment: Adult  Mallampati: II  TM Distance: Normal, at least 6 cm      Dental:  Dental Findings: lower partial dentures   Chest/Lungs:  Chest/Lungs Findings: Clear to auscultation     Heart/Vascular:  Heart Findings: Rate: Normal             Anesthesia Plan  Type of Anesthesia, risks & benefits discussed:  Anesthesia Type:  MAC  Patient's Preference:   Intra-op Monitoring Plan: standard ASA monitors  Intra-op Monitoring Plan Comments:   Post Op Pain Control Plan: multimodal analgesia  Post Op Pain Control Plan Comments:   Induction:   IV  Beta Blocker:  Patient is on a Beta-Blocker and has received one dose within the past 24 hours (No further documentation required).       Informed Consent: Patient understands risks and agrees with Anesthesia plan.  Questions answered. Anesthesia consent signed with patient.  ASA Score: 2     Day of Surgery Review of History & Physical: I have interviewed and examined the patient. I have reviewed the patient's H&P dated:  There are no significant changes.          Ready For Surgery From Anesthesia Perspective.

## 2019-06-07 NOTE — TRANSFER OF CARE
"Anesthesia Transfer of Care Note    Patient: Neymar Victoria    Procedure(s) Performed: Procedure(s) (LRB):  COLONOSCOPY (N/A)    Patient location: GI    Anesthesia Type: MAC    Transport from OR: Transported from OR on room air with adequate spontaneous ventilation    Post pain: adequate analgesia    Post assessment: no apparent anesthetic complications    Post vital signs: stable    Level of consciousness: awake, alert and oriented    Nausea/Vomiting: no nausea/vomiting    Complications: none    Transfer of care protocol was followed      Last vitals:   Visit Vitals  /69 (BP Location: Left arm, Patient Position: Lying)   Pulse (!) 53   Temp 36.5 °C (97.7 °F)   Resp 18   Ht 5' 6" (1.676 m)   Wt 82 kg (180 lb 12.4 oz)   SpO2 (!) 94%   BMI 29.18 kg/m²     "

## 2019-06-07 NOTE — ANESTHESIA RELEASE NOTE
"Anesthesia Release from PACU Note    Patient: Neymar Victoria    Procedure(s) Performed: Procedure(s) (LRB):  COLONOSCOPY (N/A)    Anesthesia type: MAC    Post pain: Adequate analgesia    Post assessment: no apparent anesthetic complications, tolerated procedure well and no evidence of recall    Last Vitals:   Visit Vitals  /69 (BP Location: Left arm, Patient Position: Lying)   Pulse (!) 53   Temp 36.5 °C (97.7 °F)   Resp 18   Ht 5' 6" (1.676 m)   Wt 82 kg (180 lb 12.4 oz)   SpO2 (!) 94%   BMI 29.18 kg/m²       Post vital signs: stable    Level of consciousness: awake, alert  and oriented    Nausea/Vomiting: no nausea/no vomiting    Complications: none    Airway Patency: patent    Respiratory: unassisted, spontaneous ventilation, room air    Cardiovascular: stable and blood pressure at baseline    Hydration: euvolemic  "

## 2019-06-07 NOTE — [employer] PATIENT INSTRUCTIONS
Discharge Summary/Instructions after an Endoscopic Procedure  Patient Name: Neymar Victoria  Patient MRN: [id number]  Patient YOB: 1950 Friday, June 07, 2019 Jacobo Sigala MD  RESTRICTIONS:  During your procedure today, you received medications for sedation.  These   medications may affect your judgment, balance and coordination.  Therefore,   for 24 hours, you have the following restrictions:   - DO NOT drive a car, operate machinery, make legal/financial decisions,   sign important papers or drink alcohol.    ACTIVITY:  Today: no heavy lifting, straining or running due to procedural   sedation/anesthesia.  The following day: return to full activity including work.  DIET:  Eat and drink normally unless instructed otherwise.     TREATMENT FOR COMMON SIDE EFFECTS:  - Mild abdominal pain, nausea, belching, bloating or excessive gas:  rest,   eat lightly and use a heating pad.  - Sore Throat: treat with throat lozenges and/or gargle with warm salt   water.  - Because air was used during the procedure, expelling large amounts of air   from your rectum or belching is normal.  - If a bowel prep was taken, you may not have a bowel movement for 1-3 days.    This is normal.  SYMPTOMS TO WATCH FOR AND REPORT TO YOUR PHYSICIAN:  1. Abdominal pain or bloating, other than gas cramps.  2. Chest pain.  3. Back pain.  4. Signs of infection such as: chills or fever occurring within 24 hours   after the procedure.  5. Rectal bleeding, which would show as bright red, maroon, or black stools.   (A tablespoon of blood from the rectum is not serious, especially if   hemorrhoids are present.)  6. Vomiting.  7. Weakness or dizziness.  GO DIRECTLY TO THE NEAREST EMERGENCY ROOM IF YOU HAVE ANY OF THE FOLLOWING:      Difficulty breathing              Chills and/or fever over 101 F   Persistent vomiting and/or vomiting blood   Severe abdominal pain   Severe chest pain   Black, tarry stools   Bleeding- more than one tablespoon   Any  other symptom or condition that you feel may need urgent attention  Your doctor recommends these additional instructions:  If any biopsies were taken, your doctors clinic will contact you in 1 to 2   weeks with any results.  - Discharge patient to home.   - High fiber diet.   - Continue present medications.   - Await pathology results.   - Repeat colonoscopy in 3 years for surveillance and for surveillance of   multiple polyps.   - Return to primary care physician PRN.  For questions, problems or results please call your physician Jacobo Sigala MD at Work:  [phone]  If you have any questions about the above instructions, call the GI   department at [phone] or call the endoscopy unit at [phone]   from 7am until 3 pm.  [facility], EMERGENCY ROOM PHONE NUMBER:   [phone]  IF A COMPLICATION OR EMERGENCY SITUATION ARISES AND YOU ARE UNABLE TO REACH   YOUR PHYSICIAN - GO DIRECTLY TO THE EMERGENCY ROOM.  I have read or have had read to me these discharge instructions for my   procedure and have received a written copy.  I understand these   instructions and will follow-up with my physician if I have any questions.     __________________________________       _____________________________________  Nurse Signature                                          Patient/Designated   Responsible Party Signature  MD Jacobo Marmolejo MD  6/7/2019 8:32:20 AM  This report has been verified and signed electronically.  [employer]

## 2019-06-07 NOTE — H&P
[facility]  Colon and Rectal Surgery  History & Physical    Patient Name: Neymar Victoria  MRN: [id number]  Admission Date: 6/7/2019  Attending Physician: Jacobo Sigala MD  Primary Care Provider: Susan Chávez MD    Patient information was obtained from patient and medical records.    Subjective:     Chief Complaint/Reason for Admission: Here for Colonoscopy    History of Present Illness:  Patient is a 68 y.o. male presents for colonoscopy. Last cscope 3yrs ago with TAs removed. No current hematochezia, melena or change in bowel habits. No personal or fam hx of CRC or IBD.    No current facility-administered medications on file prior to encounter.      Current Outpatient Medications on File Prior to Encounter   Medication Sig    amlodipine-valsartan (EXFORGE)  mg per tablet Take 1 tablet by mouth once daily.    hydroCHLOROthiazide (HYDRODIURIL) 25 MG tablet TAKE 1 TABLET BY MOUTH EVERY DAY    metoprolol succinate (TOPROL-XL) 200 MG 24 hr tablet TAKE 1 TABLET BY MOUTH EVERY DAY IN THE EVENING    mupirocin (BACTROBAN) 2 % ointment AAA bid for 10 days    PROPYLENE GLYCOL/ (SYSTANE OPHT) Apply to eye.    sildenafil (REVATIO) 20 mg Tab Take 20 mg by mouth as needed.       Review of patient's allergies indicates:  No Known Allergies    Past Medical History:   Diagnosis Date    Arthritis     Erectile dysfunction     Hyperlipidemia LDL goal < 160     Hypertension     Seasonal allergies      Past Surgical History:   Procedure Laterality Date    COLONOSCOPY N/A 2/29/2016    [facility]    WISDOM TOOTH EXTRACTION       Family History     Problem Relation (Age of Onset)    Hypertension     Macular degeneration Mother    Melanoma Father    Stroke Paternal Grandfather        Tobacco Use    Smoking status: Current Every Day Smoker     Packs/day: 0.50     Years: 30.00     Pack years: 15.00     Types: Cigarettes    Smokeless tobacco: Never Used    Substance and Sexual Activity    Alcohol use: Yes     Alcohol/week: 0.0 oz     Frequency: Monthly or less     Drinks per session: 1 or 2     Binge frequency: Never     Comment: social use of alchol    Drug use: No    Sexual activity: Not on file     Review of Systems   Constitutional: Negative for activity change, appetite change, chills, fatigue, fever and unexpected weight change.   HENT: Negative for congestion, ear pain, sore throat and trouble swallowing.    Eyes: Negative for pain, redness and itching.   Respiratory: Negative for cough, shortness of breath and wheezing.    Cardiovascular: Negative for chest pain, palpitations and leg swelling.   Gastrointestinal: Negative for abdominal distention, abdominal pain, anal bleeding, blood in stool, constipation, diarrhea, nausea, rectal pain and vomiting.   Endocrine: Negative for cold intolerance, heat intolerance and polyuria.   Genitourinary: Negative for dysuria, flank pain, frequency and hematuria.   Musculoskeletal: Negative for gait problem, joint swelling and neck pain.   Skin: Negative for color change, rash and wound.   Allergic/Immunologic: Negative for environmental allergies and immunocompromised state.   Neurological: Negative for dizziness, speech difficulty, weakness and numbness.   Psychiatric/Behavioral: Negative for agitation, confusion and hallucinations.     Objective:     Vital Signs (Most Recent):  Temp: 97.7 °F (36.5 °C) (06/07/19 0706)  Pulse: 67 (06/07/19 0706)  Resp: 18 (06/07/19 0706)  BP: 130/60 (06/07/19 0706)  SpO2: 96 % (06/07/19 0706) Vital Signs (24h Range):  Temp:  [97.7 °F (36.5 °C)] 97.7 °F (36.5 °C)  Pulse:  [67] 67  Resp:  [18] 18  SpO2:  [96 %] 96 %  BP: (130)/(60) 130/60     Weight: 82 kg (180 lb 12.4 oz)  Body mass index is 29.18 kg/m².    Physical Exam   Constitutional: He is oriented to person, place, and time. He appears well-developed.   HENT:   Head: Normocephalic and atraumatic.   Eyes: Conjunctivae and EOM are  normal.   Neck: Normal range of motion. No thyromegaly present.   Cardiovascular: Normal rate and regular rhythm.   Pulmonary/Chest: Effort normal. No respiratory distress.   Abdominal: Soft. He exhibits no distension and no mass. There is no tenderness.   Musculoskeletal: Normal range of motion. He exhibits no edema or tenderness.   Neurological: He is alert and oriented to person, place, and time.   Skin: Skin is warm and dry. Capillary refill takes less than 2 seconds. No rash noted.   Psychiatric: He has a normal mood and affect.         Assessment/Plan:     Patient is a 68 y.o. male who presents for colonoscopy     - Ok to proceed to endoscopy suite for colonoscopy  - Consent obtained. All risks, benefits and alternatives fully explained to patient, including but not limited to bleeding, infection, perforation, and missed polyps. All questions appropriately answered to patient's satisfaction. Consent signed and placed on chart.    Active Diagnoses:    Diagnosis Date Noted POA    Screening for colon cancer [Z12.11] 06/07/2019 Not Applicable      Problems Resolved During this Admission:     VTE Risk Mitigation (From admission, onward)    None          Jacobo Sigala MD  Colon and Rectal Surgery  [facility]

## 2019-06-07 NOTE — ANESTHESIA POSTPROCEDURE EVALUATION
Anesthesia Post Evaluation    Patient: Neymar Victoria    Procedure(s) Performed: Procedure(s) (LRB):  COLONOSCOPY (N/A)    Final Anesthesia Type: MAC  Patient location during evaluation: GI PACU  Patient participation: Yes- Able to Participate  Level of consciousness: awake and alert  Post-procedure vital signs: reviewed and stable  Pain management: adequate  Airway patency: patent  PONV status at discharge: No PONV  Anesthetic complications: no      Cardiovascular status: blood pressure returned to baseline  Respiratory status: unassisted and spontaneous ventilation  Hydration status: euvolemic  Follow-up not needed.          Vitals Value Taken Time   /69 6/7/2019  8:53 AM   Temp 36.5 °C (97.7 °F) 6/7/2019  8:33 AM   Pulse 53 6/7/2019  8:53 AM   Resp 18 6/7/2019  8:53 AM   SpO2 94 % 6/7/2019  8:53 AM         No case tracking events are documented in the log.      Pain/Efrain Score: Efrain Score: 10 (6/7/2019  8:53 AM)

## 2019-06-07 NOTE — DISCHARGE INSTRUCTIONS
Understanding Colon and Rectal Polyps    The colon (also called the large intestine) is a muscular tube that forms the last part of the digestive tract. It absorbs water and stores food waste. The colon is about 4 to 6 feet long. The rectum is the last 6 inches of the colon. The colon and rectum have a smooth lining composed of millions of cells. Changes in these cells can lead to growths in the colon that can become cancerous and should be removed. Multiple tests are available to screen for colon cancer, but the colonoscopy is the most recommended test. During colonoscopy, these polyps can be removed. How often you need this test depends on many things including your condition, your family history, symptoms, and what the findings were at the previous colonoscopy.   When the colon lining changes  Changes that happen in the cells that line the colon or rectum can lead to growths called polyps. Over a period of years, polyps can turn cancerous. Removing polyps early may prevent cancer from ever forming.  Polyps  Polyps are fleshy clumps of tissue that form on the lining of the colon or rectum. Small polyps are usually benign (not cancerous). However, over time, cells in a polyp can change and become cancerous. Certain types of polyps known as adenomatous polyps are premalignant. The risk for invasive cancer increases with the size of the polyp and certain cell and gene features. This means that they can become cancerous if they're not removed. Hyperplastic polyps are benign. They can grow quite large and not turn cancerous.   Cancer  Almost all colorectal cancers start when polyp cells begin growing abnormally. As a cancerous tumor grows, it may involve more and more of the colon or rectum. In time, cancer can also grow beyond the colon or rectum and spread to nearby organs or to glands called lymph nodes. The cells can also travel to other parts of the body. This is known as metastasis. The earlier a cancerous  tumor is removed, the better the chance of preventing its spread.    Date Last Reviewed: 8/1/2016  © 6541-4889 [employer]. [address] [place] [address], [place] [address]. All rights reserved. This information is not intended as a substitute for professional medical care. Always follow your healthcare professional's instructions.        Understanding Diverticulosis and Diverticulitis     Pouches or diverticula usually occur in the lower part of the colon called the sigmoid.     The colon (large intestine) is the last part of the digestive tract. It absorbs water from stool and changes it from a liquid to a solid. In certain cases, small pouches called diverticula can form in the colon wall. This condition is called diverticulosis. The pouches can become infected. If this happens, it becomes a more serious problem called diverticulitis. These problems can be painful. But they can be managed.  Managing your condition  Diet changes or medicines may be prescribed.   If you have diverticulosis  Recommendations include:  · Diet changes are often enough to control symptoms. The main changes are adding fiber (roughage) and drinking more water. Fiber absorbs water as it travels through your colon. This helps your stool stay soft and move smoothly. Water helps this process.  · If needed, you may be told to take over-the-counter stool softeners.  · To help relieve pain, antispasmodic medicines may be prescribed.  · Watch for changes in your bowel movements. Tell the healthcare provider if you notice any changes.  · Begin an exercise program. Ask your healthcare provider how to get started.  · Get plenty of rest and sleep.   If you have diverticulitis  Treatment depends on how bad your symptoms are.  · For mild symptoms. You may be put on a liquid diet for a short time. Antibiotics are usually prescribed. If these two steps relieve your symptoms, you may then be prescribed a high-fiber diet. If you still have symptoms,  your healthcare provider will discuss more treatment choices with you.  · For severe symptoms. You may need to be admitted to the hospital. There, you can be given IV antibiotics and fluids. You will also be put on a low-fiber or liquid diet. Although not common, surgery is needed in some people with severe symptoms.  [place] to colon health     Diverticulitis occurs when the pouches become infected or inflamed.     Help keep your colon healthy with a diet that includes plenty of high-fiber fruits, vegetables, and whole grains. Drink plenty of liquids like water and juice. Maintain a healthy lifestyle including regular exercise, stress management, and adequate rest and sleep.   Date Last Reviewed: 7/1/2016  © 1887-5880 [employer]. [address] [place] [address], [place] [address]. All rights reserved. This information is not intended as a substitute for professional medical care. Always follow your healthcare professional's instructions.

## 2019-06-07 NOTE — BRIEF OP NOTE
[facility] -   Brief Operative Note     SUMMARY     Surgery Date: 6/7/2019     Surgeon(s) and Role:     * Jacobo Sigala MD - Primary    Assisting Surgeon: None    Pre-op Diagnosis:  Screening [Z13.9]    Post-op Diagnosis:  Post-Op Diagnosis Codes:     * Screening [Z13.9]    Procedure(s) (LRB):  COLONOSCOPY (N/A)    Anesthesia: N/A    Description of the findings of the procedure: See Op Note    Findings/Key Components: See Op Note    Estimated Blood Loss: * No values recorded between 6/7/2019 12:00 AM and 6/7/2019  8:27 AM *         Specimens:   Specimen (12h ago, onward)    Start     Ordered    06/07/19 0805  Specimen to Pathology - Surgery  Once     Comments:  #1 transverse colon polyps#2 descending colon polyps#3 descending colon polyp#4 sigmoid colon polyps     Start Status     06/07/19 0805 Collected (06/07/19 0830) Order ID: [id number]       06/07/19 0830          Discharge Note    SUMMARY     Admit Date: 6/7/2019    Discharge Date and Time: 6/7/2019 8:32 AM    Hospital Course Patient was seen in the preoperative area by both myself and anesthesia. All consents were verified and all questions appropriately answered. All risks, benefits and alternatives explained to patient. Patient proceeded to endoscopy suite for colonoscopy and was discharged home postoperative once cleared by anesthesia.    Final Diagnosis: Post-Op Diagnosis Codes:     * Screening [Z13.9]    Disposition: Home or Self Care    Follow Up/Patient Instructions: See Provation report    Medications:  Reconciled Home Medications:      Medication List      CONTINUE taking these medications    amlodipine-valsartan  mg per tablet  Commonly known as:  EXFORGE  Take 1 tablet by mouth once daily.     cyanocobalamin 1000 MCG tablet  Commonly known as:  VITAMIN B-12  Take 1 tablet (1,000 mcg total) by mouth once daily.     hydroCHLOROthiazide 25 MG tablet  Commonly known as:  HYDRODIURIL  TAKE 1 TABLET BY MOUTH EVERY DAY      metoprolol succinate 200 MG 24 hr tablet  Commonly known as:  TOPROL-XL  TAKE 1 TABLET BY MOUTH EVERY DAY IN THE EVENING     mupirocin 2 % ointment  Commonly known as:  BACTROBAN  AAA bid for 10 days     sildenafil 20 mg Tab  Commonly known as:  REVATIO  Take 20 mg by mouth as needed.     SYSTANE OPHT  Apply to eye.          Discharge Procedure Orders   Diet general     Call MD for:  temperature >100.4     Call MD for:  persistent nausea and vomiting     Call MD for:  severe uncontrolled pain     Call MD for:  difficulty breathing, headache or visual disturbances     Call MD for:  redness, tenderness, or signs of infection (pain, swelling, redness, odor or green/yellow discharge around incision site)     Call MD for:  hives     Call MD for:  persistent dizziness or light-headedness     Call MD for:  extreme fatigue     Activity as tolerated     Follow-up Information     Susan Chávez MD.    Specialty:  Family Medicine  Why:  As needed  Contact information:  [id number] [place] [id number] [phone]

## 2019-06-14 NOTE — PROGRESS NOTES
Called patient and discussed pathology results from recent colonoscopy which showed that 4 polyps removed were benign tubular adenomas which can be pre cancers in the remaining polyps were hyperplastic which have no cancer potential.  Recommend keeping repeat surveillance colonoscopy at a 3 year interval.  This was conveyed to the patient via phone and via patient portal.

## 2019-06-24 ENCOUNTER — PATIENT OUTREACH (OUTPATIENT)
Dept: OTHER | Facility: OTHER | Age: 69
End: 2019-06-24

## 2019-06-24 NOTE — PROGRESS NOTES
Last 5 Patient Entered Readings                                      Current 30 Day Average: 124/68     Recent Readings 6/20/2019 6/17/2019 6/16/2019 6/13/2019 6/12/2019    SBP (mmHg) 125 124 133 116 141    DBP (mmHg) 67 71 69 68 70    Pulse 59 55 55 54 61        LVM to discuss BP readings and medications.

## 2019-07-08 ENCOUNTER — PATIENT OUTREACH (OUTPATIENT)
Dept: OTHER | Facility: OTHER | Age: 69
End: 2019-07-08

## 2019-07-08 NOTE — PROGRESS NOTES
"Last 5 Patient Entered Readings                                      Current 30 Day Average: 127/71     Recent Readings 7/7/2019 7/1/2019 7/1/2019 6/26/2019 6/26/2019    SBP (mmHg) 122 127 133 127 141    DBP (mmHg) 63 73 72 71 74    Pulse 59 62 61 57 58        Digital Medicine: Health  Follow Up    BP is controlled.  Patient reports everything is going good "and is about the same".    Lifestyle Modifications:    1.Dietary Modifications (Sodium intake <2,000mg/day, food labels, dining out):   Patient states he continues to watch what he is eating.  Patient reports he knows when he eats too much salt.  Patient states he splurges every once in a while.      2.Physical Activity:   Patient reports he has not been as active due to the heat outside.  Patient reports he does have a stationary bike.  Encouraged patient to be sure to ride his bike in place of doing activity outside.  Patient reports he usually rides his bike x2/day 10-15min each time.  Gave encouragement to patient.     3.Medication Therapy:   Patient has been compliant with the medication regimen.    4.Patient has the following medication side effects/concerns: None  (Frequency/Alleviating factors/Precipitating factors, etc.)     Follow up with Mr. Neymar Victoria completed. No further questions or concerns. Will continue to follow up to achieve health goals.  Patient is currently at goal, 127/71 mmHg does not exceed <130/80 mmHg.      "

## 2019-07-26 NOTE — PROGRESS NOTES
Last 5 Patient Entered Readings                                      Current 30 Day Average: 128/73     Recent Readings 7/23/2019 7/21/2019 7/21/2019 7/21/2019 7/7/2019    SBP (mmHg) 130 134 141 141 122    DBP (mmHg) 68 72 78 79 63    Pulse 58 58 56 55 59        LVM to discuss BP readings and medications.

## 2019-08-08 DIAGNOSIS — I10 ESSENTIAL HYPERTENSION: ICD-10-CM

## 2019-08-08 DIAGNOSIS — I10 HYPERTENSION, ESSENTIAL, BENIGN: Chronic | ICD-10-CM

## 2019-08-08 RX ORDER — METOPROLOL SUCCINATE 200 MG/1
TABLET, EXTENDED RELEASE ORAL
Qty: 90 TABLET | Refills: 1 | Status: SHIPPED | OUTPATIENT
Start: 2019-08-08 | End: 2020-02-01

## 2019-08-08 RX ORDER — HYDROCHLOROTHIAZIDE 25 MG/1
TABLET ORAL
Qty: 90 TABLET | Refills: 1 | Status: SHIPPED | OUTPATIENT
Start: 2019-08-08 | End: 2020-02-01

## 2019-08-19 ENCOUNTER — PATIENT MESSAGE (OUTPATIENT)
Dept: ADMINISTRATIVE | Facility: OTHER | Age: 69
End: 2019-08-19

## 2019-08-26 ENCOUNTER — OFFICE VISIT (OUTPATIENT)
Dept: OPHTHALMOLOGY | Facility: CLINIC | Age: 69
End: 2019-08-26
Payer: MEDICARE

## 2019-08-26 DIAGNOSIS — H25.13 NUCLEAR SCLEROSIS, BILATERAL: Primary | ICD-10-CM

## 2019-08-26 DIAGNOSIS — H04.123 DRY EYE SYNDROME, BILATERAL: ICD-10-CM

## 2019-08-26 DIAGNOSIS — H02.825 CYST OF LEFT LOWER EYELID: ICD-10-CM

## 2019-08-26 PROCEDURE — 99999 PR PBB SHADOW E&M-EST. PATIENT-LVL II: CPT | Mod: PBBFAC,,, | Performed by: OPHTHALMOLOGY

## 2019-08-26 PROCEDURE — 92014 PR EYE EXAM, EST PATIENT,COMPREHESV: ICD-10-PCS | Mod: S$PBB,,, | Performed by: OPHTHALMOLOGY

## 2019-08-26 PROCEDURE — 99212 OFFICE O/P EST SF 10 MIN: CPT | Mod: PBBFAC | Performed by: OPHTHALMOLOGY

## 2019-08-26 PROCEDURE — 99999 PR PBB SHADOW E&M-EST. PATIENT-LVL II: ICD-10-PCS | Mod: PBBFAC,,, | Performed by: OPHTHALMOLOGY

## 2019-08-26 PROCEDURE — 92014 COMPRE OPH EXAM EST PT 1/>: CPT | Mod: S$PBB,,, | Performed by: OPHTHALMOLOGY

## 2019-08-26 NOTE — PROGRESS NOTES
HPI     Patient returns for his yearly exam and states no vision changes since   last visit.Patient declines manifest today due to excellent vision.        COAG Suspect  FXHX Macula Degeneration  Cataracts OU  Dry Eyes    OU Systane PRN    Last edited by ANEUDY Rodriguez on 8/26/2019  9:19 AM. (History)            Assessment /Plan     For exam results, see Encounter Report.      ICD-10-CM ICD-9-CM    1. Nuclear sclerosis, bilateral H25.13 366.16   You were found to have an early cataract in your eye(s) today, however the cataract is not affecting your activities of daily living, such as reading and driving.You do not need  surgery at this time. We will recheck your cataract at your next visit. You are welcome to call for an earlier appointment if your vision gets worse.      2. Dry eye syndrome, bilateral H04.123 375.15 Stable- follow    3. Cyst of left lower eyelid H02.825 374.84 Follow        Return to clinic 1 year   OU Systane PRN

## 2019-09-09 ENCOUNTER — PATIENT OUTREACH (OUTPATIENT)
Dept: OTHER | Facility: OTHER | Age: 69
End: 2019-09-09

## 2019-09-19 ENCOUNTER — LAB VISIT (OUTPATIENT)
Dept: LAB | Facility: HOSPITAL | Age: 69
End: 2019-09-19
Attending: FAMILY MEDICINE
Payer: MEDICARE

## 2019-09-19 DIAGNOSIS — E78.5 HYPERLIPIDEMIA, UNSPECIFIED HYPERLIPIDEMIA TYPE: ICD-10-CM

## 2019-09-19 LAB
ALBUMIN SERPL BCP-MCNC: 4.3 G/DL (ref 3.5–5.2)
ALP SERPL-CCNC: 70 U/L (ref 55–135)
ALT SERPL W/O P-5'-P-CCNC: 22 U/L (ref 10–44)
ANION GAP SERPL CALC-SCNC: 7 MMOL/L (ref 8–16)
AST SERPL-CCNC: 18 U/L (ref 10–40)
BILIRUB SERPL-MCNC: 0.6 MG/DL (ref 0.1–1)
BUN SERPL-MCNC: 23 MG/DL (ref 8–23)
CALCIUM SERPL-MCNC: 10.1 MG/DL (ref 8.7–10.5)
CHLORIDE SERPL-SCNC: 105 MMOL/L (ref 95–110)
CHOLEST SERPL-MCNC: 185 MG/DL (ref 120–199)
CHOLEST/HDLC SERPL: 5.1 {RATIO} (ref 2–5)
CO2 SERPL-SCNC: 27 MMOL/L (ref 23–29)
CREAT SERPL-MCNC: 1 MG/DL (ref 0.5–1.4)
EST. GFR  (AFRICAN AMERICAN): >60 ML/MIN/1.73 M^2
EST. GFR  (NON AFRICAN AMERICAN): >60 ML/MIN/1.73 M^2
GLUCOSE SERPL-MCNC: 89 MG/DL (ref 70–110)
HDLC SERPL-MCNC: 36 MG/DL (ref 40–75)
HDLC SERPL: 19.5 % (ref 20–50)
LDLC SERPL CALC-MCNC: 123.6 MG/DL (ref 63–159)
NONHDLC SERPL-MCNC: 149 MG/DL
POTASSIUM SERPL-SCNC: 4.3 MMOL/L (ref 3.5–5.1)
PROT SERPL-MCNC: 7.3 G/DL (ref 6–8.4)
SODIUM SERPL-SCNC: 139 MMOL/L (ref 136–145)
TRIGL SERPL-MCNC: 127 MG/DL (ref 30–150)

## 2019-09-19 PROCEDURE — 36415 COLL VENOUS BLD VENIPUNCTURE: CPT

## 2019-09-19 PROCEDURE — 80061 LIPID PANEL: CPT

## 2019-09-19 PROCEDURE — 80053 COMPREHEN METABOLIC PANEL: CPT

## 2019-09-26 ENCOUNTER — OFFICE VISIT (OUTPATIENT)
Dept: INTERNAL MEDICINE | Facility: CLINIC | Age: 69
End: 2019-09-26
Payer: MEDICARE

## 2019-09-26 VITALS
HEART RATE: 57 BPM | TEMPERATURE: 97 F | WEIGHT: 182.75 LBS | SYSTOLIC BLOOD PRESSURE: 90 MMHG | BODY MASS INDEX: 29.5 KG/M2 | DIASTOLIC BLOOD PRESSURE: 60 MMHG | OXYGEN SATURATION: 95 %

## 2019-09-26 DIAGNOSIS — I10 ESSENTIAL HYPERTENSION: Chronic | ICD-10-CM

## 2019-09-26 DIAGNOSIS — Z72.0 TOBACCO USE: Chronic | ICD-10-CM

## 2019-09-26 DIAGNOSIS — E78.5 HYPERLIPIDEMIA WITH TARGET LOW DENSITY LIPOPROTEIN (LDL) CHOLESTEROL LESS THAN 160 MG/DL: Chronic | ICD-10-CM

## 2019-09-26 PROCEDURE — 99214 PR OFFICE/OUTPT VISIT, EST, LEVL IV, 30-39 MIN: ICD-10-PCS | Mod: S$PBB,,, | Performed by: FAMILY MEDICINE

## 2019-09-26 PROCEDURE — 99999 PR PBB SHADOW E&M-EST. PATIENT-LVL III: ICD-10-PCS | Mod: PBBFAC,,, | Performed by: FAMILY MEDICINE

## 2019-09-26 PROCEDURE — 99213 OFFICE O/P EST LOW 20 MIN: CPT | Mod: PBBFAC,25 | Performed by: FAMILY MEDICINE

## 2019-09-26 PROCEDURE — 99999 PR PBB SHADOW E&M-EST. PATIENT-LVL III: CPT | Mod: PBBFAC,,, | Performed by: FAMILY MEDICINE

## 2019-09-26 PROCEDURE — 99214 OFFICE O/P EST MOD 30 MIN: CPT | Mod: S$PBB,,, | Performed by: FAMILY MEDICINE

## 2019-09-26 PROCEDURE — 90662 IIV NO PRSV INCREASED AG IM: CPT | Mod: PBBFAC

## 2019-10-04 NOTE — PROGRESS NOTES
Subjective:       Patient ID: Neymar Victoria is a 69 y.o. male.    Chief Complaint: Follow-up    Patient presents to clinic today for followup. He reports he is doing well and is without concerns today.    Review of Systems   Constitutional: Negative for activity change and unexpected weight change.   HENT: Negative for hearing loss, rhinorrhea and trouble swallowing.    Eyes: Negative for discharge and visual disturbance.   Respiratory: Negative for chest tightness and wheezing.    Cardiovascular: Negative for chest pain and palpitations.   Gastrointestinal: Negative for blood in stool, constipation, diarrhea and vomiting.   Endocrine: Negative for polydipsia and polyuria.   Genitourinary: Negative for difficulty urinating, hematuria and urgency.   Musculoskeletal: Negative for arthralgias, joint swelling and neck pain.   Neurological: Negative for weakness and headaches.   Psychiatric/Behavioral: Negative for confusion and dysphoric mood.       Objective:      Physical Exam   Constitutional: He is oriented to person, place, and time. He appears well-developed and well-nourished. No distress.   HENT:   Head: Normocephalic and atraumatic.   Eyes: Pupils are equal, round, and reactive to light. Conjunctivae and EOM are normal. No scleral icterus.   Cardiovascular: Normal rate and regular rhythm. Exam reveals no gallop and no friction rub.   No murmur heard.  Pulmonary/Chest: Effort normal and breath sounds normal.   Neurological: He is alert and oriented to person, place, and time. No cranial nerve deficit. Gait normal.   Psychiatric: He has a normal mood and affect.   Vitals reviewed.      Assessment:       1. Essential hypertension    2. Hyperlipidemia with target low density lipoprotein (LDL) cholesterol less than 160 mg/dL    3. Tobacco use        Plan:     Problem List Items Addressed This Visit     Essential hypertension (Chronic)    Current Assessment & Plan     Controlled, continue HCTZ, exforge and  metoprolol         Hyperlipidemia with target low density lipoprotein (LDL) cholesterol less than 160 mg/dL (Chronic)    Current Assessment & Plan     Stable         Tobacco use (Chronic)    Current Assessment & Plan     Encouraged cessation; declines smoking cessation program               Health Maintenance reviewed/updated. Flu vaccine today. Discussed shingrix.

## 2019-10-21 NOTE — PROGRESS NOTES
"Digital Medicine: Health  Follow-Up    Patient reports he is leaving Wednesday and "is not sure when he is coming back".   Patient reports it is hunting season.   Patient denies any other concerns at this time.           Follow Up  Follow-up reason(s): reading review      Patient is happy with where his readings are at.  Patient reports "he is going to try to keep it down".       INTERVENTION(S)  encouragement/support      There are no preventive care reminders to display for this patient.    Last 5 Patient Entered Readings                                      Current 30 Day Average: 122/71     Recent Readings 10/16/2019 10/8/2019 10/8/2019 10/2/2019 9/26/2019    SBP (mmHg) 119 126 134 127 120    DBP (mmHg) 70 75 75 66 70    Pulse 58 57 57 65 60                "

## 2020-01-06 ENCOUNTER — PATIENT OUTREACH (OUTPATIENT)
Dept: OTHER | Facility: OTHER | Age: 70
End: 2020-01-06

## 2020-01-17 ENCOUNTER — PATIENT MESSAGE (OUTPATIENT)
Dept: ADMINISTRATIVE | Facility: OTHER | Age: 70
End: 2020-01-17

## 2020-01-19 ENCOUNTER — PATIENT MESSAGE (OUTPATIENT)
Dept: ADMINISTRATIVE | Facility: OTHER | Age: 70
End: 2020-01-19

## 2020-02-01 DIAGNOSIS — I10 HYPERTENSION, ESSENTIAL, BENIGN: Chronic | ICD-10-CM

## 2020-02-01 DIAGNOSIS — I10 ESSENTIAL HYPERTENSION: ICD-10-CM

## 2020-02-01 RX ORDER — HYDROCHLOROTHIAZIDE 25 MG/1
TABLET ORAL
Qty: 90 TABLET | Refills: 1 | Status: SHIPPED | OUTPATIENT
Start: 2020-02-01 | End: 2020-07-28

## 2020-02-01 RX ORDER — METOPROLOL SUCCINATE 200 MG/1
TABLET, EXTENDED RELEASE ORAL
Qty: 90 TABLET | Refills: 1 | Status: SHIPPED | OUTPATIENT
Start: 2020-02-01 | End: 2020-07-28

## 2020-03-02 NOTE — PROGRESS NOTES
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  Patient reports he has been having a lot of dental work lately.         Follow Up  Follow-up reason(s): reading review      Patient reports his readings become elevated "when he doesn't eat like he supposed to".      INTERVENTION(S)  encouragement/support and denied further coaching    PLAN  continue monitoring      There are no preventive care reminders to display for this patient.    Last 5 Patient Entered Readings                                      Current 30 Day Average: 126/71     Recent Readings 2/28/2020 2/25/2020 2/25/2020 2/18/2020 2/10/2020    SBP (mmHg) 123 137 138 119 118    DBP (mmHg) 70 75 72 63 69    Pulse 62 60 59 63 60            Diet Screening   No change to diet.      Physical Activity Screening   No change to exercise routine.        "

## 2020-03-16 DIAGNOSIS — I10 ESSENTIAL HYPERTENSION: ICD-10-CM

## 2020-03-16 RX ORDER — AMLODIPINE AND VALSARTAN 10; 160 MG/1; MG/1
TABLET ORAL
Qty: 90 TABLET | Refills: 1 | Status: SHIPPED | OUTPATIENT
Start: 2020-03-16 | End: 2020-09-08

## 2020-04-27 ENCOUNTER — PATIENT MESSAGE (OUTPATIENT)
Dept: INTERNAL MEDICINE | Facility: CLINIC | Age: 70
End: 2020-04-27

## 2020-06-01 ENCOUNTER — LAB VISIT (OUTPATIENT)
Dept: LAB | Facility: HOSPITAL | Age: 70
End: 2020-06-01
Attending: FAMILY MEDICINE
Payer: MEDICARE

## 2020-06-01 DIAGNOSIS — Z13.29 THYROID DISORDER SCREEN: ICD-10-CM

## 2020-06-01 DIAGNOSIS — E78.5 HYPERLIPIDEMIA, UNSPECIFIED HYPERLIPIDEMIA TYPE: ICD-10-CM

## 2020-06-01 DIAGNOSIS — Z00.00 ROUTINE GENERAL MEDICAL EXAMINATION AT A HEALTH CARE FACILITY: ICD-10-CM

## 2020-06-01 LAB
ALBUMIN SERPL BCP-MCNC: 4.3 G/DL (ref 3.5–5.2)
ALP SERPL-CCNC: 69 U/L (ref 55–135)
ALT SERPL W/O P-5'-P-CCNC: 24 U/L (ref 10–44)
ANION GAP SERPL CALC-SCNC: 10 MMOL/L (ref 8–16)
AST SERPL-CCNC: 17 U/L (ref 10–40)
BASOPHILS # BLD AUTO: 0.05 K/UL (ref 0–0.2)
BASOPHILS NFR BLD: 0.5 % (ref 0–1.9)
BILIRUB SERPL-MCNC: 0.6 MG/DL (ref 0.1–1)
BUN SERPL-MCNC: 18 MG/DL (ref 8–23)
CALCIUM SERPL-MCNC: 10 MG/DL (ref 8.7–10.5)
CHLORIDE SERPL-SCNC: 103 MMOL/L (ref 95–110)
CHOLEST SERPL-MCNC: 194 MG/DL (ref 120–199)
CHOLEST/HDLC SERPL: 5.1 {RATIO} (ref 2–5)
CO2 SERPL-SCNC: 26 MMOL/L (ref 23–29)
CREAT SERPL-MCNC: 1 MG/DL (ref 0.5–1.4)
DIFFERENTIAL METHOD: ABNORMAL
EOSINOPHIL # BLD AUTO: 0.2 K/UL (ref 0–0.5)
EOSINOPHIL NFR BLD: 2.4 % (ref 0–8)
ERYTHROCYTE [DISTWIDTH] IN BLOOD BY AUTOMATED COUNT: 13.6 % (ref 11.5–14.5)
EST. GFR  (AFRICAN AMERICAN): >60 ML/MIN/1.73 M^2
EST. GFR  (NON AFRICAN AMERICAN): >60 ML/MIN/1.73 M^2
GLUCOSE SERPL-MCNC: 97 MG/DL (ref 70–110)
HCT VFR BLD AUTO: 49.3 % (ref 40–54)
HDLC SERPL-MCNC: 38 MG/DL (ref 40–75)
HDLC SERPL: 19.6 % (ref 20–50)
HGB BLD-MCNC: 15.5 G/DL (ref 14–18)
IMM GRANULOCYTES # BLD AUTO: 0.06 K/UL (ref 0–0.04)
IMM GRANULOCYTES NFR BLD AUTO: 0.6 % (ref 0–0.5)
LDLC SERPL CALC-MCNC: 125.2 MG/DL (ref 63–159)
LYMPHOCYTES # BLD AUTO: 2.3 K/UL (ref 1–4.8)
LYMPHOCYTES NFR BLD: 23.1 % (ref 18–48)
MCH RBC QN AUTO: 31 PG (ref 27–31)
MCHC RBC AUTO-ENTMCNC: 31.4 G/DL (ref 32–36)
MCV RBC AUTO: 99 FL (ref 82–98)
MONOCYTES # BLD AUTO: 0.9 K/UL (ref 0.3–1)
MONOCYTES NFR BLD: 9.1 % (ref 4–15)
NEUTROPHILS # BLD AUTO: 6.3 K/UL (ref 1.8–7.7)
NEUTROPHILS NFR BLD: 64.3 % (ref 38–73)
NONHDLC SERPL-MCNC: 156 MG/DL
NRBC BLD-RTO: 0 /100 WBC
PLATELET # BLD AUTO: 226 K/UL (ref 150–350)
PMV BLD AUTO: 10.5 FL (ref 9.2–12.9)
POTASSIUM SERPL-SCNC: 4.3 MMOL/L (ref 3.5–5.1)
PROT SERPL-MCNC: 7.4 G/DL (ref 6–8.4)
RBC # BLD AUTO: 5 M/UL (ref 4.6–6.2)
SODIUM SERPL-SCNC: 139 MMOL/L (ref 136–145)
TRIGL SERPL-MCNC: 154 MG/DL (ref 30–150)
TSH SERPL DL<=0.005 MIU/L-ACNC: 2.98 UIU/ML (ref 0.4–4)
WBC # BLD AUTO: 9.73 K/UL (ref 3.9–12.7)

## 2020-06-01 PROCEDURE — 84443 ASSAY THYROID STIM HORMONE: CPT

## 2020-06-01 PROCEDURE — 36415 COLL VENOUS BLD VENIPUNCTURE: CPT

## 2020-06-01 PROCEDURE — 80061 LIPID PANEL: CPT

## 2020-06-01 PROCEDURE — 80053 COMPREHEN METABOLIC PANEL: CPT

## 2020-06-01 PROCEDURE — 85025 COMPLETE CBC W/AUTO DIFF WBC: CPT

## 2020-06-08 ENCOUNTER — PATIENT OUTREACH (OUTPATIENT)
Dept: OTHER | Facility: OTHER | Age: 70
End: 2020-06-08

## 2020-06-08 NOTE — PROGRESS NOTES
Digital Medicine: Health  Follow-Up    The history is provided by the patient. No  was used.   Follow Up  Called patient to introduce myself as his new health .    Patient was excited and was looking forward to working with me.    He expressed that he had not taken his reading in about a week but is going to take another one soon.    Will follow up with the patient in couple weeks.      Intervention/Plan    There are no preventive care reminders to display for this patient.    Last 5 Patient Entered Readings                                      Current 30 Day Average: 119/66     Recent Readings 5/27/2020 5/27/2020 5/19/2020 5/11/2020 4/27/2020    SBP (mmHg) 111 109 127 118 122    DBP (mmHg) 62 64 73 64 69    Pulse 56 59 57 60 59                  Screenings    SDOH

## 2020-06-10 ENCOUNTER — OFFICE VISIT (OUTPATIENT)
Dept: INTERNAL MEDICINE | Facility: CLINIC | Age: 70
End: 2020-06-10
Payer: MEDICARE

## 2020-06-10 VITALS
HEART RATE: 73 BPM | OXYGEN SATURATION: 97 % | WEIGHT: 185.31 LBS | SYSTOLIC BLOOD PRESSURE: 148 MMHG | DIASTOLIC BLOOD PRESSURE: 80 MMHG | BODY MASS INDEX: 29.78 KG/M2 | HEIGHT: 66 IN | TEMPERATURE: 99 F

## 2020-06-10 DIAGNOSIS — E78.5 HYPERLIPIDEMIA WITH TARGET LOW DENSITY LIPOPROTEIN (LDL) CHOLESTEROL LESS THAN 160 MG/DL: Chronic | ICD-10-CM

## 2020-06-10 DIAGNOSIS — I10 ESSENTIAL HYPERTENSION: Chronic | ICD-10-CM

## 2020-06-10 DIAGNOSIS — Z72.0 TOBACCO USE: Chronic | ICD-10-CM

## 2020-06-10 PROCEDURE — 99999 PR PBB SHADOW E&M-EST. PATIENT-LVL III: CPT | Mod: PBBFAC,,, | Performed by: FAMILY MEDICINE

## 2020-06-10 PROCEDURE — 99214 PR OFFICE/OUTPT VISIT, EST, LEVL IV, 30-39 MIN: ICD-10-PCS | Mod: S$PBB,,, | Performed by: FAMILY MEDICINE

## 2020-06-10 PROCEDURE — 99999 PR PBB SHADOW E&M-EST. PATIENT-LVL III: ICD-10-PCS | Mod: PBBFAC,,, | Performed by: FAMILY MEDICINE

## 2020-06-10 PROCEDURE — 99213 OFFICE O/P EST LOW 20 MIN: CPT | Mod: PBBFAC | Performed by: FAMILY MEDICINE

## 2020-06-10 PROCEDURE — 99214 OFFICE O/P EST MOD 30 MIN: CPT | Mod: S$PBB,,, | Performed by: FAMILY MEDICINE

## 2020-06-10 NOTE — ASSESSMENT & PLAN NOTE
Controlled per digital hypertension program log; continue HCTZ, amlodipine-valsartan and metoprolol

## 2020-06-10 NOTE — PROGRESS NOTES
Subjective:       Patient ID: Neymar Victoria is a 69 y.o. male.    Chief Complaint: Annual Exam and Rash (back)    Patient presents to clinic today for annual physical exam. Rash on left back, resolving.    Review of Systems   Constitutional: Negative for activity change and unexpected weight change.   HENT: Negative for hearing loss, rhinorrhea and trouble swallowing.    Eyes: Negative for discharge and visual disturbance.   Respiratory: Negative for chest tightness and wheezing.    Cardiovascular: Negative for chest pain and palpitations.   Gastrointestinal: Negative for blood in stool, constipation, diarrhea and vomiting.   Endocrine: Negative for polydipsia and polyuria.   Genitourinary: Negative for difficulty urinating, hematuria and urgency.   Musculoskeletal: Negative for arthralgias, joint swelling and neck pain.   Neurological: Negative for weakness and headaches.   Psychiatric/Behavioral: Negative for confusion and dysphoric mood.       Objective:      Physical Exam   Constitutional: He is oriented to person, place, and time. He appears well-developed and well-nourished. No distress.   HENT:   Head: Normocephalic and atraumatic.   Right Ear: Hearing, tympanic membrane, external ear and ear canal normal.   Left Ear: Hearing, tympanic membrane, external ear and ear canal normal.   Nose: Nose normal.   Mouth/Throat: Uvula is midline, oropharynx is clear and moist and mucous membranes are normal.   Eyes: Pupils are equal, round, and reactive to light. Conjunctivae, EOM and lids are normal. No scleral icterus.   Neck: Normal range of motion. Neck supple. No thyromegaly present.   Cardiovascular: Normal rate and regular rhythm. Exam reveals no gallop and no friction rub.   No murmur heard.  Pulmonary/Chest: Effort normal and breath sounds normal. He has no wheezes. He has no rales.   Abdominal: Soft. Bowel sounds are normal. He exhibits no distension and no mass. There is no hepatosplenomegaly. There is no  tenderness.   Musculoskeletal: Normal range of motion. He exhibits no edema or tenderness.   Lymphadenopathy:     He has no cervical adenopathy.   Neurological: He is alert and oriented to person, place, and time. No cranial nerve deficit. Coordination normal.   Reflex Scores:       Patellar reflexes are 2+ on the right side and 2+ on the left side.  Skin: Skin is warm and dry. No rash noted.        Psychiatric: He has a normal mood and affect.   Vitals reviewed.      Assessment:       1. Essential hypertension    2. Hyperlipidemia with target low density lipoprotein (LDL) cholesterol less than 160 mg/dL    3. Tobacco use        Plan:     Problem List Items Addressed This Visit     Essential hypertension (Chronic)    Current Assessment & Plan     Controlled per digital hypertension program log; continue HCTZ, amlodipine-valsartan and metoprolol         Hyperlipidemia with target low density lipoprotein (LDL) cholesterol less than 160 mg/dL (Chronic)    Current Assessment & Plan     TG slightly elevated; no medications; watch diet/monitor         Tobacco use (Chronic)    Current Assessment & Plan     Encouraged cessation               Health Maintenance reviewed/updated.  Viewed [facility] video on prostate cancer screening risks/benefits, may consider in the future.

## 2020-06-15 ENCOUNTER — OFFICE VISIT (OUTPATIENT)
Dept: DERMATOLOGY | Facility: CLINIC | Age: 70
End: 2020-06-15
Payer: MEDICARE

## 2020-06-15 ENCOUNTER — PATIENT OUTREACH (OUTPATIENT)
Dept: ADMINISTRATIVE | Facility: OTHER | Age: 70
End: 2020-06-15

## 2020-06-15 DIAGNOSIS — L57.0 ACTINIC KERATOSES: ICD-10-CM

## 2020-06-15 DIAGNOSIS — Z80.8 FAMILY HISTORY OF MELANOMA: ICD-10-CM

## 2020-06-15 DIAGNOSIS — L82.1 SEBORRHEIC KERATOSIS: ICD-10-CM

## 2020-06-15 DIAGNOSIS — D22.9 MULTIPLE NEVI: Primary | ICD-10-CM

## 2020-06-15 PROCEDURE — 17003 DESTRUCT PREMALG LES 2-14: CPT | Mod: PBBFAC | Performed by: DERMATOLOGY

## 2020-06-15 PROCEDURE — 17000 DESTRUCT PREMALG LESION: CPT | Mod: S$PBB,,, | Performed by: DERMATOLOGY

## 2020-06-15 PROCEDURE — 17000 PR DESTRUCTION(LASER SURGERY,CRYOSURGERY,CHEMOSURGERY),PREMALIGNANT LESIONS,FIRST LESION: ICD-10-PCS | Mod: S$PBB,,, | Performed by: DERMATOLOGY

## 2020-06-15 PROCEDURE — 17003 DESTRUCT PREMALG LES 2-14: CPT | Mod: S$PBB,,, | Performed by: DERMATOLOGY

## 2020-06-15 PROCEDURE — 99999 PR PBB SHADOW E&M-EST. PATIENT-LVL III: ICD-10-PCS | Mod: PBBFAC,,, | Performed by: DERMATOLOGY

## 2020-06-15 PROCEDURE — 99213 PR OFFICE/OUTPT VISIT, EST, LEVL III, 20-29 MIN: ICD-10-PCS | Mod: 25,S$PBB,, | Performed by: DERMATOLOGY

## 2020-06-15 PROCEDURE — 99213 OFFICE O/P EST LOW 20 MIN: CPT | Mod: PBBFAC | Performed by: DERMATOLOGY

## 2020-06-15 PROCEDURE — 17000 DESTRUCT PREMALG LESION: CPT | Mod: PBBFAC | Performed by: DERMATOLOGY

## 2020-06-15 PROCEDURE — 99999 PR PBB SHADOW E&M-EST. PATIENT-LVL III: CPT | Mod: PBBFAC,,, | Performed by: DERMATOLOGY

## 2020-06-15 PROCEDURE — 17003 DESTRUCTION, PREMALIGNANT LESIONS; SECOND THROUGH 14 LESIONS: ICD-10-PCS | Mod: S$PBB,,, | Performed by: DERMATOLOGY

## 2020-06-15 PROCEDURE — 99213 OFFICE O/P EST LOW 20 MIN: CPT | Mod: 25,S$PBB,, | Performed by: DERMATOLOGY

## 2020-06-15 NOTE — PATIENT INSTRUCTIONS

## 2020-06-15 NOTE — PROGRESS NOTES
Subjective:       Patient ID:  Neymar Victoria is a 69 y.o. male who presents for   Chief Complaint   Patient presents with    Annual Exam     Hx of SK's, AK's, family history of melanoma (father), and angiomas, last seen on 2/11/19.      Denies bleeding, tender, growing, or concerning lesions.      The patient denies personal history of skin cancer. + family history of skin CA.       Review of Systems   Constitutional: Negative for fever and chills.   Gastrointestinal: Negative for nausea and vomiting.   Skin: Positive for activity-related sunscreen use. Negative for daily sunscreen use and recent sunburn.   Hematologic/Lymphatic: Does not bruise/bleed easily.        Objective:    Physical Exam   Constitutional: He appears well-developed and well-nourished. No distress.   Neurological: He is alert and oriented to person, place, and time. He is not disoriented.   Psychiatric: He has a normal mood and affect.   Skin:   Areas Examined (abnormalities noted in diagram):   Scalp / Hair Palpated and Inspected  Head / Face Inspection Performed  Neck Inspection Performed  Chest / Axilla Inspection Performed  Abdomen Inspection Performed  Back Inspection Performed  RUE Inspected  LUE Inspection Performed  RLE Inspected  LLE Inspection Performed  Nails and Digits Inspection Performed                       Diagram Legend     Erythematous scaling macule/papule c/w actinic keratosis       Vascular papule c/w angioma      Pigmented verrucoid papule/plaque c/w seborrheic keratosis      Yellow umbilicated papule c/w sebaceous hyperplasia      Irregularly shaped tan macule c/w lentigo     1-2 mm smooth white papules consistent with Milia      Movable subcutaneous cyst with punctum c/w epidermal inclusion cyst      Subcutaneous movable cyst c/w pilar cyst      Firm pink to brown papule c/w dermatofibroma      Pedunculated fleshy papule(s) c/w skin tag(s)      Evenly pigmented macule c/w junctional nevus     Mildly variegated  pigmented, slightly irregular-bordered macule c/w mildly atypical nevus      Flesh colored to evenly pigmented papule c/w intradermal nevus       Pink pearly papule/plaque c/w basal cell carcinoma      Erythematous hyperkeratotic cursted plaque c/w SCC      Surgical scar with no sign of skin cancer recurrence      Open and closed comedones      Inflammatory papules and pustules      Verrucoid papule consistent consistent with wart     Erythematous eczematous patches and plaques     Dystrophic onycholytic nail with subungual debris c/w onychomycosis     Umbilicated papule    Erythematous-base heme-crusted tan verrucoid plaque consistent with inflamed seborrheic keratosis     Erythematous Silvery Scaling Plaque c/w Psoriasis     See annotation      Assessment / Plan:        Multiple nevi  Family history of melanoma  Reassurance given.  Discussed ABCDEF of melanoma and changes for patient to look for.  AAD Handout given. Discussed importance of daily use of sunscreen which is broad-spectrum and has a minimum SPF of 30. Recommend yearly skin exam.     Seborrheic keratosis  Reassurance given. Discussed diagnosis and that lesions are benign.  AAD handout given.     Actinic keratoses  Cryosurgery Procedure Note    The patient is informed of the precancerous quality and need for treatment of these lesions. After risks, benefits and alternatives explained, including blistering, pain, hyper- and hypopigmentation, patient verbally consents to cryotherapy to precancerous lesions. Liquid nitrogen cryosurgery is applied to the 7 actinic keratoses, as detailed in the physical exam, to produce a freeze injury. The patient is aware that blisters may form and is instructed on wound care with gentle cleansing and use of vaseline ointment to keep moist until healed. The patient is supplied a handout on cryosurgery and is instructed to call if lesions do not completely resolve.             Follow up in about 1 year (around 6/15/2021).

## 2020-06-29 ENCOUNTER — PATIENT OUTREACH (OUTPATIENT)
Dept: OTHER | Facility: OTHER | Age: 70
End: 2020-06-29

## 2020-06-29 PROCEDURE — 99454 REM MNTR PHYSIOL PARAM 16-30: CPT | Mod: PBBFAC | Performed by: FAMILY MEDICINE

## 2020-06-29 NOTE — PROGRESS NOTES
Digital Medicine: Health  Follow-Up    The history is provided by the patient. No  was used.   Follow Up  Called patient for routine follow up.    Patient reports that overall he is doing well.    He reports that he exercises and works on his diet consistently. He denies any further questions or concerns at the current time.    He reports that everything is great and he feels fine.    Will follow up with the patient in a few weeks.      Intervention/Plan    There are no preventive care reminders to display for this patient.    Last 5 Patient Entered Readings                                      Current 30 Day Average: 133/74     Recent Readings 6/25/2020 6/23/2020 6/16/2020 6/10/2020 6/10/2020    SBP (mmHg) 125 129 133 134 136    DBP (mmHg) 73 71 69 75 74    Pulse 57 61 57 58 60                  Screenings    SDOH

## 2020-09-08 ENCOUNTER — TELEPHONE (OUTPATIENT)
Dept: INTERNAL MEDICINE | Facility: CLINIC | Age: 70
End: 2020-09-08

## 2020-09-08 NOTE — TELEPHONE ENCOUNTER
----- Message from Dulce Pratt sent at 9/7/2020 10:09 AM CDT -----  Regarding: flu shot  Appointment  Request      Who called:Patient  Reason for visit:flu shot  New or Existing Patient:existing  Callback or [place] response:callback  Best contact number:[phone]  Additional information:

## 2020-09-08 NOTE — TELEPHONE ENCOUNTER
Pt notified we are not give flu vaccines yet.   Pt voiced understanding to check back in the next few weeks.

## 2020-09-25 ENCOUNTER — PATIENT OUTREACH (OUTPATIENT)
Dept: ADMINISTRATIVE | Facility: OTHER | Age: 70
End: 2020-09-25

## 2020-09-25 NOTE — PROGRESS NOTES
Health Maintenance Due   Topic Date Due    Shingles Vaccine (1 of 2) 06/28/2002    Influenza Vaccine (1) 08/01/2020     Updates were requested from care everywhere.  Chart was reviewed for overdue Proactive [facility] Encounters ([facility]) topics (CRS, Breast Cancer Screening, Eye exam)  Health Maintenance has been updated.  LINKS immunization registry triggered.  Immunizations were not reconciled. LINKS not responding.

## 2020-09-28 ENCOUNTER — OFFICE VISIT (OUTPATIENT)
Dept: OPHTHALMOLOGY | Facility: CLINIC | Age: 70
End: 2020-09-28
Payer: MEDICARE

## 2020-09-28 DIAGNOSIS — H52.7 REFRACTION DISORDER: ICD-10-CM

## 2020-09-28 DIAGNOSIS — H35.3131 EARLY DRY STAGE NONEXUDATIVE AGE-RELATED MACULAR DEGENERATION OF BOTH EYES: ICD-10-CM

## 2020-09-28 DIAGNOSIS — H25.11 NUCLEAR SENILE CATARACT OF RIGHT EYE: Primary | ICD-10-CM

## 2020-09-28 DIAGNOSIS — H04.123 DRY EYE SYNDROME, BILATERAL: ICD-10-CM

## 2020-09-28 DIAGNOSIS — H25.12 NUCLEAR SENILE CATARACT OF LEFT EYE: ICD-10-CM

## 2020-09-28 PROCEDURE — 92015 PR REFRACTION: ICD-10-PCS | Mod: ,,, | Performed by: OPHTHALMOLOGY

## 2020-09-28 PROCEDURE — 92015 DETERMINE REFRACTIVE STATE: CPT | Mod: ,,, | Performed by: OPHTHALMOLOGY

## 2020-09-28 PROCEDURE — 92014 PR EYE EXAM, EST PATIENT,COMPREHESV: ICD-10-PCS | Mod: S$PBB,,, | Performed by: OPHTHALMOLOGY

## 2020-09-28 PROCEDURE — 99999 PR PBB SHADOW E&M-EST. PATIENT-LVL III: ICD-10-PCS | Mod: PBBFAC,,, | Performed by: OPHTHALMOLOGY

## 2020-09-28 PROCEDURE — 99999 PR PBB SHADOW E&M-EST. PATIENT-LVL III: CPT | Mod: PBBFAC,,, | Performed by: OPHTHALMOLOGY

## 2020-09-28 PROCEDURE — 92014 COMPRE OPH EXAM EST PT 1/>: CPT | Mod: S$PBB,,, | Performed by: OPHTHALMOLOGY

## 2020-09-28 PROCEDURE — 99213 OFFICE O/P EST LOW 20 MIN: CPT | Mod: PBBFAC | Performed by: OPHTHALMOLOGY

## 2020-09-28 NOTE — PROGRESS NOTES
HPI     Patient returns for his annual exam, patient denies any vision changes   since last visit.    Last edited by ANEUDY Rodriguez on 9/28/2020  8:37 AM. (History)            Assessment /Plan     For exam results, see Encounter Report.      ICD-10-CM ICD-9-CM    1. Nuclear senile cataract of right eye  H25.11 366.16 Will follow for now   2. Nuclear senile cataract of left eye  H25.12 366.16 As above   3. Dry eye syndrome, bilateral  H04.123 375.15 Mild -    4. Refraction disorder  H52.7 367.9        Discussed cataracts and decided to follow for now/   New glasses are not much different than the previous and this was discussed.   Return to clinic 1 year

## 2020-12-02 ENCOUNTER — LAB VISIT (OUTPATIENT)
Dept: LAB | Facility: HOSPITAL | Age: 70
End: 2020-12-02
Attending: FAMILY MEDICINE
Payer: MEDICARE

## 2020-12-02 DIAGNOSIS — E78.5 HYPERLIPIDEMIA, UNSPECIFIED HYPERLIPIDEMIA TYPE: ICD-10-CM

## 2020-12-02 LAB
ALBUMIN SERPL BCP-MCNC: 4.1 G/DL (ref 3.5–5.2)
ALP SERPL-CCNC: 73 U/L (ref 55–135)
ALT SERPL W/O P-5'-P-CCNC: 31 U/L (ref 10–44)
ANION GAP SERPL CALC-SCNC: 12 MMOL/L (ref 8–16)
AST SERPL-CCNC: 21 U/L (ref 10–40)
BILIRUB SERPL-MCNC: 0.6 MG/DL (ref 0.1–1)
BUN SERPL-MCNC: 22 MG/DL (ref 8–23)
CALCIUM SERPL-MCNC: 9.6 MG/DL (ref 8.7–10.5)
CHLORIDE SERPL-SCNC: 105 MMOL/L (ref 95–110)
CHOLEST SERPL-MCNC: 195 MG/DL (ref 120–199)
CHOLEST/HDLC SERPL: 5.4 {RATIO} (ref 2–5)
CO2 SERPL-SCNC: 26 MMOL/L (ref 23–29)
CREAT SERPL-MCNC: 1.1 MG/DL (ref 0.5–1.4)
EST. GFR  (AFRICAN AMERICAN): >60 ML/MIN/1.73 M^2
EST. GFR  (NON AFRICAN AMERICAN): >60 ML/MIN/1.73 M^2
GLUCOSE SERPL-MCNC: 97 MG/DL (ref 70–110)
HDLC SERPL-MCNC: 36 MG/DL (ref 40–75)
HDLC SERPL: 18.5 % (ref 20–50)
LDLC SERPL CALC-MCNC: 121.4 MG/DL (ref 63–159)
NONHDLC SERPL-MCNC: 159 MG/DL
POTASSIUM SERPL-SCNC: 4.6 MMOL/L (ref 3.5–5.1)
PROT SERPL-MCNC: 7.2 G/DL (ref 6–8.4)
SODIUM SERPL-SCNC: 143 MMOL/L (ref 136–145)
TRIGL SERPL-MCNC: 188 MG/DL (ref 30–150)

## 2020-12-02 PROCEDURE — 80061 LIPID PANEL: CPT

## 2020-12-02 PROCEDURE — 80053 COMPREHEN METABOLIC PANEL: CPT

## 2020-12-02 PROCEDURE — 36415 COLL VENOUS BLD VENIPUNCTURE: CPT

## 2020-12-09 ENCOUNTER — OFFICE VISIT (OUTPATIENT)
Dept: INTERNAL MEDICINE | Facility: CLINIC | Age: 70
End: 2020-12-09
Payer: MEDICARE

## 2020-12-09 VITALS
SYSTOLIC BLOOD PRESSURE: 138 MMHG | WEIGHT: 187.81 LBS | TEMPERATURE: 97 F | OXYGEN SATURATION: 95 % | DIASTOLIC BLOOD PRESSURE: 70 MMHG | BODY MASS INDEX: 30.18 KG/M2 | HEIGHT: 66 IN | HEART RATE: 68 BPM

## 2020-12-09 DIAGNOSIS — I10 ESSENTIAL HYPERTENSION: Primary | Chronic | ICD-10-CM

## 2020-12-09 DIAGNOSIS — E78.5 HYPERLIPIDEMIA WITH TARGET LOW DENSITY LIPOPROTEIN (LDL) CHOLESTEROL LESS THAN 160 MG/DL: Chronic | ICD-10-CM

## 2020-12-09 DIAGNOSIS — E53.8 VITAMIN B12 DEFICIENCY: ICD-10-CM

## 2020-12-09 DIAGNOSIS — Z72.0 TOBACCO USE: Chronic | ICD-10-CM

## 2020-12-09 PROCEDURE — 99999 PR PBB SHADOW E&M-EST. PATIENT-LVL III: ICD-10-PCS | Mod: PBBFAC,,, | Performed by: FAMILY MEDICINE

## 2020-12-09 PROCEDURE — G0008 ADMIN INFLUENZA VIRUS VAC: HCPCS | Mod: PBBFAC

## 2020-12-09 PROCEDURE — 99213 OFFICE O/P EST LOW 20 MIN: CPT | Mod: PBBFAC,25 | Performed by: FAMILY MEDICINE

## 2020-12-09 PROCEDURE — 99213 PR OFFICE/OUTPT VISIT, EST, LEVL III, 20-29 MIN: ICD-10-PCS | Mod: S$PBB,,, | Performed by: FAMILY MEDICINE

## 2020-12-09 PROCEDURE — 99213 OFFICE O/P EST LOW 20 MIN: CPT | Mod: S$PBB,,, | Performed by: FAMILY MEDICINE

## 2020-12-09 PROCEDURE — 90694 VACC AIIV4 NO PRSRV 0.5ML IM: CPT | Mod: PBBFAC

## 2020-12-09 PROCEDURE — 99999 PR PBB SHADOW E&M-EST. PATIENT-LVL III: CPT | Mod: PBBFAC,,, | Performed by: FAMILY MEDICINE

## 2020-12-09 NOTE — PROGRESS NOTES
Subjective:       Patient ID: Neymar Victoria is a 70 y.o. male.    Chief Complaint: Follow-up (6 month)    Patient presents to clinic today for followup of chronic conditions.  Hypertension controlled on amlodipine-valsartan, HCTZ and metoprolol.    Review of Systems   Constitutional: Negative for activity change and unexpected weight change.   HENT: Negative for hearing loss, rhinorrhea and trouble swallowing.    Eyes: Negative for discharge and visual disturbance.   Respiratory: Negative for chest tightness and wheezing.    Cardiovascular: Negative for chest pain and palpitations.   Gastrointestinal: Negative for blood in stool, constipation, diarrhea and vomiting.   Endocrine: Negative for polydipsia and polyuria.   Genitourinary: Negative for difficulty urinating, hematuria and urgency.   Musculoskeletal: Negative for arthralgias, joint swelling and neck pain.   Neurological: Negative for weakness and headaches.   Psychiatric/Behavioral: Negative for confusion and dysphoric mood.         Objective:      Physical Exam  Vitals signs reviewed.   Constitutional:       General: He is not in acute distress.     Appearance: He is well-developed.   HENT:      Head: Normocephalic and atraumatic.   Eyes:      General: Lids are normal. No scleral icterus.     Extraocular Movements: Extraocular movements intact.      Conjunctiva/sclera: Conjunctivae normal.      Pupils: Pupils are equal, round, and reactive to light.   Pulmonary:      Effort: Pulmonary effort is normal.   Neurological:      Mental Status: He is alert and oriented to person, place, and time.      Cranial Nerves: No cranial nerve deficit.      Gait: Gait normal.   Psychiatric:         Mood and Affect: Mood and affect normal.         Assessment:       1. Essential hypertension    2. Hyperlipidemia with target low density lipoprotein (LDL) cholesterol less than 160 mg/dL    3. Tobacco use    4. Vitamin B12 deficiency        Plan:     Problem List Items Addressed  This Visit     Essential hypertension - Primary (Chronic)    Relevant Orders    TSH    CBC Auto Differential    Hyperlipidemia with target low density lipoprotein (LDL) cholesterol less than 160 mg/dL (Chronic)    Relevant Orders    Comprehensive Metabolic Panel    Lipid Panel    Tobacco use (Chronic)    Vitamin B12 deficiency    Relevant Orders    Vitamin B12          Health Maintenance reviewed/updated. Flu vaccine today.  Discussed shingrix vaccine, given informational handout, advised can be obtained at pharmacy.  Patient viewed [facility] prostate cancer screening video last visit, he declines screening at this time.

## 2020-12-28 ENCOUNTER — HOSPITAL ENCOUNTER (OUTPATIENT)
Dept: RADIOLOGY | Facility: HOSPITAL | Age: 70
Discharge: HOME | End: 2020-12-28
Attending: NURSE PRACTITIONER
Payer: MEDICARE

## 2020-12-28 ENCOUNTER — OFFICE VISIT (OUTPATIENT)
Dept: INTERNAL MEDICINE | Facility: CLINIC | Age: 70
End: 2020-12-28
Payer: MEDICARE

## 2020-12-28 VITALS
HEIGHT: 66 IN | HEART RATE: 86 BPM | OXYGEN SATURATION: 96 % | WEIGHT: 185.06 LBS | SYSTOLIC BLOOD PRESSURE: 124 MMHG | TEMPERATURE: 98 F | DIASTOLIC BLOOD PRESSURE: 60 MMHG | BODY MASS INDEX: 29.74 KG/M2

## 2020-12-28 DIAGNOSIS — M25.571 ACUTE RIGHT ANKLE PAIN: ICD-10-CM

## 2020-12-28 DIAGNOSIS — Z72.0 TOBACCO USE: Chronic | ICD-10-CM

## 2020-12-28 DIAGNOSIS — M25.571 ACUTE RIGHT ANKLE PAIN: Primary | ICD-10-CM

## 2020-12-28 DIAGNOSIS — I10 ESSENTIAL HYPERTENSION: Chronic | ICD-10-CM

## 2020-12-28 PROCEDURE — 99999 PR PBB SHADOW E&M-EST. PATIENT-LVL V: CPT | Mod: PBBFAC,,, | Performed by: NURSE PRACTITIONER

## 2020-12-28 PROCEDURE — 99213 PR OFFICE/OUTPT VISIT, EST, LEVL III, 20-29 MIN: ICD-10-PCS | Mod: S$PBB,,, | Performed by: NURSE PRACTITIONER

## 2020-12-28 PROCEDURE — 99999 PR PBB SHADOW E&M-EST. PATIENT-LVL V: ICD-10-PCS | Mod: PBBFAC,,, | Performed by: NURSE PRACTITIONER

## 2020-12-28 PROCEDURE — 99215 OFFICE O/P EST HI 40 MIN: CPT | Mod: PBBFAC,25 | Performed by: NURSE PRACTITIONER

## 2020-12-28 PROCEDURE — 73610 X-RAY EXAM OF ANKLE: CPT | Mod: 26,RT,, | Performed by: RADIOLOGY

## 2020-12-28 PROCEDURE — 73610 X-RAY EXAM OF ANKLE: CPT | Mod: TC,RT

## 2020-12-28 PROCEDURE — 73610 XR ANKLE COMPLETE 3 VIEW RIGHT: ICD-10-PCS | Mod: 26,RT,, | Performed by: RADIOLOGY

## 2020-12-28 PROCEDURE — 99213 OFFICE O/P EST LOW 20 MIN: CPT | Mod: S$PBB,,, | Performed by: NURSE PRACTITIONER

## 2020-12-29 ENCOUNTER — PES CALL (OUTPATIENT)
Dept: ADMINISTRATIVE | Facility: CLINIC | Age: 70
End: 2020-12-29

## 2021-01-11 ENCOUNTER — OFFICE VISIT (OUTPATIENT)
Dept: PODIATRY | Facility: CLINIC | Age: 71
End: 2021-01-11
Payer: MEDICARE

## 2021-01-11 ENCOUNTER — LAB VISIT (OUTPATIENT)
Dept: LAB | Facility: HOSPITAL | Age: 71
End: 2021-01-11
Attending: PODIATRIST
Payer: MEDICARE

## 2021-01-11 VITALS
HEIGHT: 66 IN | HEART RATE: 64 BPM | BODY MASS INDEX: 30.29 KG/M2 | WEIGHT: 188.5 LBS | SYSTOLIC BLOOD PRESSURE: 133 MMHG | DIASTOLIC BLOOD PRESSURE: 71 MMHG

## 2021-01-11 DIAGNOSIS — M10.9 ACUTE GOUT OF RIGHT ANKLE, UNSPECIFIED CAUSE: ICD-10-CM

## 2021-01-11 DIAGNOSIS — M76.821 POSTERIOR TIBIAL TENDINITIS, RIGHT: Primary | ICD-10-CM

## 2021-01-11 DIAGNOSIS — I10 ESSENTIAL HYPERTENSION: Chronic | ICD-10-CM

## 2021-01-11 DIAGNOSIS — Z72.0 TOBACCO USE: Chronic | ICD-10-CM

## 2021-01-11 DIAGNOSIS — M25.571 ACUTE RIGHT ANKLE PAIN: ICD-10-CM

## 2021-01-11 LAB — URATE SERPL-MCNC: 10.1 MG/DL (ref 3.4–7)

## 2021-01-11 PROCEDURE — 36415 COLL VENOUS BLD VENIPUNCTURE: CPT

## 2021-01-11 PROCEDURE — 99214 OFFICE O/P EST MOD 30 MIN: CPT | Mod: S$PBB,,, | Performed by: PODIATRIST

## 2021-01-11 PROCEDURE — 99999 PR PBB SHADOW E&M-EST. PATIENT-LVL III: ICD-10-PCS | Mod: PBBFAC,,, | Performed by: PODIATRIST

## 2021-01-11 PROCEDURE — 99999 PR PBB SHADOW E&M-EST. PATIENT-LVL III: CPT | Mod: PBBFAC,,, | Performed by: PODIATRIST

## 2021-01-11 PROCEDURE — 99213 OFFICE O/P EST LOW 20 MIN: CPT | Mod: PBBFAC | Performed by: PODIATRIST

## 2021-01-11 PROCEDURE — 99214 PR OFFICE/OUTPT VISIT, EST, LEVL IV, 30-39 MIN: ICD-10-PCS | Mod: S$PBB,,, | Performed by: PODIATRIST

## 2021-01-11 PROCEDURE — 84550 ASSAY OF BLOOD/URIC ACID: CPT

## 2021-01-11 RX ORDER — NAPROXEN 500 MG/1
500 TABLET ORAL 2 TIMES DAILY WITH MEALS
Qty: 60 TABLET | Refills: 0 | Status: SHIPPED | OUTPATIENT
Start: 2021-01-11 | End: 2021-02-03

## 2021-01-12 DIAGNOSIS — M10.271 ACUTE DRUG-INDUCED GOUT OF RIGHT ANKLE: Primary | ICD-10-CM

## 2021-01-12 RX ORDER — ALLOPURINOL 300 MG/1
300 TABLET ORAL DAILY
Qty: 30 TABLET | Refills: 0 | Status: SHIPPED | OUTPATIENT
Start: 2021-01-12 | End: 2021-02-03

## 2021-01-25 ENCOUNTER — OFFICE VISIT (OUTPATIENT)
Dept: PODIATRY | Facility: CLINIC | Age: 71
End: 2021-01-25
Payer: MEDICARE

## 2021-01-25 VITALS
DIASTOLIC BLOOD PRESSURE: 69 MMHG | WEIGHT: 187.63 LBS | HEART RATE: 61 BPM | HEIGHT: 66 IN | BODY MASS INDEX: 30.16 KG/M2 | SYSTOLIC BLOOD PRESSURE: 132 MMHG

## 2021-01-25 DIAGNOSIS — M1A.0710 CHRONIC GOUT OF RIGHT ANKLE, UNSPECIFIED CAUSE: Primary | ICD-10-CM

## 2021-01-25 DIAGNOSIS — M25.571 PAIN IN RIGHT ANKLE AND JOINTS OF RIGHT FOOT: ICD-10-CM

## 2021-01-25 DIAGNOSIS — Z72.0 TOBACCO USE: ICD-10-CM

## 2021-01-25 PROCEDURE — 99214 PR OFFICE/OUTPT VISIT, EST, LEVL IV, 30-39 MIN: ICD-10-PCS | Mod: S$PBB,,, | Performed by: PODIATRIST

## 2021-01-25 PROCEDURE — 99999 PR PBB SHADOW E&M-EST. PATIENT-LVL III: CPT | Mod: PBBFAC,,, | Performed by: PODIATRIST

## 2021-01-25 PROCEDURE — 99213 OFFICE O/P EST LOW 20 MIN: CPT | Mod: PBBFAC | Performed by: PODIATRIST

## 2021-01-25 PROCEDURE — 99214 OFFICE O/P EST MOD 30 MIN: CPT | Mod: S$PBB,,, | Performed by: PODIATRIST

## 2021-01-25 PROCEDURE — 99999 PR PBB SHADOW E&M-EST. PATIENT-LVL III: ICD-10-PCS | Mod: PBBFAC,,, | Performed by: PODIATRIST

## 2021-02-11 ENCOUNTER — PATIENT MESSAGE (OUTPATIENT)
Dept: ADMINISTRATIVE | Facility: OTHER | Age: 71
End: 2021-02-11

## 2021-02-11 ENCOUNTER — LAB VISIT (OUTPATIENT)
Dept: LAB | Facility: HOSPITAL | Age: 71
End: 2021-02-11
Attending: PODIATRIST
Payer: MEDICARE

## 2021-02-11 DIAGNOSIS — M1A.0710 CHRONIC GOUT OF RIGHT ANKLE, UNSPECIFIED CAUSE: ICD-10-CM

## 2021-02-11 LAB — URATE SERPL-MCNC: 5.5 MG/DL (ref 3.4–7)

## 2021-02-11 PROCEDURE — 84550 ASSAY OF BLOOD/URIC ACID: CPT

## 2021-02-11 PROCEDURE — 36415 COLL VENOUS BLD VENIPUNCTURE: CPT

## 2021-02-26 ENCOUNTER — PES CALL (OUTPATIENT)
Dept: ADMINISTRATIVE | Facility: CLINIC | Age: 71
End: 2021-02-26

## 2021-03-01 ENCOUNTER — PES CALL (OUTPATIENT)
Dept: ADMINISTRATIVE | Facility: CLINIC | Age: 71
End: 2021-03-01

## 2021-03-02 ENCOUNTER — PES CALL (OUTPATIENT)
Dept: ADMINISTRATIVE | Facility: CLINIC | Age: 71
End: 2021-03-02

## 2021-03-18 ENCOUNTER — TELEPHONE (OUTPATIENT)
Dept: ADMINISTRATIVE | Facility: CLINIC | Age: 71
End: 2021-03-18

## 2021-03-23 ENCOUNTER — PATIENT OUTREACH (OUTPATIENT)
Dept: ADMINISTRATIVE | Facility: HOSPITAL | Age: 71
End: 2021-03-23

## 2021-04-13 ENCOUNTER — TELEPHONE (OUTPATIENT)
Dept: INTERNAL MEDICINE | Facility: CLINIC | Age: 71
End: 2021-04-13

## 2021-04-13 ENCOUNTER — TELEPHONE (OUTPATIENT)
Dept: OPHTHALMOLOGY | Facility: CLINIC | Age: 71
End: 2021-04-13

## 2021-04-15 ENCOUNTER — TELEPHONE (OUTPATIENT)
Dept: ADMINISTRATIVE | Facility: HOSPITAL | Age: 71
End: 2021-04-15

## 2021-05-03 ENCOUNTER — TELEPHONE (OUTPATIENT)
Dept: ADMINISTRATIVE | Facility: HOSPITAL | Age: 71
End: 2021-05-03

## 2021-05-12 ENCOUNTER — TELEPHONE (OUTPATIENT)
Dept: ADMINISTRATIVE | Facility: HOSPITAL | Age: 71
End: 2021-05-12

## 2021-05-19 ENCOUNTER — OFFICE VISIT (OUTPATIENT)
Dept: OPHTHALMOLOGY | Facility: CLINIC | Age: 71
End: 2021-05-19
Payer: MEDICARE

## 2021-05-19 DIAGNOSIS — H25.11 NUCLEAR SENILE CATARACT OF RIGHT EYE: Primary | ICD-10-CM

## 2021-05-19 DIAGNOSIS — H04.123 DRY EYE SYNDROME, BILATERAL: ICD-10-CM

## 2021-05-19 DIAGNOSIS — H02.825 CYST OF LEFT LOWER EYELID: ICD-10-CM

## 2021-05-19 DIAGNOSIS — H35.3131 EARLY DRY STAGE NONEXUDATIVE AGE-RELATED MACULAR DEGENERATION OF BOTH EYES: ICD-10-CM

## 2021-05-19 DIAGNOSIS — H25.12 NUCLEAR SENILE CATARACT OF LEFT EYE: ICD-10-CM

## 2021-05-19 PROCEDURE — 92136 IOL MASTER - OD - RIGHT EYE: ICD-10-PCS | Mod: 26,S$PBB,RT, | Performed by: OPHTHALMOLOGY

## 2021-05-19 PROCEDURE — 99213 PR OFFICE/OUTPT VISIT, EST, LEVL III, 20-29 MIN: ICD-10-PCS | Mod: S$PBB,,, | Performed by: OPHTHALMOLOGY

## 2021-05-19 PROCEDURE — 99999 PR PBB SHADOW E&M-EST. PATIENT-LVL III: CPT | Mod: PBBFAC,,, | Performed by: OPHTHALMOLOGY

## 2021-05-19 PROCEDURE — 99213 OFFICE O/P EST LOW 20 MIN: CPT | Mod: S$PBB,,, | Performed by: OPHTHALMOLOGY

## 2021-05-19 PROCEDURE — 99999 PR PBB SHADOW E&M-EST. PATIENT-LVL III: ICD-10-PCS | Mod: PBBFAC,,, | Performed by: OPHTHALMOLOGY

## 2021-05-19 PROCEDURE — 99213 OFFICE O/P EST LOW 20 MIN: CPT | Mod: PBBFAC,25 | Performed by: OPHTHALMOLOGY

## 2021-05-19 PROCEDURE — 92136 OPHTHALMIC BIOMETRY: CPT | Mod: PBBFAC,RT | Performed by: OPHTHALMOLOGY

## 2021-05-19 RX ORDER — DUREZOL 0.5 MG/ML
1 EMULSION OPHTHALMIC 4 TIMES DAILY
Qty: 1 BOTTLE | Refills: 1 | Status: SHIPPED | OUTPATIENT
Start: 2021-05-19 | End: 2021-06-18

## 2021-05-21 ENCOUNTER — OFFICE VISIT (OUTPATIENT)
Dept: OPHTHALMOLOGY | Facility: CLINIC | Age: 71
End: 2021-05-21
Payer: MEDICARE

## 2021-05-21 DIAGNOSIS — H25.12 NUCLEAR SENILE CATARACT OF LEFT EYE: ICD-10-CM

## 2021-05-21 DIAGNOSIS — H25.11 NUCLEAR SENILE CATARACT OF RIGHT EYE: Primary | ICD-10-CM

## 2021-05-21 PROCEDURE — 99999 PR PBB SHADOW E&M-EST. PATIENT-LVL I: CPT | Mod: PBBFAC,,, | Performed by: OPHTHALMOLOGY

## 2021-05-21 PROCEDURE — 99999 PR PBB SHADOW E&M-EST. PATIENT-LVL I: ICD-10-PCS | Mod: PBBFAC,,, | Performed by: OPHTHALMOLOGY

## 2021-05-21 PROCEDURE — 99211 OFF/OP EST MAY X REQ PHY/QHP: CPT | Mod: PBBFAC | Performed by: OPHTHALMOLOGY

## 2021-05-21 PROCEDURE — 99499 NO LOS: ICD-10-PCS | Mod: S$PBB,,, | Performed by: OPHTHALMOLOGY

## 2021-05-21 PROCEDURE — 99499 UNLISTED E&M SERVICE: CPT | Mod: S$PBB,,, | Performed by: OPHTHALMOLOGY

## 2021-05-30 DIAGNOSIS — I10 ESSENTIAL HYPERTENSION: ICD-10-CM

## 2021-06-01 ENCOUNTER — TELEPHONE (OUTPATIENT)
Dept: OPHTHALMOLOGY | Facility: CLINIC | Age: 71
End: 2021-06-01

## 2021-06-03 DIAGNOSIS — I10 ESSENTIAL HYPERTENSION: ICD-10-CM

## 2021-06-03 RX ORDER — AMLODIPINE AND VALSARTAN 10; 160 MG/1; MG/1
1 TABLET ORAL DAILY
Qty: 90 TABLET | Refills: 2 | OUTPATIENT
Start: 2021-06-03

## 2021-06-03 RX ORDER — AMLODIPINE AND VALSARTAN 10; 160 MG/1; MG/1
TABLET ORAL
Qty: 90 TABLET | Refills: 1 | Status: SHIPPED | OUTPATIENT
Start: 2021-06-03 | End: 2021-12-06

## 2021-06-09 ENCOUNTER — LAB VISIT (OUTPATIENT)
Dept: LAB | Facility: HOSPITAL | Age: 71
End: 2021-06-09
Attending: FAMILY MEDICINE
Payer: MEDICARE

## 2021-06-09 ENCOUNTER — OFFICE VISIT (OUTPATIENT)
Dept: INTERNAL MEDICINE | Facility: CLINIC | Age: 71
End: 2021-06-09
Payer: MEDICARE

## 2021-06-09 VITALS
DIASTOLIC BLOOD PRESSURE: 64 MMHG | BODY MASS INDEX: 29.62 KG/M2 | HEIGHT: 66 IN | HEART RATE: 57 BPM | WEIGHT: 184.31 LBS | OXYGEN SATURATION: 97 % | SYSTOLIC BLOOD PRESSURE: 130 MMHG

## 2021-06-09 DIAGNOSIS — H91.90 HEARING DIFFICULTY, UNSPECIFIED LATERALITY: ICD-10-CM

## 2021-06-09 DIAGNOSIS — I70.8 AORTO-ILIAC ATHEROSCLEROSIS: ICD-10-CM

## 2021-06-09 DIAGNOSIS — I70.0 AORTO-ILIAC ATHEROSCLEROSIS: ICD-10-CM

## 2021-06-09 DIAGNOSIS — I10 ESSENTIAL HYPERTENSION: Chronic | ICD-10-CM

## 2021-06-09 DIAGNOSIS — E53.8 VITAMIN B12 DEFICIENCY: ICD-10-CM

## 2021-06-09 DIAGNOSIS — E78.5 HYPERLIPIDEMIA, UNSPECIFIED HYPERLIPIDEMIA TYPE: ICD-10-CM

## 2021-06-09 DIAGNOSIS — H35.3131 EARLY DRY STAGE NONEXUDATIVE AGE-RELATED MACULAR DEGENERATION OF BOTH EYES: ICD-10-CM

## 2021-06-09 DIAGNOSIS — Z72.0 TOBACCO USE: ICD-10-CM

## 2021-06-09 DIAGNOSIS — E78.5 HYPERLIPIDEMIA WITH TARGET LOW DENSITY LIPOPROTEIN (LDL) CHOLESTEROL LESS THAN 160 MG/DL: Chronic | ICD-10-CM

## 2021-06-09 DIAGNOSIS — R94.120 ABNORMAL HEARING SCREEN: ICD-10-CM

## 2021-06-09 DIAGNOSIS — Z91.89 POTENTIAL FOR COGNITIVE IMPAIRMENT: ICD-10-CM

## 2021-06-09 DIAGNOSIS — R20.8 BURNING SENSATION: ICD-10-CM

## 2021-06-09 DIAGNOSIS — Z00.00 ENCOUNTER FOR PREVENTIVE HEALTH EXAMINATION: Primary | ICD-10-CM

## 2021-06-09 DIAGNOSIS — R09.89 DECREASED DORSALIS PEDIS PULSE: ICD-10-CM

## 2021-06-09 LAB
ALBUMIN SERPL BCP-MCNC: 4.4 G/DL (ref 3.5–5.2)
ALP SERPL-CCNC: 71 U/L (ref 55–135)
ALT SERPL W/O P-5'-P-CCNC: 25 U/L (ref 10–44)
ANION GAP SERPL CALC-SCNC: 12 MMOL/L (ref 8–16)
AST SERPL-CCNC: 19 U/L (ref 10–40)
BASOPHILS # BLD AUTO: 0.06 K/UL (ref 0–0.2)
BASOPHILS NFR BLD: 0.5 % (ref 0–1.9)
BILIRUB SERPL-MCNC: 0.8 MG/DL (ref 0.1–1)
BUN SERPL-MCNC: 21 MG/DL (ref 8–23)
CALCIUM SERPL-MCNC: 10.9 MG/DL (ref 8.7–10.5)
CHLORIDE SERPL-SCNC: 106 MMOL/L (ref 95–110)
CHOLEST SERPL-MCNC: 188 MG/DL (ref 120–199)
CHOLEST/HDLC SERPL: 5.4 {RATIO} (ref 2–5)
CO2 SERPL-SCNC: 26 MMOL/L (ref 23–29)
CREAT SERPL-MCNC: 1.1 MG/DL (ref 0.5–1.4)
DIFFERENTIAL METHOD: ABNORMAL
EOSINOPHIL # BLD AUTO: 0.2 K/UL (ref 0–0.5)
EOSINOPHIL NFR BLD: 1.5 % (ref 0–8)
ERYTHROCYTE [DISTWIDTH] IN BLOOD BY AUTOMATED COUNT: 13.6 % (ref 11.5–14.5)
EST. GFR  (AFRICAN AMERICAN): >60 ML/MIN/1.73 M^2
EST. GFR  (NON AFRICAN AMERICAN): >60 ML/MIN/1.73 M^2
GLUCOSE SERPL-MCNC: 96 MG/DL (ref 70–110)
HCT VFR BLD AUTO: 51.7 % (ref 40–54)
HDLC SERPL-MCNC: 35 MG/DL (ref 40–75)
HDLC SERPL: 18.6 % (ref 20–50)
HGB BLD-MCNC: 16.7 G/DL (ref 14–18)
IMM GRANULOCYTES # BLD AUTO: 0.04 K/UL (ref 0–0.04)
IMM GRANULOCYTES NFR BLD AUTO: 0.4 % (ref 0–0.5)
LDLC SERPL CALC-MCNC: 121.4 MG/DL (ref 63–159)
LYMPHOCYTES # BLD AUTO: 2.4 K/UL (ref 1–4.8)
LYMPHOCYTES NFR BLD: 20.7 % (ref 18–48)
MCH RBC QN AUTO: 31.8 PG (ref 27–31)
MCHC RBC AUTO-ENTMCNC: 32.3 G/DL (ref 32–36)
MCV RBC AUTO: 99 FL (ref 82–98)
MONOCYTES # BLD AUTO: 0.9 K/UL (ref 0.3–1)
MONOCYTES NFR BLD: 7.7 % (ref 4–15)
NEUTROPHILS # BLD AUTO: 7.9 K/UL (ref 1.8–7.7)
NEUTROPHILS NFR BLD: 69.2 % (ref 38–73)
NONHDLC SERPL-MCNC: 153 MG/DL
NRBC BLD-RTO: 0 /100 WBC
PLATELET # BLD AUTO: 245 K/UL (ref 150–450)
PMV BLD AUTO: 10.2 FL (ref 9.2–12.9)
POTASSIUM SERPL-SCNC: 4.4 MMOL/L (ref 3.5–5.1)
PROT SERPL-MCNC: 7.6 G/DL (ref 6–8.4)
RBC # BLD AUTO: 5.25 M/UL (ref 4.6–6.2)
SODIUM SERPL-SCNC: 144 MMOL/L (ref 136–145)
TRIGL SERPL-MCNC: 158 MG/DL (ref 30–150)
TSH SERPL DL<=0.005 MIU/L-ACNC: 3.15 UIU/ML (ref 0.4–4)
VIT B12 SERPL-MCNC: 1040 PG/ML (ref 210–950)
WBC # BLD AUTO: 11.36 K/UL (ref 3.9–12.7)

## 2021-06-09 PROCEDURE — 36415 COLL VENOUS BLD VENIPUNCTURE: CPT | Performed by: FAMILY MEDICINE

## 2021-06-09 PROCEDURE — 99215 OFFICE O/P EST HI 40 MIN: CPT | Mod: PBBFAC | Performed by: NURSE PRACTITIONER

## 2021-06-09 PROCEDURE — 80061 LIPID PANEL: CPT | Performed by: FAMILY MEDICINE

## 2021-06-09 PROCEDURE — G0439 PR MEDICARE ANNUAL WELLNESS SUBSEQUENT VISIT: ICD-10-PCS | Mod: ,,, | Performed by: NURSE PRACTITIONER

## 2021-06-09 PROCEDURE — 80053 COMPREHEN METABOLIC PANEL: CPT | Performed by: FAMILY MEDICINE

## 2021-06-09 PROCEDURE — 99999 PR PBB SHADOW E&M-EST. PATIENT-LVL V: ICD-10-PCS | Mod: PBBFAC,,, | Performed by: NURSE PRACTITIONER

## 2021-06-09 PROCEDURE — 82607 VITAMIN B-12: CPT | Performed by: FAMILY MEDICINE

## 2021-06-09 PROCEDURE — 99999 PR PBB SHADOW E&M-EST. PATIENT-LVL V: CPT | Mod: PBBFAC,,, | Performed by: NURSE PRACTITIONER

## 2021-06-09 PROCEDURE — 84443 ASSAY THYROID STIM HORMONE: CPT | Performed by: FAMILY MEDICINE

## 2021-06-09 PROCEDURE — 85025 COMPLETE CBC W/AUTO DIFF WBC: CPT | Performed by: FAMILY MEDICINE

## 2021-06-09 PROCEDURE — G0439 PPPS, SUBSEQ VISIT: HCPCS | Mod: ,,, | Performed by: NURSE PRACTITIONER

## 2021-06-10 ENCOUNTER — OUTSIDE PLACE OF SERVICE (OUTPATIENT)
Dept: OPHTHALMOLOGY | Facility: CLINIC | Age: 71
End: 2021-06-10
Payer: MEDICARE

## 2021-06-10 PROCEDURE — 66984 XCAPSL CTRC RMVL W/O ECP: CPT | Mod: RT,,, | Performed by: OPHTHALMOLOGY

## 2021-06-10 PROCEDURE — 66984 PR REMOVAL, CATARACT, W/INSRT INTRAOC LENS, W/O ENDO CYCLO: ICD-10-PCS | Mod: RT,,, | Performed by: OPHTHALMOLOGY

## 2021-06-11 ENCOUNTER — OFFICE VISIT (OUTPATIENT)
Dept: OPHTHALMOLOGY | Facility: CLINIC | Age: 71
End: 2021-06-11
Payer: MEDICARE

## 2021-06-11 DIAGNOSIS — H25.12 NUCLEAR SENILE CATARACT OF LEFT EYE: ICD-10-CM

## 2021-06-11 DIAGNOSIS — H04.123 DRY EYE SYNDROME, BILATERAL: ICD-10-CM

## 2021-06-11 DIAGNOSIS — Z98.41 CATARACT EXTRACTION STATUS, RIGHT: Primary | ICD-10-CM

## 2021-06-11 PROCEDURE — 99499 UNLISTED E&M SERVICE: CPT | Mod: CSM,S$PBB,, | Performed by: OPHTHALMOLOGY

## 2021-06-11 PROCEDURE — 99024 POSTOP FOLLOW-UP VISIT: CPT | Mod: POP,,, | Performed by: OPHTHALMOLOGY

## 2021-06-11 PROCEDURE — 99024 PR POST-OP FOLLOW-UP VISIT: ICD-10-PCS | Mod: POP,,, | Performed by: OPHTHALMOLOGY

## 2021-06-11 PROCEDURE — 99499 PR MULTI-FOCAL IOL EVAL: ICD-10-PCS | Mod: CSM,S$PBB,, | Performed by: OPHTHALMOLOGY

## 2021-06-11 PROCEDURE — 99213 OFFICE O/P EST LOW 20 MIN: CPT | Mod: PBBFAC | Performed by: OPHTHALMOLOGY

## 2021-06-11 PROCEDURE — 99999 PR PBB SHADOW E&M-EST. PATIENT-LVL III: CPT | Mod: PBBFAC,,, | Performed by: OPHTHALMOLOGY

## 2021-06-11 PROCEDURE — 99999 PR PBB SHADOW E&M-EST. PATIENT-LVL III: ICD-10-PCS | Mod: PBBFAC,,, | Performed by: OPHTHALMOLOGY

## 2021-06-18 NOTE — PROGRESS NOTES
"CHART NOTE     DATE OF SERVICE:  2018     : 1949   69 y.o.     ASSESSMENT :   Doing well with improvement in symptoms and function.       PLAN: Wean from collar/brace. Loosen restrictions. Refer to PT. RTC prn. Enc to call with any new questions or concerns.     HPI:  Amanda Sandoval is status post LEFT LUMBAR 4 NERVE ROOT DECOMPRESSION on 18 by Dr. Carranza. Presented with  left lateral thigh pain with \"prickling\" and numbness to the knee.    TODAY, Amanda Sandoval reports pain is somewhat gone, still w/residual numbness and burning down to ankle now.  Leg feels weak, tires easily going up and down stairs. Lives in [place] would like PT there.      PAST MEDICAL HISTORY, SURGICAL HISTORY, REVIEW OF SYMPTOMS, MEDICATIONS AND ALLERGIES:  Past medical history, surgical history, ROS, medications and allergies reviewed with patient and remain unchanged from previous visit.    Past Medical History:   Diagnosis Date     Back pain      Hypertension      Numbness and tingling      Sciatica      Sciatica        PHYSICAL EXAM:    /66  Pulse 72  Resp 18    Neurological exam reveals:  Respirations easy, non-labored.   Skin: W/D/I. No rashes, lesions or breaks in integrity.   Recent and remote memory intact, fund of knowledge wnl.    Alert and oriented x3, speech fluent and appropriate.   PERRL, EOMI, No nystagmus,   Face symmetric, tongue midline, Uvula midline,  palate rises with phonation   Shoulder shrug equal  Leg strength bilateral dorsiflexion, plantar flexion, and hip flexion 5/5  No extremity edema noted.   Can heel/toe walk, do toe rises and squats without difficulty.   Muscle Bulk and tone wnl.   Reflexes: No pathological reflexes   Gait and station:Normal  Incision: CDI without erythema or edema  NDI/BRENDA: 18%   RADIOGRAPHIC IMAGING:  NA        " Last 5 Patient Entered Readings                                      Current 30 Day Average: 127/70     Recent Readings 3/23/2019 3/23/2019 3/15/2019 3/11/2019 3/5/2019    SBP (mmHg) 125 134 123 128 130    DBP (mmHg) 74 68 71 67 70    Pulse 59 60 65 59 62        3/25: Called patient to introduce myself as new health .  Patient was grateful for the call.   Patient denies needing help with anything at this time.   BP is controlled.    Patient is currently at goal, 127/70 mmHg does not exceed <130/80 mmHg.

## 2021-06-22 ENCOUNTER — OFFICE VISIT (OUTPATIENT)
Dept: OPHTHALMOLOGY | Facility: CLINIC | Age: 71
End: 2021-06-22
Payer: MEDICARE

## 2021-06-22 DIAGNOSIS — H35.3131 EARLY DRY STAGE NONEXUDATIVE AGE-RELATED MACULAR DEGENERATION OF BOTH EYES: ICD-10-CM

## 2021-06-22 DIAGNOSIS — H02.825 CYST OF LEFT LOWER EYELID: ICD-10-CM

## 2021-06-22 DIAGNOSIS — H04.123 DRY EYE SYNDROME, BILATERAL: ICD-10-CM

## 2021-06-22 DIAGNOSIS — H25.12 NUCLEAR SENILE CATARACT OF LEFT EYE: ICD-10-CM

## 2021-06-22 DIAGNOSIS — Z98.41 CATARACT EXTRACTION STATUS, RIGHT: Primary | ICD-10-CM

## 2021-06-22 PROCEDURE — 92136 OPHTHALMIC BIOMETRY: CPT | Mod: PBBFAC | Performed by: OPHTHALMOLOGY

## 2021-06-22 PROCEDURE — 92136 IOL MASTER - OS - LEFT EYE: ICD-10-PCS | Mod: 26,S$PBB,LT, | Performed by: OPHTHALMOLOGY

## 2021-06-22 PROCEDURE — 99213 OFFICE O/P EST LOW 20 MIN: CPT | Mod: PBBFAC,25 | Performed by: OPHTHALMOLOGY

## 2021-06-22 PROCEDURE — 99999 PR PBB SHADOW E&M-EST. PATIENT-LVL III: ICD-10-PCS | Mod: PBBFAC,,, | Performed by: OPHTHALMOLOGY

## 2021-06-22 PROCEDURE — 99999 PR PBB SHADOW E&M-EST. PATIENT-LVL III: CPT | Mod: PBBFAC,,, | Performed by: OPHTHALMOLOGY

## 2021-06-22 PROCEDURE — 99024 PR POST-OP FOLLOW-UP VISIT: ICD-10-PCS | Mod: POP,,, | Performed by: OPHTHALMOLOGY

## 2021-06-22 PROCEDURE — 99024 POSTOP FOLLOW-UP VISIT: CPT | Mod: POP,,, | Performed by: OPHTHALMOLOGY

## 2021-06-22 RX ORDER — DUREZOL 0.5 MG/ML
1 EMULSION OPHTHALMIC 4 TIMES DAILY
Qty: 5 ML | Refills: 0 | Status: SHIPPED | OUTPATIENT
Start: 2021-06-22 | End: 2021-07-02

## 2021-07-08 ENCOUNTER — OUTSIDE PLACE OF SERVICE (OUTPATIENT)
Dept: OPHTHALMOLOGY | Facility: CLINIC | Age: 71
End: 2021-07-08
Payer: MEDICARE

## 2021-07-08 PROCEDURE — 66984 PR REMOVAL, CATARACT, W/INSRT INTRAOC LENS, W/O ENDO CYCLO: ICD-10-PCS | Mod: LT,,, | Performed by: OPHTHALMOLOGY

## 2021-07-08 PROCEDURE — 66984 XCAPSL CTRC RMVL W/O ECP: CPT | Mod: LT,,, | Performed by: OPHTHALMOLOGY

## 2021-07-09 ENCOUNTER — OFFICE VISIT (OUTPATIENT)
Dept: OPHTHALMOLOGY | Facility: CLINIC | Age: 71
End: 2021-07-09
Payer: MEDICARE

## 2021-07-09 DIAGNOSIS — Z98.41 CATARACT EXTRACTION STATUS, RIGHT: ICD-10-CM

## 2021-07-09 DIAGNOSIS — Z98.42 CATARACT EXTRACTION STATUS, LEFT: Primary | ICD-10-CM

## 2021-07-09 DIAGNOSIS — Z98.890 POST-OPERATIVE STATE: ICD-10-CM

## 2021-07-09 PROCEDURE — 99499 UNLISTED E&M SERVICE: CPT | Mod: CSM,S$PBB,LT, | Performed by: OPHTHALMOLOGY

## 2021-07-09 PROCEDURE — 99999 PR PBB SHADOW E&M-EST. PATIENT-LVL II: ICD-10-PCS | Mod: PBBFAC,,, | Performed by: OPHTHALMOLOGY

## 2021-07-09 PROCEDURE — 99024 POSTOP FOLLOW-UP VISIT: CPT | Mod: POP,,, | Performed by: OPHTHALMOLOGY

## 2021-07-09 PROCEDURE — 99999 PR PBB SHADOW E&M-EST. PATIENT-LVL II: CPT | Mod: PBBFAC,,, | Performed by: OPHTHALMOLOGY

## 2021-07-09 PROCEDURE — 99212 OFFICE O/P EST SF 10 MIN: CPT | Mod: PBBFAC | Performed by: OPHTHALMOLOGY

## 2021-07-09 PROCEDURE — 99499 PR MULTI-FOCAL IOL EVAL: ICD-10-PCS | Mod: CSM,S$PBB,LT, | Performed by: OPHTHALMOLOGY

## 2021-07-09 PROCEDURE — 99024 PR POST-OP FOLLOW-UP VISIT: ICD-10-PCS | Mod: POP,,, | Performed by: OPHTHALMOLOGY

## 2021-07-19 ENCOUNTER — OFFICE VISIT (OUTPATIENT)
Dept: OPHTHALMOLOGY | Facility: CLINIC | Age: 71
End: 2021-07-19
Payer: MEDICARE

## 2021-07-19 DIAGNOSIS — Z98.42 CATARACT EXTRACTION STATUS, LEFT: Primary | ICD-10-CM

## 2021-07-19 DIAGNOSIS — Z98.41 CATARACT EXTRACTION STATUS, RIGHT: ICD-10-CM

## 2021-07-19 PROCEDURE — 99024 PR POST-OP FOLLOW-UP VISIT: ICD-10-PCS | Mod: POP,,, | Performed by: OPHTHALMOLOGY

## 2021-07-19 PROCEDURE — 99999 PR PBB SHADOW E&M-EST. PATIENT-LVL III: CPT | Mod: PBBFAC,,, | Performed by: OPHTHALMOLOGY

## 2021-07-19 PROCEDURE — 99024 POSTOP FOLLOW-UP VISIT: CPT | Mod: POP,,, | Performed by: OPHTHALMOLOGY

## 2021-07-19 PROCEDURE — 99999 PR PBB SHADOW E&M-EST. PATIENT-LVL III: ICD-10-PCS | Mod: PBBFAC,,, | Performed by: OPHTHALMOLOGY

## 2021-07-19 PROCEDURE — 99213 OFFICE O/P EST LOW 20 MIN: CPT | Mod: PBBFAC | Performed by: OPHTHALMOLOGY

## 2021-07-22 ENCOUNTER — OFFICE VISIT (OUTPATIENT)
Dept: INTERNAL MEDICINE | Facility: CLINIC | Age: 71
End: 2021-07-22
Payer: MEDICARE

## 2021-07-22 VITALS
TEMPERATURE: 98 F | WEIGHT: 180.75 LBS | OXYGEN SATURATION: 96 % | HEART RATE: 63 BPM | HEIGHT: 66 IN | SYSTOLIC BLOOD PRESSURE: 134 MMHG | BODY MASS INDEX: 29.05 KG/M2 | DIASTOLIC BLOOD PRESSURE: 64 MMHG

## 2021-07-22 DIAGNOSIS — I70.0 AORTO-ILIAC ATHEROSCLEROSIS: ICD-10-CM

## 2021-07-22 DIAGNOSIS — I70.8 AORTO-ILIAC ATHEROSCLEROSIS: ICD-10-CM

## 2021-07-22 DIAGNOSIS — E53.8 VITAMIN B12 DEFICIENCY: ICD-10-CM

## 2021-07-22 DIAGNOSIS — Z72.0 TOBACCO USE: Chronic | ICD-10-CM

## 2021-07-22 DIAGNOSIS — E78.5 HYPERLIPIDEMIA, UNSPECIFIED HYPERLIPIDEMIA TYPE: ICD-10-CM

## 2021-07-22 DIAGNOSIS — E78.5 HYPERLIPIDEMIA WITH TARGET LOW DENSITY LIPOPROTEIN (LDL) CHOLESTEROL LESS THAN 160 MG/DL: Primary | Chronic | ICD-10-CM

## 2021-07-22 DIAGNOSIS — I10 ESSENTIAL HYPERTENSION: Chronic | ICD-10-CM

## 2021-07-22 PROCEDURE — 99999 PR PBB SHADOW E&M-EST. PATIENT-LVL IV: CPT | Mod: PBBFAC,,, | Performed by: FAMILY MEDICINE

## 2021-07-22 PROCEDURE — 99999 PR PBB SHADOW E&M-EST. PATIENT-LVL IV: ICD-10-PCS | Mod: PBBFAC,,, | Performed by: FAMILY MEDICINE

## 2021-07-22 PROCEDURE — 99214 OFFICE O/P EST MOD 30 MIN: CPT | Mod: S$PBB,,, | Performed by: FAMILY MEDICINE

## 2021-07-22 PROCEDURE — 99214 PR OFFICE/OUTPT VISIT, EST, LEVL IV, 30-39 MIN: ICD-10-PCS | Mod: S$PBB,,, | Performed by: FAMILY MEDICINE

## 2021-07-22 PROCEDURE — 99214 OFFICE O/P EST MOD 30 MIN: CPT | Mod: PBBFAC | Performed by: FAMILY MEDICINE

## 2021-07-22 RX ORDER — SIMVASTATIN 20 MG/1
20 TABLET, FILM COATED ORAL NIGHTLY
Qty: 90 TABLET | Refills: 3 | Status: SHIPPED | OUTPATIENT
Start: 2021-07-22 | End: 2022-01-25 | Stop reason: SINTOL

## 2021-08-16 ENCOUNTER — TELEPHONE (OUTPATIENT)
Dept: INTERNAL MEDICINE | Facility: CLINIC | Age: 71
End: 2021-08-16

## 2021-08-16 ENCOUNTER — HOSPITAL ENCOUNTER (OUTPATIENT)
Dept: CARDIOLOGY | Facility: HOSPITAL | Age: 71
Discharge: HOME | End: 2021-08-16
Attending: NURSE PRACTITIONER
Payer: MEDICARE

## 2021-08-16 ENCOUNTER — CLINICAL SUPPORT (OUTPATIENT)
Dept: AUDIOLOGY | Facility: CLINIC | Age: 71
End: 2021-08-16
Payer: MEDICARE

## 2021-08-16 VITALS
HEART RATE: 57 BPM | SYSTOLIC BLOOD PRESSURE: 155 MMHG | DIASTOLIC BLOOD PRESSURE: 76 MMHG | WEIGHT: 180 LBS | HEIGHT: 66 IN | BODY MASS INDEX: 28.93 KG/M2

## 2021-08-16 DIAGNOSIS — Z72.0 TOBACCO USE: ICD-10-CM

## 2021-08-16 DIAGNOSIS — H90.3 SENSORINEURAL HEARING LOSS, ASYMMETRICAL: Primary | ICD-10-CM

## 2021-08-16 DIAGNOSIS — I70.8 AORTO-ILIAC ATHEROSCLEROSIS: ICD-10-CM

## 2021-08-16 DIAGNOSIS — R09.89 DECREASED DORSALIS PEDIS PULSE: ICD-10-CM

## 2021-08-16 DIAGNOSIS — I70.0 AORTO-ILIAC ATHEROSCLEROSIS: ICD-10-CM

## 2021-08-16 DIAGNOSIS — H91.90 HEARING DIFFICULTY, UNSPECIFIED LATERALITY: ICD-10-CM

## 2021-08-16 DIAGNOSIS — R94.120 ABNORMAL HEARING SCREEN: ICD-10-CM

## 2021-08-16 LAB
LEFT ABI: 1.17
LEFT ARM BP: 148 MMHG
LEFT DORSALIS PEDIS: 176 MMHG
LEFT POSTERIOR TIBIAL: 181 MMHG
LEFT TBI: 0.95
LEFT TOE PRESSURE: 148 MMHG
RIGHT ABI: 1.17
RIGHT ARM BP: 155 MMHG
RIGHT DORSALIS PEDIS: 176 MMHG
RIGHT POSTERIOR TIBIAL: 181 MMHG
RIGHT TBI: 0.88
RIGHT TOE PRESSURE: 137 MMHG

## 2021-08-16 PROCEDURE — 92567 TYMPANOMETRY: CPT | Mod: PBBFAC | Performed by: AUDIOLOGIST-HEARING AID FITTER

## 2021-08-16 PROCEDURE — 93922 UPR/L XTREMITY ART 2 LEVELS: CPT | Mod: 26,,, | Performed by: INTERNAL MEDICINE

## 2021-08-16 PROCEDURE — 99999 PR PBB SHADOW E&M-EST. PATIENT-LVL I: ICD-10-PCS | Mod: PBBFAC,,, | Performed by: AUDIOLOGIST-HEARING AID FITTER

## 2021-08-16 PROCEDURE — 92557 COMPREHENSIVE HEARING TEST: CPT | Mod: PBBFAC | Performed by: AUDIOLOGIST-HEARING AID FITTER

## 2021-08-16 PROCEDURE — 93922 UPR/L XTREMITY ART 2 LEVELS: CPT

## 2021-08-16 PROCEDURE — 99999 PR PBB SHADOW E&M-EST. PATIENT-LVL I: CPT | Mod: PBBFAC,,, | Performed by: AUDIOLOGIST-HEARING AID FITTER

## 2021-08-16 PROCEDURE — 99211 OFF/OP EST MAY X REQ PHY/QHP: CPT | Mod: PBBFAC | Performed by: AUDIOLOGIST-HEARING AID FITTER

## 2021-08-16 PROCEDURE — 93922 ANKLE BRACHIAL INDICES (ABI): ICD-10-PCS | Mod: 26,,, | Performed by: INTERNAL MEDICINE

## 2021-09-12 ENCOUNTER — PATIENT MESSAGE (OUTPATIENT)
Dept: INTERNAL MEDICINE | Facility: CLINIC | Age: 71
End: 2021-09-12

## 2021-10-07 ENCOUNTER — OFFICE VISIT (OUTPATIENT)
Dept: DERMATOLOGY | Facility: CLINIC | Age: 71
End: 2021-10-07
Payer: MEDICARE

## 2021-10-07 DIAGNOSIS — L82.1 SEBORRHEIC KERATOSIS: ICD-10-CM

## 2021-10-07 DIAGNOSIS — L57.0 AK (ACTINIC KERATOSIS): Primary | ICD-10-CM

## 2021-10-07 DIAGNOSIS — D22.9 MULTIPLE NEVI: ICD-10-CM

## 2021-10-07 PROCEDURE — 99999 PR PBB SHADOW E&M-EST. PATIENT-LVL III: CPT | Mod: PBBFAC,,, | Performed by: DERMATOLOGY

## 2021-10-07 PROCEDURE — 99999 PR PBB SHADOW E&M-EST. PATIENT-LVL III: ICD-10-PCS | Mod: PBBFAC,,, | Performed by: DERMATOLOGY

## 2021-10-07 PROCEDURE — 99213 OFFICE O/P EST LOW 20 MIN: CPT | Mod: S$PBB,,, | Performed by: DERMATOLOGY

## 2021-10-07 PROCEDURE — 99213 PR OFFICE/OUTPT VISIT, EST, LEVL III, 20-29 MIN: ICD-10-PCS | Mod: S$PBB,,, | Performed by: DERMATOLOGY

## 2021-10-07 PROCEDURE — 99213 OFFICE O/P EST LOW 20 MIN: CPT | Mod: PBBFAC,PO | Performed by: DERMATOLOGY

## 2021-10-19 ENCOUNTER — IMMUNIZATION (OUTPATIENT)
Dept: INTERNAL MEDICINE | Facility: CLINIC | Age: 71
End: 2021-10-19
Payer: MEDICARE

## 2021-10-19 ENCOUNTER — CLINICAL SUPPORT (OUTPATIENT)
Dept: DERMATOLOGY | Facility: CLINIC | Age: 71
End: 2021-10-19
Payer: MEDICARE

## 2021-10-19 ENCOUNTER — LAB VISIT (OUTPATIENT)
Dept: LAB | Facility: HOSPITAL | Age: 71
End: 2021-10-19
Attending: FAMILY MEDICINE
Payer: MEDICARE

## 2021-10-19 DIAGNOSIS — E78.5 HYPERLIPIDEMIA, UNSPECIFIED HYPERLIPIDEMIA TYPE: ICD-10-CM

## 2021-10-19 LAB
ALBUMIN SERPL BCP-MCNC: 4.3 G/DL (ref 3.5–5.2)
ALP SERPL-CCNC: 65 U/L (ref 55–135)
ALT SERPL W/O P-5'-P-CCNC: 25 U/L (ref 10–44)
ANION GAP SERPL CALC-SCNC: 7 MMOL/L (ref 8–16)
AST SERPL-CCNC: 19 U/L (ref 10–40)
BILIRUB SERPL-MCNC: 0.5 MG/DL (ref 0.1–1)
BUN SERPL-MCNC: 22 MG/DL (ref 8–23)
CALCIUM SERPL-MCNC: 10.6 MG/DL (ref 8.7–10.5)
CHLORIDE SERPL-SCNC: 105 MMOL/L (ref 95–110)
CHOLEST SERPL-MCNC: 182 MG/DL (ref 120–199)
CHOLEST/HDLC SERPL: 4.9 {RATIO} (ref 2–5)
CO2 SERPL-SCNC: 28 MMOL/L (ref 23–29)
CREAT SERPL-MCNC: 0.9 MG/DL (ref 0.5–1.4)
EST. GFR  (AFRICAN AMERICAN): >60 ML/MIN/1.73 M^2
EST. GFR  (NON AFRICAN AMERICAN): >60 ML/MIN/1.73 M^2
GLUCOSE SERPL-MCNC: 104 MG/DL (ref 70–110)
HDLC SERPL-MCNC: 37 MG/DL (ref 40–75)
HDLC SERPL: 20.3 % (ref 20–50)
LDLC SERPL CALC-MCNC: 116.6 MG/DL (ref 63–159)
NONHDLC SERPL-MCNC: 145 MG/DL
POTASSIUM SERPL-SCNC: 4.1 MMOL/L (ref 3.5–5.1)
PROT SERPL-MCNC: 7.3 G/DL (ref 6–8.4)
SODIUM SERPL-SCNC: 140 MMOL/L (ref 136–145)
TRIGL SERPL-MCNC: 142 MG/DL (ref 30–150)

## 2021-10-19 PROCEDURE — 90694 VACC AIIV4 NO PRSRV 0.5ML IM: CPT | Mod: PBBFAC

## 2021-10-19 PROCEDURE — G0008 ADMIN INFLUENZA VIRUS VAC: HCPCS | Mod: PBBFAC

## 2021-10-19 PROCEDURE — 99213 OFFICE O/P EST LOW 20 MIN: CPT | Mod: PBBFAC,25,PO

## 2021-10-19 PROCEDURE — 99999 PR PBB SHADOW E&M-EST. PATIENT-LVL III: ICD-10-PCS | Mod: PBBFAC,,,

## 2021-10-19 PROCEDURE — 80061 LIPID PANEL: CPT | Performed by: FAMILY MEDICINE

## 2021-10-19 PROCEDURE — 36415 COLL VENOUS BLD VENIPUNCTURE: CPT | Performed by: FAMILY MEDICINE

## 2021-10-19 PROCEDURE — 80053 COMPREHEN METABOLIC PANEL: CPT | Performed by: FAMILY MEDICINE

## 2021-10-19 PROCEDURE — 99999 PR PBB SHADOW E&M-EST. PATIENT-LVL III: CPT | Mod: PBBFAC,,,

## 2021-12-02 ENCOUNTER — PATIENT OUTREACH (OUTPATIENT)
Dept: ADMINISTRATIVE | Facility: OTHER | Age: 71
End: 2021-12-02
Payer: MEDICARE

## 2021-12-07 ENCOUNTER — OFFICE VISIT (OUTPATIENT)
Dept: OPHTHALMOLOGY | Facility: CLINIC | Age: 71
End: 2021-12-07
Payer: MEDICARE

## 2021-12-07 DIAGNOSIS — Z98.41 CATARACT EXTRACTION STATUS, RIGHT: ICD-10-CM

## 2021-12-07 DIAGNOSIS — H53.9 UNSPECIFIED VISUAL DISTURBANCE: Primary | ICD-10-CM

## 2021-12-07 DIAGNOSIS — Z98.42 CATARACT EXTRACTION STATUS, LEFT: ICD-10-CM

## 2021-12-07 PROCEDURE — 99213 OFFICE O/P EST LOW 20 MIN: CPT | Mod: S$PBB,,, | Performed by: OPHTHALMOLOGY

## 2021-12-07 PROCEDURE — 99213 OFFICE O/P EST LOW 20 MIN: CPT | Mod: PBBFAC | Performed by: OPHTHALMOLOGY

## 2021-12-07 PROCEDURE — 99999 PR PBB SHADOW E&M-EST. PATIENT-LVL III: CPT | Mod: PBBFAC,,, | Performed by: OPHTHALMOLOGY

## 2021-12-07 PROCEDURE — 99999 PR PBB SHADOW E&M-EST. PATIENT-LVL III: ICD-10-PCS | Mod: PBBFAC,,, | Performed by: OPHTHALMOLOGY

## 2021-12-07 PROCEDURE — 99213 PR OFFICE/OUTPT VISIT, EST, LEVL III, 20-29 MIN: ICD-10-PCS | Mod: S$PBB,,, | Performed by: OPHTHALMOLOGY

## 2022-01-18 ENCOUNTER — LAB VISIT (OUTPATIENT)
Dept: LAB | Facility: HOSPITAL | Age: 72
End: 2022-01-18
Attending: FAMILY MEDICINE
Payer: MEDICARE

## 2022-01-18 DIAGNOSIS — E78.5 HYPERLIPIDEMIA, UNSPECIFIED HYPERLIPIDEMIA TYPE: ICD-10-CM

## 2022-01-18 LAB
ALBUMIN SERPL BCP-MCNC: 4 G/DL (ref 3.5–5.2)
ALP SERPL-CCNC: 69 U/L (ref 55–135)
ALT SERPL W/O P-5'-P-CCNC: 22 U/L (ref 10–44)
ANION GAP SERPL CALC-SCNC: 8 MMOL/L (ref 8–16)
AST SERPL-CCNC: 17 U/L (ref 10–40)
BILIRUB SERPL-MCNC: 0.6 MG/DL (ref 0.1–1)
BUN SERPL-MCNC: 17 MG/DL (ref 8–23)
CALCIUM SERPL-MCNC: 10.1 MG/DL (ref 8.7–10.5)
CHLORIDE SERPL-SCNC: 103 MMOL/L (ref 95–110)
CHOLEST SERPL-MCNC: 184 MG/DL (ref 120–199)
CHOLEST/HDLC SERPL: 4.7 {RATIO} (ref 2–5)
CO2 SERPL-SCNC: 27 MMOL/L (ref 23–29)
CREAT SERPL-MCNC: 0.9 MG/DL (ref 0.5–1.4)
EST. GFR  (AFRICAN AMERICAN): >60 ML/MIN/1.73 M^2
EST. GFR  (NON AFRICAN AMERICAN): >60 ML/MIN/1.73 M^2
GLUCOSE SERPL-MCNC: 100 MG/DL (ref 70–110)
HDLC SERPL-MCNC: 39 MG/DL (ref 40–75)
HDLC SERPL: 21.2 % (ref 20–50)
LDLC SERPL CALC-MCNC: 122.6 MG/DL (ref 63–159)
NONHDLC SERPL-MCNC: 145 MG/DL
POTASSIUM SERPL-SCNC: 4.8 MMOL/L (ref 3.5–5.1)
PROT SERPL-MCNC: 7.2 G/DL (ref 6–8.4)
SODIUM SERPL-SCNC: 138 MMOL/L (ref 136–145)
TRIGL SERPL-MCNC: 112 MG/DL (ref 30–150)

## 2022-01-18 PROCEDURE — 80061 LIPID PANEL: CPT | Performed by: FAMILY MEDICINE

## 2022-01-18 PROCEDURE — 80053 COMPREHEN METABOLIC PANEL: CPT | Performed by: FAMILY MEDICINE

## 2022-01-18 PROCEDURE — 36415 COLL VENOUS BLD VENIPUNCTURE: CPT | Performed by: FAMILY MEDICINE

## 2022-01-25 ENCOUNTER — OFFICE VISIT (OUTPATIENT)
Dept: INTERNAL MEDICINE | Facility: CLINIC | Age: 72
End: 2022-01-25
Payer: MEDICARE

## 2022-01-25 VITALS
SYSTOLIC BLOOD PRESSURE: 116 MMHG | OXYGEN SATURATION: 95 % | WEIGHT: 184 LBS | HEART RATE: 69 BPM | BODY MASS INDEX: 29.57 KG/M2 | TEMPERATURE: 97 F | DIASTOLIC BLOOD PRESSURE: 70 MMHG | HEIGHT: 66 IN

## 2022-01-25 DIAGNOSIS — I70.0 AORTO-ILIAC ATHEROSCLEROSIS: ICD-10-CM

## 2022-01-25 DIAGNOSIS — E53.8 VITAMIN B12 DEFICIENCY: ICD-10-CM

## 2022-01-25 DIAGNOSIS — N52.9 ERECTILE DYSFUNCTION, UNSPECIFIED ERECTILE DYSFUNCTION TYPE: ICD-10-CM

## 2022-01-25 DIAGNOSIS — I70.8 AORTO-ILIAC ATHEROSCLEROSIS: ICD-10-CM

## 2022-01-25 DIAGNOSIS — I10 ESSENTIAL HYPERTENSION: ICD-10-CM

## 2022-01-25 DIAGNOSIS — Z72.0 TOBACCO USE: Chronic | ICD-10-CM

## 2022-01-25 DIAGNOSIS — E78.5 HYPERLIPIDEMIA WITH TARGET LOW DENSITY LIPOPROTEIN (LDL) CHOLESTEROL LESS THAN 160 MG/DL: Primary | ICD-10-CM

## 2022-01-25 DIAGNOSIS — Z78.9 STATIN INTOLERANCE: ICD-10-CM

## 2022-01-25 PROCEDURE — 99213 OFFICE O/P EST LOW 20 MIN: CPT | Mod: PBBFAC | Performed by: FAMILY MEDICINE

## 2022-01-25 PROCEDURE — 99999 PR PBB SHADOW E&M-EST. PATIENT-LVL III: CPT | Mod: PBBFAC,,, | Performed by: FAMILY MEDICINE

## 2022-01-25 PROCEDURE — 99999 PR PBB SHADOW E&M-EST. PATIENT-LVL III: ICD-10-PCS | Mod: PBBFAC,,, | Performed by: FAMILY MEDICINE

## 2022-01-25 PROCEDURE — 99214 OFFICE O/P EST MOD 30 MIN: CPT | Mod: S$PBB,,, | Performed by: FAMILY MEDICINE

## 2022-01-25 PROCEDURE — 99214 PR OFFICE/OUTPT VISIT, EST, LEVL IV, 30-39 MIN: ICD-10-PCS | Mod: S$PBB,,, | Performed by: FAMILY MEDICINE

## 2022-01-25 RX ORDER — TADALAFIL 10 MG/1
TABLET ORAL
Qty: 10 TABLET | Refills: 1 | Status: SHIPPED | OUTPATIENT
Start: 2022-01-25

## 2022-01-25 NOTE — PROGRESS NOTES
Subjective:       Patient ID: Neymar Victoria is a 71 y.o. male.    Chief Complaint: Follow-up (6 month f/u. Patient would like to change the sildenafil to a different medication)    Patient presents to clinic today for followup of chronic conditions. Patient reports he stopped statin due to myalgias, resolved. Desires to try cialis prn, reports constipation with viagra. Patient is otherwise without concerns today.      Review of Systems   Constitutional: Negative for chills, fatigue, fever and unexpected weight change.   Eyes: Negative for visual disturbance.   Respiratory: Negative for shortness of breath.    Cardiovascular: Negative for chest pain.   Musculoskeletal: Negative for myalgias.   Neurological: Negative for headaches.         Objective:      Physical Exam  Vitals reviewed.   Constitutional:       General: He is not in acute distress.     Appearance: He is well-developed.   HENT:      Head: Normocephalic and atraumatic.   Eyes:      General: Lids are normal. No scleral icterus.     Extraocular Movements: Extraocular movements intact.      Conjunctiva/sclera: Conjunctivae normal.      Pupils: Pupils are equal, round, and reactive to light.   Pulmonary:      Effort: Pulmonary effort is normal.   Neurological:      Mental Status: He is alert and oriented to person, place, and time.      Cranial Nerves: No cranial nerve deficit.      Gait: Gait normal.   Psychiatric:         Mood and Affect: Mood and affect normal.         Assessment:       1. Hyperlipidemia with target low density lipoprotein (LDL) cholesterol less than 160 mg/dL    2. Vitamin B12 deficiency    3. Essential hypertension    4. Statin intolerance    5. Erectile dysfunction, unspecified erectile dysfunction type    6. Aorto-iliac atherosclerosis    7. Tobacco use        Plan:     Problem List Items Addressed This Visit     Aorto-iliac atherosclerosis    Overview      4/2018         Erectile dysfunction    Relevant Medications    tadalafiL  (CIALIS) 10 MG tablet    Essential hypertension (Chronic)    Current Assessment & Plan     Controlled, continue amlodipine-valsartan, hydrochlorothiazide and metoprolol         Relevant Orders    TSH    CBC Auto Differential    Hyperlipidemia with target low density lipoprotein (LDL) cholesterol less than 160 mg/dL - Primary (Chronic)    Current Assessment & Plan     Controlled with diet         Relevant Orders    Comprehensive Metabolic Panel    Lipid Panel    Statin intolerance    Overview     Muscle pain with simvastatin         Tobacco use (Chronic)    Current Assessment & Plan     Working on quitting; declines smoking cessation program         Vitamin B12 deficiency    Relevant Orders    Vitamin B12        Health Maintenance reviewed/updated.  Discussed shingrix and Tdap vaccines, given informational handouts, advised can be obtained at pharmacy.

## 2022-04-12 PROBLEM — M60.9 STATIN-INDUCED MYOSITIS: Status: ACTIVE | Noted: 2022-04-12

## 2022-04-12 PROBLEM — T46.6X5A STATIN-INDUCED MYOSITIS: Status: ACTIVE | Noted: 2022-04-12

## 2022-06-09 ENCOUNTER — TELEPHONE (OUTPATIENT)
Dept: ADMINISTRATIVE | Facility: HOSPITAL | Age: 72
End: 2022-06-09
Payer: MEDICARE

## 2022-07-18 ENCOUNTER — LAB VISIT (OUTPATIENT)
Dept: LAB | Facility: HOSPITAL | Age: 72
End: 2022-07-18
Attending: FAMILY MEDICINE
Payer: MEDICARE

## 2022-07-18 DIAGNOSIS — E53.8 VITAMIN B12 DEFICIENCY: ICD-10-CM

## 2022-07-18 DIAGNOSIS — I10 ESSENTIAL HYPERTENSION: ICD-10-CM

## 2022-07-18 DIAGNOSIS — E78.5 HYPERLIPIDEMIA WITH TARGET LOW DENSITY LIPOPROTEIN (LDL) CHOLESTEROL LESS THAN 160 MG/DL: ICD-10-CM

## 2022-07-18 LAB
ALBUMIN SERPL BCP-MCNC: 4.4 G/DL (ref 3.5–5.2)
ALP SERPL-CCNC: 73 U/L (ref 55–135)
ALT SERPL W/O P-5'-P-CCNC: 23 U/L (ref 10–44)
ANION GAP SERPL CALC-SCNC: 13 MMOL/L (ref 8–16)
AST SERPL-CCNC: 17 U/L (ref 10–40)
BASOPHILS # BLD AUTO: 0.05 K/UL (ref 0–0.2)
BASOPHILS NFR BLD: 0.6 % (ref 0–1.9)
BILIRUB SERPL-MCNC: 0.8 MG/DL (ref 0.1–1)
BUN SERPL-MCNC: 24 MG/DL (ref 8–23)
CALCIUM SERPL-MCNC: 10.4 MG/DL (ref 8.7–10.5)
CHLORIDE SERPL-SCNC: 105 MMOL/L (ref 95–110)
CHOLEST SERPL-MCNC: 189 MG/DL (ref 120–199)
CHOLEST/HDLC SERPL: 5.6 {RATIO} (ref 2–5)
CO2 SERPL-SCNC: 26 MMOL/L (ref 23–29)
CREAT SERPL-MCNC: 1.1 MG/DL (ref 0.5–1.4)
DIFFERENTIAL METHOD: NORMAL
EOSINOPHIL # BLD AUTO: 0.2 K/UL (ref 0–0.5)
EOSINOPHIL NFR BLD: 2.2 % (ref 0–8)
ERYTHROCYTE [DISTWIDTH] IN BLOOD BY AUTOMATED COUNT: 13.2 % (ref 11.5–14.5)
EST. GFR  (AFRICAN AMERICAN): >60 ML/MIN/1.73 M^2
EST. GFR  (NON AFRICAN AMERICAN): >60 ML/MIN/1.73 M^2
GLUCOSE SERPL-MCNC: 111 MG/DL (ref 70–110)
HCT VFR BLD AUTO: 48 % (ref 40–54)
HDLC SERPL-MCNC: 34 MG/DL (ref 40–75)
HDLC SERPL: 18 % (ref 20–50)
HGB BLD-MCNC: 15.8 G/DL (ref 14–18)
IMM GRANULOCYTES # BLD AUTO: 0.02 K/UL (ref 0–0.04)
IMM GRANULOCYTES NFR BLD AUTO: 0.2 % (ref 0–0.5)
LDLC SERPL CALC-MCNC: 130.6 MG/DL (ref 63–159)
LYMPHOCYTES # BLD AUTO: 2 K/UL (ref 1–4.8)
LYMPHOCYTES NFR BLD: 21.9 % (ref 18–48)
MCH RBC QN AUTO: 30.8 PG (ref 27–31)
MCHC RBC AUTO-ENTMCNC: 32.9 G/DL (ref 32–36)
MCV RBC AUTO: 94 FL (ref 82–98)
MONOCYTES # BLD AUTO: 0.7 K/UL (ref 0.3–1)
MONOCYTES NFR BLD: 7.8 % (ref 4–15)
NEUTROPHILS # BLD AUTO: 6.1 K/UL (ref 1.8–7.7)
NEUTROPHILS NFR BLD: 67.3 % (ref 38–73)
NONHDLC SERPL-MCNC: 155 MG/DL
NRBC BLD-RTO: 0 /100 WBC
PLATELET # BLD AUTO: 229 K/UL (ref 150–450)
PMV BLD AUTO: 10.1 FL (ref 9.2–12.9)
POTASSIUM SERPL-SCNC: 4.5 MMOL/L (ref 3.5–5.1)
PROT SERPL-MCNC: 7.3 G/DL (ref 6–8.4)
RBC # BLD AUTO: 5.13 M/UL (ref 4.6–6.2)
SODIUM SERPL-SCNC: 144 MMOL/L (ref 136–145)
TRIGL SERPL-MCNC: 122 MG/DL (ref 30–150)
TSH SERPL DL<=0.005 MIU/L-ACNC: 2.78 UIU/ML (ref 0.4–4)
VIT B12 SERPL-MCNC: 1199 PG/ML (ref 210–950)
WBC # BLD AUTO: 9.01 K/UL (ref 3.9–12.7)

## 2022-07-18 PROCEDURE — 85025 COMPLETE CBC W/AUTO DIFF WBC: CPT | Performed by: FAMILY MEDICINE

## 2022-07-18 PROCEDURE — 80061 LIPID PANEL: CPT | Performed by: FAMILY MEDICINE

## 2022-07-18 PROCEDURE — 80053 COMPREHEN METABOLIC PANEL: CPT | Performed by: FAMILY MEDICINE

## 2022-07-18 PROCEDURE — 82607 VITAMIN B-12: CPT | Performed by: FAMILY MEDICINE

## 2022-07-18 PROCEDURE — 36415 COLL VENOUS BLD VENIPUNCTURE: CPT | Performed by: FAMILY MEDICINE

## 2022-07-18 PROCEDURE — 84443 ASSAY THYROID STIM HORMONE: CPT | Performed by: FAMILY MEDICINE

## 2022-07-25 ENCOUNTER — OFFICE VISIT (OUTPATIENT)
Dept: INTERNAL MEDICINE | Facility: CLINIC | Age: 72
End: 2022-07-25
Payer: MEDICARE

## 2022-07-25 VITALS
WEIGHT: 181.44 LBS | DIASTOLIC BLOOD PRESSURE: 70 MMHG | HEIGHT: 66 IN | BODY MASS INDEX: 29.16 KG/M2 | HEART RATE: 60 BPM | SYSTOLIC BLOOD PRESSURE: 150 MMHG | OXYGEN SATURATION: 96 % | TEMPERATURE: 98 F

## 2022-07-25 DIAGNOSIS — K63.5 POLYP OF COLON, UNSPECIFIED PART OF COLON, UNSPECIFIED TYPE: Primary | ICD-10-CM

## 2022-07-25 DIAGNOSIS — Z86.010 PERSONAL HISTORY OF COLONIC POLYPS: ICD-10-CM

## 2022-07-25 DIAGNOSIS — T46.6X5A STATIN-INDUCED MYOSITIS: ICD-10-CM

## 2022-07-25 DIAGNOSIS — I70.8 AORTO-ILIAC ATHEROSCLEROSIS: ICD-10-CM

## 2022-07-25 DIAGNOSIS — E78.5 HYPERLIPIDEMIA WITH TARGET LOW DENSITY LIPOPROTEIN (LDL) CHOLESTEROL LESS THAN 160 MG/DL: Chronic | ICD-10-CM

## 2022-07-25 DIAGNOSIS — I10 ESSENTIAL HYPERTENSION: Primary | Chronic | ICD-10-CM

## 2022-07-25 DIAGNOSIS — I70.0 AORTO-ILIAC ATHEROSCLEROSIS: ICD-10-CM

## 2022-07-25 DIAGNOSIS — Z72.0 TOBACCO USE: Chronic | ICD-10-CM

## 2022-07-25 DIAGNOSIS — M60.9 STATIN-INDUCED MYOSITIS: ICD-10-CM

## 2022-07-25 DIAGNOSIS — E53.8 VITAMIN B12 DEFICIENCY: ICD-10-CM

## 2022-07-25 PROBLEM — L72.0 CYST OF SKIN AND SUBCUTANEOUS TISSUE: Status: RESOLVED | Noted: 2018-09-27 | Resolved: 2022-07-25

## 2022-07-25 PROCEDURE — 99214 OFFICE O/P EST MOD 30 MIN: CPT | Mod: S$PBB,,, | Performed by: PHYSICIAN ASSISTANT

## 2022-07-25 PROCEDURE — 99999 PR PBB SHADOW E&M-EST. PATIENT-LVL IV: CPT | Mod: PBBFAC,,, | Performed by: PHYSICIAN ASSISTANT

## 2022-07-25 PROCEDURE — 99214 OFFICE O/P EST MOD 30 MIN: CPT | Mod: PBBFAC | Performed by: PHYSICIAN ASSISTANT

## 2022-07-25 PROCEDURE — 99214 PR OFFICE/OUTPT VISIT, EST, LEVL IV, 30-39 MIN: ICD-10-PCS | Mod: S$PBB,,, | Performed by: PHYSICIAN ASSISTANT

## 2022-07-25 PROCEDURE — 99999 PR PBB SHADOW E&M-EST. PATIENT-LVL IV: ICD-10-PCS | Mod: PBBFAC,,, | Performed by: PHYSICIAN ASSISTANT

## 2022-07-25 RX ORDER — SODIUM, POTASSIUM,MAG SULFATES 17.5-3.13G
SOLUTION, RECONSTITUTED, ORAL ORAL
Qty: 1 KIT | Refills: 0 | Status: SHIPPED | OUTPATIENT
Start: 2022-07-25 | End: 2022-08-14

## 2022-07-25 NOTE — PROGRESS NOTES
Subjective:       Patient ID: Neymar Victoria is a 72 y.o. male.    Chief Complaint: Annual Exam      Patient Care Team:  Susan Chávez MD as PCP - General (Family Medicine)  Aleta Billings LPN as Care Coordinator (Internal Medicine)  Sean Estevez MD as Consulting Physician (Ophthalmology)  Shanice Valderrama MD as Consulting Physician (Dermatology)  Praneeth Parmar DPM as Consulting Physician (Podiatry)  Caroline Park PA-C as Physician Assistant (Internal Medicine)    Patient presents to clinic today for annual physical exam.      Review of Systems   Constitutional: Negative for chills, fatigue, fever and unexpected weight change.   HENT: Negative for congestion, dental problem, ear pain, hearing loss, rhinorrhea and trouble swallowing.    Eyes: Negative for pain and visual disturbance.   Respiratory: Negative for cough and shortness of breath.    Cardiovascular: Negative for chest pain, palpitations and leg swelling.   Gastrointestinal: Negative for abdominal distention, abdominal pain, blood in stool, constipation, diarrhea, nausea and vomiting.   Genitourinary: Negative for difficulty urinating, scrotal swelling and testicular pain.   Musculoskeletal: Positive for arthralgias ( chronic - arthritis in hands). Negative for myalgias.   Skin: Negative for rash.   Neurological: Negative for dizziness, weakness, numbness and headaches.   Hematological: Negative for adenopathy. Does not bruise/bleed easily.   Psychiatric/Behavioral: Negative for dysphoric mood and sleep disturbance. The patient is not nervous/anxious.        Objective:      Physical Exam  Vitals and nursing note reviewed.   Constitutional:       General: He is not in acute distress.     Appearance: He is well-developed.   HENT:      Head: Normocephalic and atraumatic.      Right Ear: Hearing, tympanic membrane, ear canal and external ear normal.      Left Ear: Hearing, tympanic membrane, ear canal and external ear normal.      Nose:  Nose normal.      Mouth/Throat:      Lips: Pink.      Mouth: Mucous membranes are moist.      Pharynx: Oropharynx is clear. Uvula midline.   Eyes:      General: Lids are normal. No scleral icterus.     Conjunctiva/sclera: Conjunctivae normal.      Pupils: Pupils are equal, round, and reactive to light.   Neck:      Thyroid: No thyromegaly.   Cardiovascular:      Rate and Rhythm: Normal rate and regular rhythm.      Pulses: Normal pulses.   Pulmonary:      Effort: Pulmonary effort is normal.      Breath sounds: Normal breath sounds. No wheezing or rales.   Abdominal:      General: Bowel sounds are normal. There is no distension.      Palpations: Abdomen is soft. There is no mass.      Tenderness: There is no abdominal tenderness.   Musculoskeletal:         General: No tenderness. Normal range of motion.      Cervical back: Normal range of motion and neck supple.      Right lower leg: No edema.      Left lower leg: No edema.   Lymphadenopathy:      Cervical: No cervical adenopathy.   Skin:     General: Skin is warm and dry.      Findings: No rash.   Neurological:      Mental Status: He is alert.      Cranial Nerves: No cranial nerve deficit.   Psychiatric:         Mood and Affect: Mood and affect normal.         Assessment:       1. Essential hypertension    2. Hyperlipidemia with target low density lipoprotein (LDL) cholesterol less than 160 mg/dL    3. Vitamin B12 deficiency    4. Statin-induced myositis    5. Tobacco use    6. Personal history of colonic polyps    7. Aorto-iliac atherosclerosis        Plan:   1. Essential hypertension  Assessment & Plan:  /70, elevated, continue Exforge, hydrochlorothiazide and metoprolol.  Nurse visit in 2 weeks for blood pressure recheck.  Lab Results   Component Value Date     07/18/2022    K 4.5 07/18/2022    BUN 24 (H) 07/18/2022    CREATININE 1.1 07/18/2022    EGFRNONAA >60.0 07/18/2022    ESTGFRAFRICA >60.0 07/18/2022         2. Hyperlipidemia with target low  density lipoprotein (LDL) cholesterol less than 160 mg/dL  Assessment & Plan:  History of statin induced myositis  Lab Results   Component Value Date    CHOL 189 07/18/2022    CHOL 184 01/18/2022    LDLCALC 130.6 07/18/2022    LDLCALC 122.6 01/18/2022    TRIG 122 07/18/2022    HDL 34 (L) 07/18/2022    ALT 23 07/18/2022    AST 17 07/18/2022    ALKPHOS 73 07/18/2022       Orders:  -     Comprehensive Metabolic Panel  -     Lipid Panel    3. Vitamin B12 deficiency  Assessment & Plan:  Stable, continue supplement      4. Statin-induced myositis    5. Tobacco use  Overview:  40 years X 0.5 PPD    Assessment & Plan:  Encouraged cessation but declines at this time      6. Personal history of colonic polyps  -     Ambulatory referral/consult to Endo Procedure     7. Aorto-iliac atherosclerosis  Overview:  US 4/2018    Assessment & Plan:  Statin intolerant, continue low fat diet        Recent labs reviewed with patient.    Discussed shingrix vaccine, advised can be obtained at pharmacy.  Discussed Covid booster, scheduling information given.  Check with your pharmacist regarding Tdap (tetanus/diphtheria/pertussis) vaccine.  6 month f/u with Dr. Chávez scheduled with fasting labs PTA  C-scope ordered  Health Maintenance reviewed/updated.

## 2022-07-25 NOTE — ASSESSMENT & PLAN NOTE
/70, elevated, continue Exforge, hydrochlorothiazide and metoprolol.  Nurse visit in 2 weeks for blood pressure recheck.  Lab Results   Component Value Date     07/18/2022    K 4.5 07/18/2022    BUN 24 (H) 07/18/2022    CREATININE 1.1 07/18/2022    EGFRNONAA >60.0 07/18/2022    ESTGFRAFRICA >60.0 07/18/2022

## 2022-07-25 NOTE — ASSESSMENT & PLAN NOTE
History of statin induced myositis  Lab Results   Component Value Date    CHOL 189 07/18/2022    CHOL 184 01/18/2022    LDLCALC 130.6 07/18/2022    LDLCALC 122.6 01/18/2022    TRIG 122 07/18/2022    HDL 34 (L) 07/18/2022    ALT 23 07/18/2022    AST 17 07/18/2022    ALKPHOS 73 07/18/2022

## 2022-07-25 NOTE — PATIENT INSTRUCTIONS
Check with your pharmacist regarding shingrix vaccine.     Check with your pharmacist regarding Tdap (tetanus/diphtheria/pertussis) vaccine.    Dehydration: Care Instructions  Your Care Instructions  Dehydration happens when your body loses too much fluid. This might happen when you do not drink enough water or you lose large amounts of fluids from your body because of diarrhea, vomiting, or sweating. Severe dehydration can be life-threatening. Water and minerals called electrolytes help put your body fluids back in balance. Learn the early signs of fluid loss, and drink more fluids to prevent dehydration. Follow-up care is a key part of your treatment and safety. Be sure to make and go to all appointments, and call your doctor if you are having problems. It's also a good idea to know your test results and keep a list of the medicines you take. How can you care for yourself at home? · To prevent dehydration, drink plenty of fluids, enough so that your urine is light yellow or clear like water. Choose water and other caffeine-free clear liquids until you feel better. If you have kidney, heart, or liver disease and have to limit fluids, talk with your doctor before you increase the amount of fluids you drink. · If you do not feel like eating or drinking, try taking small sips of water, sports drinks, or other rehydration drinks. · Get plenty of rest.  To prevent dehydration  · Add more fluids to your diet and daily routine, unless your doctor has told you not to. · During hot weather, drink more fluids. Drink even more fluids if you exercise a lot. Stay away from drinks with alcohol or caffeine. · Watch for the symptoms of dehydration. These include:  ¨ A dry, sticky mouth. ¨ Dark yellow urine, and not much of it. ¨ Dry and sunken eyes. ¨ Feeling very tired. · Learn what problems can lead to dehydration. These include:  ¨ Diarrhea, fever, and vomiting. ¨ Any illness with a fever, such as pneumonia or the flu. ¨ Activities that cause heavy sweating, such as endurance races and heavy outdoor work in hot or humid weather.   ¨ Alcohol or drug abuse or withdrawal.  ¨ Certain medicines, such as cold and allergy pills (antihistamines), diet pills (diuretics), and laxatives. ¨ Certain diseases, such as diabetes, cancer, and heart or kidney disease. When should you call for help? Call 911 anytime you think you may need emergency care. For example, call if:  ? · You passed out (lost consciousness). ?Call your doctor now or seek immediate medical care if:  ? · You are confused and cannot think clearly. ? · You are dizzy or lightheaded, or you feel like you may faint. ? · You have signs of needing more fluids. You have sunken eyes and a dry mouth, and you pass only a little dark urine. ? · You cannot keep fluids down. ? Watch closely for changes in your health, and be sure to contact your doctor if:  ? · You are not making tears. ? · Your skin is very dry and sags slowly back into place after you pinch it. ? · Your mouth and eyes are very dry. Where can you learn more? Go to http://gómez-maxine.info/. Enter S174 in the search box to learn more about \"Dehydration: Care Instructions. \"  Current as of: March 20, 2017  Content Version: 11.4  © 4438-3438 [employer]. Care instructions adapted under license by [employer] (which disclaims liability or warranty for this information). If you have questions about a medical condition or this instruction, always ask your healthcare professional. [place] [address] any warranty or liability for your use of this information. Atrial Fibrillation: Care Instructions  Your Care Instructions    Atrial fibrillation is an irregular and often fast heartbeat. Treating this condition is important for several reasons. It can cause blood clots, which can travel from your heart to your brain and cause a stroke. If you have a fast heartbeat, you may feel lightheaded, dizzy, and weak.  An irregular heartbeat can also increase your risk for heart failure. Atrial fibrillation is often the result of another heart condition, such as high blood pressure or coronary artery disease. Making changes to improve your heart condition will help you stay healthy and active. Follow-up care is a key part of your treatment and safety. Be sure to make and go to all appointments, and call your doctor if you are having problems. It's also a good idea to know your test results and keep a list of the medicines you take. How can you care for yourself at home? Medicines  ? · Take your medicines exactly as prescribed. Call your doctor if you think you are having a problem with your medicine. You will get more details on the specific medicines your doctor prescribes. ? · If your doctor has given you a blood thinner to prevent a stroke, be sure you get instructions about how to take your medicine safely. Blood thinners can cause serious bleeding problems. ? · Do not take any vitamins, over-the-counter drugs, or herbal products without talking to your doctor first.   ? Lifestyle changes  ? · Do not smoke. Smoking can increase your chance of a stroke and heart attack. If you need help quitting, talk to your doctor about stop-smoking programs and medicines. These can increase your chances of quitting for good. ? · Eat a heart-healthy diet. ? · Stay at a healthy weight. Lose weight if you need to.   ? · Limit alcohol to 2 drinks a day for men and 1 drink a day for women. Too much alcohol can cause health problems. ? · Avoid colds and flu. Get a pneumococcal vaccine shot. If you have had one before, ask your doctor whether you need another dose. Get a flu shot every year. If you must be around people with colds or flu, wash your hands often. Activity  ? · If your doctor recommends it, get more exercise. Walking is a good choice. Bit by bit, increase the amount you walk every day. Try for at least 30 minutes on most days of the week.  You also may want to swim, bike, or do other activities. Your doctor may suggest that you join a cardiac rehabilitation program so that you can have help increasing your physical activity safely. ? · Start light exercise if your doctor says it is okay. Even a small amount will help you get stronger, have more energy, and manage stress. Walking is an easy way to get exercise. Start out by walking a little more than you did in the hospital. Gradually increase the amount you walk. ? · When you exercise, watch for signs that your heart is working too hard. You are pushing too hard if you cannot talk while you are exercising. If you become short of breath or dizzy or have chest pain, sit down and rest immediately. ? · Check your pulse regularly. Place two fingers on the artery at the palm side of your wrist, in line with your thumb. If your heartbeat seems uneven or fast, talk to your doctor. When should you call for help? Call 911 anytime you think you may need emergency care. For example, call if:  ? · You have symptoms of a heart attack. These may include:  ¨ Chest pain or pressure, or a strange feeling in the chest.  ¨ Sweating. ¨ Shortness of breath. ¨ Nausea or vomiting. ¨ Pain, pressure, or a strange feeling in the back, neck, jaw, or upper belly or in one or both shoulders or arms. ¨ Lightheadedness or sudden weakness. ¨ A fast or irregular heartbeat. After you call 911, the  may tell you to chew 1 adult-strength or 2 to 4 low-dose aspirin. Wait for an ambulance. Do not try to drive yourself. ? · You have symptoms of a stroke. These may include:  ¨ Sudden numbness, tingling, weakness, or loss of movement in your face, arm, or leg, especially on only one side of your body. ¨ Sudden vision changes. ¨ Sudden trouble speaking. ¨ Sudden confusion or trouble understanding simple statements. ¨ Sudden problems with walking or balance. ¨ A sudden, severe headache that is different from past headaches.    ? · You passed out (lost consciousness). ?Call your doctor now or seek immediate medical care if:  ? · You have new or increased shortness of breath. ? · You feel dizzy or lightheaded, or you feel like you may faint. ? · Your heart rate becomes irregular. ? · You can feel your heart flutter in your chest or skip heartbeats. Tell your doctor if these symptoms are new or worse. ? Watch closely for changes in your health, and be sure to contact your doctor if you have any problems. Where can you learn more? Go to http://gómez-maxine.info/. Enter U020 in the search box to learn more about \"Atrial Fibrillation: Care Instructions. \"  Current as of: September 21, 2016  Content Version: 11.4  © 5468-9724 [employer]. Care instructions adapted under license by [employer] (which disclaims liability or warranty for this information). If you have questions about a medical condition or this instruction, always ask your healthcare professional. [address] any warranty or liability for your use of this information.

## 2022-08-08 ENCOUNTER — CLINICAL SUPPORT (OUTPATIENT)
Dept: INTERNAL MEDICINE | Facility: CLINIC | Age: 72
End: 2022-08-08
Payer: MEDICARE

## 2022-08-08 ENCOUNTER — TELEPHONE (OUTPATIENT)
Dept: INTERNAL MEDICINE | Facility: CLINIC | Age: 72
End: 2022-08-08

## 2022-08-08 VITALS — DIASTOLIC BLOOD PRESSURE: 70 MMHG | SYSTOLIC BLOOD PRESSURE: 136 MMHG

## 2022-08-08 NOTE — PROGRESS NOTES
Pt presents to clinic for NV bp check.  Two pt identifiers used.  Pt verbalized reason for visit.  Manual bp was 136/70.  Pt verbalized understanding of reading.  Pt stated that he had questions about Rx that Endo had sent for upcoming colonoscopy procedure.  Told pt that a message would be sent to Endo to reach out to pt.  Pt verbalized understanding and was escorted back to lobby.  Message sent to Endo on pts behalf.

## 2022-08-15 ENCOUNTER — PATIENT MESSAGE (OUTPATIENT)
Dept: PREADMISSION TESTING | Facility: HOSPITAL | Age: 72
End: 2022-08-15
Payer: MEDICARE

## 2022-08-25 ENCOUNTER — TELEPHONE (OUTPATIENT)
Dept: PREADMISSION TESTING | Facility: HOSPITAL | Age: 72
End: 2022-08-25
Payer: MEDICARE

## 2022-09-09 ENCOUNTER — HOSPITAL ENCOUNTER (OUTPATIENT)
Facility: HOSPITAL | Age: 72
Discharge: HOME | End: 2022-09-09
Attending: COLON & RECTAL SURGERY | Admitting: COLON & RECTAL SURGERY
Payer: MEDICARE

## 2022-09-09 ENCOUNTER — ANESTHESIA EVENT (OUTPATIENT)
Dept: ENDOSCOPY | Facility: HOSPITAL | Age: 72
End: 2022-09-09
Payer: MEDICARE

## 2022-09-09 ENCOUNTER — ANESTHESIA (OUTPATIENT)
Dept: ENDOSCOPY | Facility: HOSPITAL | Age: 72
End: 2022-09-09
Payer: MEDICARE

## 2022-09-09 VITALS
OXYGEN SATURATION: 96 % | HEIGHT: 66 IN | TEMPERATURE: 98 F | BODY MASS INDEX: 28.28 KG/M2 | WEIGHT: 176 LBS | RESPIRATION RATE: 18 BRPM | SYSTOLIC BLOOD PRESSURE: 122 MMHG | DIASTOLIC BLOOD PRESSURE: 69 MMHG | HEART RATE: 58 BPM

## 2022-09-09 DIAGNOSIS — Z12.11 SCREENING FOR COLON CANCER: ICD-10-CM

## 2022-09-09 PROCEDURE — 37000009 HC ANESTHESIA EA ADD 15 MINS: Performed by: COLON & RECTAL SURGERY

## 2022-09-09 PROCEDURE — 27201089 HC SNARE, DISP (ANY): Performed by: COLON & RECTAL SURGERY

## 2022-09-09 PROCEDURE — 88305 TISSUE EXAM BY PATHOLOGIST: ICD-10-PCS | Mod: 26,,, | Performed by: PATHOLOGY

## 2022-09-09 PROCEDURE — 45380 COLONOSCOPY AND BIOPSY: CPT | Mod: 59,,, | Performed by: COLON & RECTAL SURGERY

## 2022-09-09 PROCEDURE — 88305 TISSUE EXAM BY PATHOLOGIST: CPT | Performed by: PATHOLOGY

## 2022-09-09 PROCEDURE — 27201012 HC FORCEPS, HOT/COLD, DISP: Performed by: COLON & RECTAL SURGERY

## 2022-09-09 PROCEDURE — 88305 TISSUE EXAM BY PATHOLOGIST: CPT | Mod: 26,,, | Performed by: PATHOLOGY

## 2022-09-09 PROCEDURE — 45385 COLONOSCOPY W/LESION REMOVAL: CPT | Performed by: COLON & RECTAL SURGERY

## 2022-09-09 PROCEDURE — 25000003 PHARM REV CODE 250: Performed by: NURSE ANESTHETIST, CERTIFIED REGISTERED

## 2022-09-09 PROCEDURE — 45385 COLONOSCOPY W/LESION REMOVAL: CPT | Mod: PT,,, | Performed by: COLON & RECTAL SURGERY

## 2022-09-09 PROCEDURE — 63600175 PHARM REV CODE 636 W HCPCS: Performed by: NURSE ANESTHETIST, CERTIFIED REGISTERED

## 2022-09-09 PROCEDURE — 45380 PR COLONOSCOPY,BIOPSY: ICD-10-PCS | Mod: 59,,, | Performed by: COLON & RECTAL SURGERY

## 2022-09-09 PROCEDURE — 45380 COLONOSCOPY AND BIOPSY: CPT | Performed by: COLON & RECTAL SURGERY

## 2022-09-09 PROCEDURE — 37000008 HC ANESTHESIA 1ST 15 MINUTES: Performed by: COLON & RECTAL SURGERY

## 2022-09-09 PROCEDURE — 25000003 PHARM REV CODE 250: Performed by: COLON & RECTAL SURGERY

## 2022-09-09 PROCEDURE — 45385 PR COLONOSCOPY,REMV LESN,SNARE: ICD-10-PCS | Mod: PT,,, | Performed by: COLON & RECTAL SURGERY

## 2022-09-09 RX ORDER — DEXTROMETHORPHAN/PSEUDOEPHED 2.5-7.5/.8
DROPS ORAL
Status: DISCONTINUED | OUTPATIENT
Start: 2022-09-09 | End: 2022-09-09 | Stop reason: HOSPADM

## 2022-09-09 RX ORDER — LIDOCAINE HYDROCHLORIDE 10 MG/ML
INJECTION, SOLUTION EPIDURAL; INFILTRATION; INTRACAUDAL; PERINEURAL
Status: DISCONTINUED | OUTPATIENT
Start: 2022-09-09 | End: 2022-09-09

## 2022-09-09 RX ORDER — PROPOFOL 10 MG/ML
VIAL (ML) INTRAVENOUS
Status: DISCONTINUED | OUTPATIENT
Start: 2022-09-09 | End: 2022-09-09

## 2022-09-09 RX ORDER — SODIUM CHLORIDE, SODIUM LACTATE, POTASSIUM CHLORIDE, CALCIUM CHLORIDE 600; 310; 30; 20 MG/100ML; MG/100ML; MG/100ML; MG/100ML
INJECTION, SOLUTION INTRAVENOUS CONTINUOUS
Status: DISCONTINUED | OUTPATIENT
Start: 2022-09-09 | End: 2022-09-09 | Stop reason: HOSPADM

## 2022-09-09 RX ADMIN — PROPOFOL 30 MG: 10 INJECTION, EMULSION INTRAVENOUS at 09:09

## 2022-09-09 RX ADMIN — SODIUM CHLORIDE, SODIUM LACTATE, POTASSIUM CHLORIDE, AND CALCIUM CHLORIDE: .6; .31; .03; .02 INJECTION, SOLUTION INTRAVENOUS at 06:09

## 2022-09-09 RX ADMIN — PROPOFOL 80 MG: 10 INJECTION, EMULSION INTRAVENOUS at 09:09

## 2022-09-09 RX ADMIN — PROPOFOL 20 MG: 10 INJECTION, EMULSION INTRAVENOUS at 09:09

## 2022-09-09 RX ADMIN — LIDOCAINE HYDROCHLORIDE 40 MG: 10 INJECTION, SOLUTION EPIDURAL; INFILTRATION; INTRACAUDAL; PERINEURAL at 09:09

## 2022-09-09 NOTE — BRIEF OP NOTE
[facility] - Endoscopy (Hospital)  Brief Operative Note     SUMMARY     Surgery Date: 9/9/2022     Surgeon(s) and Role:     * Milan Sigala MD - Primary    Assisting Surgeon: None    Pre-op Diagnosis:  Polyp of colon, unspecified part of colon, unspecified type [K63.5]    Post-op Diagnosis:  Post-Op Diagnosis Codes:     * Polyp of colon, unspecified part of colon, unspecified type [K63.5]    Procedure(s) (LRB):  COLONOSCOPY (N/A)    Anesthesia: Choice    Description of the findings of the procedure: multiple polyps; diverticulosis; hemorrhoids    Estimated Blood Loss: * No values recorded between 9/9/2022  8:57 AM and 9/9/2022  9:28 AM *         Specimens:   Specimen (24h ago, onward)       Start     Ordered    09/09/22 0914  Specimen to Pathology, Surgery Gastrointestinal tract  Once        Comments: Pre-op Diagnosis: Polyp of colon, unspecified part of colon, unspecified type [K63.5]Procedure(s):COLONOSCOPY 1. Ascending polypectomy2. Hepatic Flexure polypectomy x 23. Descending polypectomy4. Sigmoid polypectomy x 2     References:    Click here for ordering Quick Tip   Question Answer Comment   Procedure Type: Gastrointestinal tract    Which provider would you like to cc? MILAN SIGALA    Release to patient Immediate        09/09/22 0927                    Discharge Note    SUMMARY     Admit Date: 9/9/2022    Discharge Date and Time: 9/9/2022 9:31 AM    Hospital Course Patient was seen in the preoperative area by both myself and anesthesia. All consents were verified and all questions appropriately answered. All risks, benefits and alternatives explained to patient. Patient proceeded to endoscopy suite for colonoscopy and was discharged home postoperative once cleared by anesthesia.    Final Diagnosis: Post-Op Diagnosis Codes:     * Polyp of colon, unspecified part of colon, unspecified type [K63.5]    Disposition: Home or Self Care    Follow Up/Patient Instructions: See Provation  report    Medications:  Reconciled Home Medications:      Medication List        CONTINUE taking these medications      amlodipine-valsartan  mg per tablet  Commonly known as: EXFORGE  TAKE 1 TABLET BY MOUTH EVERY DAY     cyanocobalamin 1000 MCG tablet  Commonly known as: VITAMIN B-12  Take 1 tablet (1,000 mcg total) by mouth once daily.     hydroCHLOROthiazide 25 MG tablet  Commonly known as: HYDRODIURIL  TAKE 1 TABLET BY MOUTH EVERY DAY     metoprolol succinate 200 MG 24 hr tablet  Commonly known as: TOPROL-XL  TAKE 1 TABLET BY MOUTH EVERY DAY IN THE EVENING     SYSTANE OPHT  Apply to eye.     tadalafiL 10 MG tablet  Commonly known as: CIALIS  1 tablet at least 30 minutes prior to sexual activity; not more than once daily            STOP taking these medications      SUPREP BOWEL PREP KIT 17.5-3.13-1.6 gram Solr  Generic drug: sodium,potassium,mag sulfates            Discharge Procedure Orders   Diet general     Call MD for:  temperature >100.4     Call MD for:  persistent nausea and vomiting     Call MD for:  severe uncontrolled pain     Call MD for:  difficulty breathing, headache or visual disturbances     Call MD for:  redness, tenderness, or signs of infection (pain, swelling, redness, odor or green/yellow discharge around incision site)     Call MD for:  hives     Call MD for:  persistent dizziness or light-headedness     Call MD for:  extreme fatigue     Activity as tolerated      Follow-up Information       Susan Chávez MD Follow up.    Specialty: Family Medicine  Why: As needed  Contact information:  [address] [place] [address] DR Julianna RODRIGUEZ [address] [phone]

## 2022-09-09 NOTE — ANESTHESIA POSTPROCEDURE EVALUATION
Anesthesia Post Evaluation    Patient: Neymar Victoria    Procedure(s) Performed: Procedure(s) (LRB):  COLONOSCOPY (N/A)    Final Anesthesia Type: MAC      Patient location during evaluation: PACU  Patient participation: Yes- Able to Participate  Level of consciousness: awake and alert  Post-procedure vital signs: reviewed and stable  Pain management: adequate  Airway patency: patent    PONV status at discharge: No PONV  Anesthetic complications: no      Cardiovascular status: blood pressure returned to baseline  Respiratory status: unassisted  Hydration status: euvolemic  Follow-up not needed.          Vitals Value Taken Time   /66 09/09/22 0932   Temp 98 09/09/22 0932   Pulse 66 09/09/22 0932   Resp 12 09/09/22 0932   SpO2 98 09/09/22 0932         No case tracking events are documented in the log.      Pain/Efrain Score: No data recorded

## 2022-09-09 NOTE — ANESTHESIA RELEASE NOTE
"Anesthesia Release from PACU Note    Patient: Neymar Victoria    Procedure(s) Performed: Procedure(s) (LRB):  COLONOSCOPY (N/A)    Anesthesia type: MAC    Post pain: Adequate analgesia    Post assessment: no apparent anesthetic complications    Last Vitals:   Visit Vitals  BP (!) 123/58 (BP Location: Left arm, Patient Position: Lying)   Pulse 67   Temp 36.7 °C (98.1 °F) (Temporal)   Resp 19   Ht 5' 6" (1.676 m)   Wt 79.8 kg (176 lb)   SpO2 96%   BMI 28.41 kg/m²       Post vital signs: stable    Level of consciousness: awake    Nausea/Vomiting: no nausea/no vomiting    Complications: none    Airway Patency: patent    Respiratory: unassisted    Cardiovascular: stable and blood pressure at baseline    Hydration: euvolemic  "

## 2022-09-09 NOTE — PROVATION PATIENT INSTRUCTIONS
Discharge Summary/Instructions after an Endoscopic Procedure  Patient Name: Neymar Victoria  Patient MRN: [id number]  Patient YOB: 1950 Friday, September 9, 2022 Jacobo Sigala MD  Dear patient,  As a result of recent federal legislation (The Federal Cures Act), you may   receive lab or pathology results from your procedure in your [facility]   account before your physician is able to contact you. Your physician or   their representative will relay the results to you with their   recommendations at their soonest availability.  Thank you,  RESTRICTIONS:  During your procedure today, you received medications for sedation.  These   medications may affect your judgment, balance and coordination.  Therefore,   for 24 hours, you have the following restrictions:   - DO NOT drive a car, operate machinery, make legal/financial decisions,   sign important papers or drink alcohol.    ACTIVITY:  Today: no heavy lifting, straining or running due to procedural   sedation/anesthesia.  The following day: return to full activity including work.  DIET:  Eat and drink normally unless instructed otherwise.     TREATMENT FOR COMMON SIDE EFFECTS:  - Mild abdominal pain, nausea, belching, bloating or excessive gas:  rest,   eat lightly and use a heating pad.  - Sore Throat: treat with throat lozenges and/or gargle with warm salt   water.  - Because air was used during the procedure, expelling large amounts of air   from your rectum or belching is normal.  - If a bowel prep was taken, you may not have a bowel movement for 1-3 days.    This is normal.  SYMPTOMS TO WATCH FOR AND REPORT TO YOUR PHYSICIAN:  1. Abdominal pain or bloating, other than gas cramps.  2. Chest pain.  3. Back pain.  4. Signs of infection such as: chills or fever occurring within 24 hours   after the procedure.  5. Rectal bleeding, which would show as bright red, maroon, or black stools.   (A tablespoon of blood from the rectum is not serious, especially if    hemorrhoids are present.)  6. Vomiting.  7. Weakness or dizziness.  GO DIRECTLY TO THE NEAREST EMERGENCY ROOM IF YOU HAVE ANY OF THE FOLLOWING:      Difficulty breathing              Chills and/or fever over 101 F   Persistent vomiting and/or vomiting blood   Severe abdominal pain   Severe chest pain   Black, tarry stools   Bleeding- more than one tablespoon   Any other symptom or condition that you feel may need urgent attention  Your doctor recommends these additional instructions:  If any biopsies were taken, your doctors clinic will contact you in 1 to 2   weeks with any results.  - Discharge patient to home.   - High fiber diet.   - Continue present medications.   - Await pathology results.   - Repeat colonoscopy in 3 years for surveillance.   - Return to primary care physician PRN.  For questions, problems or results please call your physician Jacobo Sigala MD at Work:  [phone]  If you have any questions about the above instructions, call the GI   department at [phone] or call the endoscopy unit at [phone]   from 7am until 3 pm.  [facility], EMERGENCY ROOM PHONE NUMBER:   [phone]  IF A COMPLICATION OR EMERGENCY SITUATION ARISES AND YOU ARE UNABLE TO REACH   YOUR PHYSICIAN - GO DIRECTLY TO THE EMERGENCY ROOM.  I have read or have had read to me these discharge instructions for my   procedure and have received a written copy.  I understand these   instructions and will follow-up with my physician if I have any questions.     __________________________________       _____________________________________  Nurse Signature                                          Patient/Designated   Responsible Party Signature  MD Jacobo Marmolejo MD  9/9/2022 9:31:40 AM  This report has been verified and signed electronically.  Dear patient,  As a result of recent federal legislation (The Federal Cures Act), you may   receive lab or pathology results  from your procedure in your [facility]   account before your physician is able to contact you. Your physician or   their representative will relay the results to you with their   recommendations at their soonest availability.  Thank you,  [employer]

## 2022-09-09 NOTE — TRANSFER OF CARE
"Anesthesia Transfer of Care Note    Patient: Neymar Victoria    Procedure(s) Performed: Procedure(s) (LRB):  COLONOSCOPY (N/A)    Patient location: PACU    Anesthesia Type: MAC    Transport from OR: Transported from OR on room air with adequate spontaneous ventilation    Post pain: adequate analgesia    Post assessment: no apparent anesthetic complications    Post vital signs: stable    Level of consciousness: awake    Nausea/Vomiting: no nausea/vomiting    Complications: none    Transfer of care protocol was followed      Last vitals:   Visit Vitals  BP (!) 123/58 (BP Location: Left arm, Patient Position: Lying)   Pulse 67   Temp 36.7 °C (98.1 °F) (Temporal)   Resp 19   Ht 5' 6" (1.676 m)   Wt 79.8 kg (176 lb)   SpO2 96%   BMI 28.41 kg/m²     "

## 2022-09-09 NOTE — ANESTHESIA PREPROCEDURE EVALUATION
09/09/2022  Neymar Victoria is a 72 y.o., male.      Pre-op Assessment    I have reviewed the Patient Summary Reports.     I have reviewed the Nursing Notes. I have reviewed the NPO Status.   I have reviewed the Medications.     Review of Systems  Anesthesia Hx:  No problems with previous Anesthesia    Social:  Smoker    Hematology/Oncology:  Hematology Normal   Oncology Normal     EENT/Dental:   chronic allergic rhinitis   Cardiovascular:   Hypertension, well controlled CAD (Aorto-iliac atherosclerosis)   hyperlipidemia    Pulmonary:  Pulmonary Normal    Renal/:  Renal/ Normal     Hepatic/GI:   Bowel Prep.    Musculoskeletal:  Musculoskeletal Normal    Neurological:   Neuromuscular Disease, (Paresthesias/numbness)    Dermatological:  Skin Normal    Psych:  Psychiatric Normal           Physical Exam  General: Well nourished    Airway:  Mallampati: II   Mouth Opening: Normal  TM Distance: Normal  Tongue: Normal  Neck ROM: Normal ROM    Dental:  Intact    Chest/Lungs:  Clear to auscultation        Anesthesia Plan  Type of Anesthesia, risks & benefits discussed:    Anesthesia Type: MAC  Intra-op Monitoring Plan: Standard ASA Monitors  Induction:  IV  Informed Consent: Informed consent signed with the Patient and all parties understand the risks and agree with anesthesia plan.  All questions answered. Patient consented to blood products? Yes  ASA Score: 3    Ready For Surgery From Anesthesia Perspective.     .

## 2022-09-13 LAB
FINAL PATHOLOGIC DIAGNOSIS: NORMAL
GROSS: NORMAL
Lab: NORMAL

## 2022-09-28 ENCOUNTER — TELEPHONE (OUTPATIENT)
Dept: ADMINISTRATIVE | Facility: HOSPITAL | Age: 72
End: 2022-09-28
Payer: MEDICARE

## 2022-11-28 ENCOUNTER — OFFICE VISIT (OUTPATIENT)
Dept: OPHTHALMOLOGY | Facility: CLINIC | Age: 72
End: 2022-11-28
Payer: MEDICARE

## 2022-11-28 DIAGNOSIS — M35.01 KERATOCONJUNCTIVITIS SICCA: Primary | ICD-10-CM

## 2022-11-28 DIAGNOSIS — Z96.1 PSEUDOPHAKIA OF BOTH EYES: ICD-10-CM

## 2022-11-28 PROCEDURE — 99999 PR PBB SHADOW E&M-EST. PATIENT-LVL II: ICD-10-PCS | Mod: PBBFAC,,, | Performed by: OPTOMETRIST

## 2022-11-28 PROCEDURE — 99203 PR OFFICE/OUTPT VISIT, NEW, LEVL III, 30-44 MIN: ICD-10-PCS | Mod: S$PBB,,, | Performed by: OPTOMETRIST

## 2022-11-28 PROCEDURE — 99999 PR PBB SHADOW E&M-EST. PATIENT-LVL II: CPT | Mod: PBBFAC,,, | Performed by: OPTOMETRIST

## 2022-11-28 PROCEDURE — 99212 OFFICE O/P EST SF 10 MIN: CPT | Mod: PBBFAC | Performed by: OPTOMETRIST

## 2022-11-28 PROCEDURE — 99203 OFFICE O/P NEW LOW 30 MIN: CPT | Mod: S$PBB,,, | Performed by: OPTOMETRIST

## 2022-11-28 NOTE — PROGRESS NOTES
HPI    NP to DKT  Patient here today for brown spot OD  Pain Scale:  0  Onset:   Last Wednesday  OD, OS, OU:   OD  Discharge:   No  A.M. Matting:  No  Itch:   No  Redness:   No  Photophobia:   No  Foreign body sensation:   No  Deep pain:   No  Previous occurrence:   No  Drops:   No    1. PCIOL OS  Last edited by Kristine Bruner, PCT on 11/28/2022  2:07 PM.            Assessment /Plan     For exam results, see Encounter Report.    Keratoconjunctivitis sicca  Small cyst noted on RUL, recommend warm compress bid with refresh preservative free tears 4-6 times daily. No FB noted on lid eversion. RTC if no improvement with current symptoms after 4 days.     Pseudophakia of both eyes  S/p CE/IOL with Dr JONES. Doing well. Observe.     RTC with Dr JONES as scheduled for annual eye exam, sooner if any changes to vision or worsening symptoms.

## 2022-11-29 ENCOUNTER — OFFICE VISIT (OUTPATIENT)
Dept: OPHTHALMOLOGY | Facility: CLINIC | Age: 72
End: 2022-11-29
Payer: MEDICARE

## 2022-11-29 DIAGNOSIS — M35.01 KERATOCONJUNCTIVITIS SICCA: ICD-10-CM

## 2022-11-29 DIAGNOSIS — H35.3131 EARLY DRY STAGE NONEXUDATIVE AGE-RELATED MACULAR DEGENERATION OF BOTH EYES: Primary | ICD-10-CM

## 2022-11-29 DIAGNOSIS — Z96.1 PSEUDOPHAKIA OF BOTH EYES: ICD-10-CM

## 2022-11-29 PROCEDURE — 99214 OFFICE O/P EST MOD 30 MIN: CPT | Mod: S$PBB,,, | Performed by: OPHTHALMOLOGY

## 2022-11-29 PROCEDURE — 99213 OFFICE O/P EST LOW 20 MIN: CPT | Mod: PBBFAC | Performed by: OPHTHALMOLOGY

## 2022-11-29 PROCEDURE — 99999 PR PBB SHADOW E&M-EST. PATIENT-LVL III: CPT | Mod: PBBFAC,,, | Performed by: OPHTHALMOLOGY

## 2022-11-29 PROCEDURE — 99214 PR OFFICE/OUTPT VISIT, EST, LEVL IV, 30-39 MIN: ICD-10-PCS | Mod: S$PBB,,, | Performed by: OPHTHALMOLOGY

## 2022-11-29 PROCEDURE — 92134 CPTRZ OPH DX IMG PST SGM RTA: CPT | Mod: PBBFAC | Performed by: OPHTHALMOLOGY

## 2022-11-29 PROCEDURE — 99999 PR PBB SHADOW E&M-EST. PATIENT-LVL III: ICD-10-PCS | Mod: PBBFAC,,, | Performed by: OPHTHALMOLOGY

## 2022-11-29 PROCEDURE — 92134 POSTERIOR SEGMENT OCT RETINA (OCULAR COHERENCE TOMOGRAPHY)-BOTH EYES: ICD-10-PCS | Mod: 26,S$PBB,, | Performed by: OPHTHALMOLOGY

## 2022-11-29 NOTE — PROGRESS NOTES
"HPI     Glaucoma Suspect            Comments: Patient reports for annual eye exam. Denies pain or irritation.   Patient states that since cataract surgery he has a "blind spot" - when   looking at stars in the janna at night, he can see stars out of peripheral   vision but when focuses straight ahead there's a blind spot where the star   should be. States that if he concentrates and blinks a few times that   things will begin to come into focus - he does not notice this issue           Comments    COAG Suspect   FXHX Macula Degeneration   PCIOL OD 6/10/21 (mal ring) / CDE: 12.50/ SN6AT4 15.5   PCIOL OS 7/8/21 (mal ring)   Dry Eyes     OU Systane PRN            Last edited by Jose Jaimes on 11/29/2022  2:43 PM.            Assessment /Plan     For exam results, see Encounter Report.      ICD-10-CM ICD-9-CM    1. Early dry stage nonexudative age-related macular degeneration of both eyes  H35.3131 362.51 Very mild - follow         2. Keratoconjunctivitis sicca  M35.01 710.2 Continue tears       3. Pseudophakia of both eyes  Z96.1 V43.1           RETURN TO CLINIC 1 year, moct                   "

## 2022-12-14 ENCOUNTER — TELEPHONE (OUTPATIENT)
Dept: ADMINISTRATIVE | Facility: HOSPITAL | Age: 72
End: 2022-12-14
Payer: MEDICARE

## 2023-01-09 ENCOUNTER — PES CALL (OUTPATIENT)
Dept: ADMINISTRATIVE | Facility: CLINIC | Age: 73
End: 2023-01-09
Payer: MEDICARE

## 2023-01-18 ENCOUNTER — LAB VISIT (OUTPATIENT)
Dept: LAB | Facility: HOSPITAL | Age: 73
End: 2023-01-18
Attending: PHYSICIAN ASSISTANT
Payer: MEDICARE

## 2023-01-18 DIAGNOSIS — E78.5 HYPERLIPIDEMIA WITH TARGET LOW DENSITY LIPOPROTEIN (LDL) CHOLESTEROL LESS THAN 160 MG/DL: Chronic | ICD-10-CM

## 2023-01-18 LAB
ALBUMIN SERPL BCP-MCNC: 4.3 G/DL (ref 3.5–5.2)
ALP SERPL-CCNC: 73 U/L (ref 55–135)
ALT SERPL W/O P-5'-P-CCNC: 33 U/L (ref 10–44)
ANION GAP SERPL CALC-SCNC: 10 MMOL/L (ref 8–16)
AST SERPL-CCNC: 25 U/L (ref 10–40)
BILIRUB SERPL-MCNC: 0.7 MG/DL (ref 0.1–1)
BUN SERPL-MCNC: 20 MG/DL (ref 8–23)
CALCIUM SERPL-MCNC: 10.5 MG/DL (ref 8.7–10.5)
CHLORIDE SERPL-SCNC: 104 MMOL/L (ref 95–110)
CHOLEST SERPL-MCNC: 171 MG/DL (ref 120–199)
CHOLEST/HDLC SERPL: 4.8 {RATIO} (ref 2–5)
CO2 SERPL-SCNC: 26 MMOL/L (ref 23–29)
CREAT SERPL-MCNC: 1.1 MG/DL (ref 0.5–1.4)
EST. GFR  (NO RACE VARIABLE): >60 ML/MIN/1.73 M^2
GLUCOSE SERPL-MCNC: 98 MG/DL (ref 70–110)
HDLC SERPL-MCNC: 36 MG/DL (ref 40–75)
HDLC SERPL: 21.1 % (ref 20–50)
LDLC SERPL CALC-MCNC: 108.8 MG/DL (ref 63–159)
NONHDLC SERPL-MCNC: 135 MG/DL
POTASSIUM SERPL-SCNC: 4.7 MMOL/L (ref 3.5–5.1)
PROT SERPL-MCNC: 7.5 G/DL (ref 6–8.4)
SODIUM SERPL-SCNC: 140 MMOL/L (ref 136–145)
TRIGL SERPL-MCNC: 131 MG/DL (ref 30–150)

## 2023-01-18 PROCEDURE — 80061 LIPID PANEL: CPT | Performed by: PHYSICIAN ASSISTANT

## 2023-01-18 PROCEDURE — 80053 COMPREHEN METABOLIC PANEL: CPT | Performed by: PHYSICIAN ASSISTANT

## 2023-01-18 PROCEDURE — 36415 COLL VENOUS BLD VENIPUNCTURE: CPT | Performed by: PHYSICIAN ASSISTANT

## 2023-01-25 ENCOUNTER — OFFICE VISIT (OUTPATIENT)
Dept: INTERNAL MEDICINE | Facility: CLINIC | Age: 73
End: 2023-01-25
Payer: MEDICARE

## 2023-01-25 VITALS
TEMPERATURE: 98 F | BODY MASS INDEX: 29.28 KG/M2 | SYSTOLIC BLOOD PRESSURE: 128 MMHG | HEART RATE: 60 BPM | WEIGHT: 182.19 LBS | OXYGEN SATURATION: 95 % | DIASTOLIC BLOOD PRESSURE: 62 MMHG | HEIGHT: 66 IN

## 2023-01-25 DIAGNOSIS — I70.0 AORTO-ILIAC ATHEROSCLEROSIS: ICD-10-CM

## 2023-01-25 DIAGNOSIS — I10 ESSENTIAL HYPERTENSION: Primary | ICD-10-CM

## 2023-01-25 DIAGNOSIS — M60.9 STATIN-INDUCED MYOSITIS: ICD-10-CM

## 2023-01-25 DIAGNOSIS — M35.01 KERATOCONJUNCTIVITIS SICCA: ICD-10-CM

## 2023-01-25 DIAGNOSIS — E53.8 VITAMIN B12 DEFICIENCY: ICD-10-CM

## 2023-01-25 DIAGNOSIS — N52.9 ERECTILE DYSFUNCTION, UNSPECIFIED ERECTILE DYSFUNCTION TYPE: ICD-10-CM

## 2023-01-25 DIAGNOSIS — I70.8 AORTO-ILIAC ATHEROSCLEROSIS: ICD-10-CM

## 2023-01-25 DIAGNOSIS — E78.5 HYPERLIPIDEMIA WITH TARGET LOW DENSITY LIPOPROTEIN (LDL) CHOLESTEROL LESS THAN 160 MG/DL: ICD-10-CM

## 2023-01-25 DIAGNOSIS — Z72.0 TOBACCO USE: ICD-10-CM

## 2023-01-25 DIAGNOSIS — T46.6X5A STATIN-INDUCED MYOSITIS: ICD-10-CM

## 2023-01-25 PROBLEM — H16.229 KERATOCONJUNCTIVITIS SICCA: Status: ACTIVE | Noted: 2023-01-25

## 2023-01-25 PROCEDURE — 99214 PR OFFICE/OUTPT VISIT, EST, LEVL IV, 30-39 MIN: ICD-10-PCS | Mod: S$PBB,,, | Performed by: FAMILY MEDICINE

## 2023-01-25 PROCEDURE — 99999 PR PBB SHADOW E&M-EST. PATIENT-LVL IV: CPT | Mod: PBBFAC,,, | Performed by: FAMILY MEDICINE

## 2023-01-25 PROCEDURE — 99999 PR PBB SHADOW E&M-EST. PATIENT-LVL IV: ICD-10-PCS | Mod: PBBFAC,,, | Performed by: FAMILY MEDICINE

## 2023-01-25 PROCEDURE — 99214 OFFICE O/P EST MOD 30 MIN: CPT | Mod: S$PBB,,, | Performed by: FAMILY MEDICINE

## 2023-01-25 PROCEDURE — 99214 OFFICE O/P EST MOD 30 MIN: CPT | Mod: PBBFAC | Performed by: FAMILY MEDICINE

## 2023-01-25 NOTE — ASSESSMENT & PLAN NOTE
At goal    Lab Results   Component Value Date    CHOL 171 01/18/2023    LDLCALC 108.8 01/18/2023    TRIG 131 01/18/2023    HDL 36 (L) 01/18/2023    ALT 33 01/18/2023    AST 25 01/18/2023    ALKPHOS 73 01/18/2023

## 2023-01-25 NOTE — PROGRESS NOTES
Subjective:       Patient ID: Neymar Victoria is a 72 y.o. male.    Chief Complaint: Follow-up (6 mo f/u)    Patient presents to clinic today for followup of chronic conditions. Reports intermittent nasal congestion over the last week, flonase helps. No other associated symptoms. Patient is otherwise without concerns today.    Review of Systems   Constitutional:  Negative for chills, fatigue, fever and unexpected weight change.   Eyes:  Negative for visual disturbance.   Respiratory:  Negative for shortness of breath.    Cardiovascular:  Negative for chest pain.   Musculoskeletal:  Negative for myalgias.   Neurological:  Negative for headaches.       Objective:      Physical Exam  Vitals reviewed.   Constitutional:       General: He is not in acute distress.     Appearance: He is well-developed.   HENT:      Head: Normocephalic and atraumatic.   Eyes:      General: Lids are normal. No scleral icterus.     Extraocular Movements: Extraocular movements intact.      Conjunctiva/sclera: Conjunctivae normal.      Pupils: Pupils are equal, round, and reactive to light.   Pulmonary:      Effort: Pulmonary effort is normal.   Neurological:      Mental Status: He is alert and oriented to person, place, and time.      Cranial Nerves: No cranial nerve deficit.      Gait: Gait normal.   Psychiatric:         Mood and Affect: Mood and affect normal.       Assessment:       1. Essential hypertension    2. Hyperlipidemia with target low density lipoprotein (LDL) cholesterol less than 160 mg/dL    3. Vitamin B12 deficiency    4. Statin-induced myositis    5. Tobacco use    6. Keratoconjunctivitis sicca    7. Aorto-iliac atherosclerosis    8. Erectile dysfunction, unspecified erectile dysfunction type        Plan:   1. Essential hypertension  Assessment & Plan:  Controlled, continue hctz, metoprolol and amlodipine-valsartan    Orders:  -     TSH; Future; Expected date: 07/25/2023  -     CBC Auto Differential; Future; Expected date:  07/25/2023    2. Hyperlipidemia with target low density lipoprotein (LDL) cholesterol less than 160 mg/dL  Assessment & Plan:  At goal    Lab Results   Component Value Date    CHOL 171 01/18/2023    LDLCALC 108.8 01/18/2023    TRIG 131 01/18/2023    HDL 36 (L) 01/18/2023    ALT 33 01/18/2023    AST 25 01/18/2023    ALKPHOS 73 01/18/2023         Orders:  -     Comprehensive Metabolic Panel; Future; Expected date: 07/25/2023  -     Lipid Panel; Future; Expected date: 07/25/2023    3. Vitamin B12 deficiency  Assessment & Plan:  Status pending lab, continue supplement     Orders:  -     Vitamin B12; Future; Expected date: 07/25/2023    4. Statin-induced myositis    5. Tobacco use  Overview:  Started around age 32    Assessment & Plan:  Reports he has cutdown to 6 cigarettes per day; encouraged cessation      6. Keratoconjunctivitis sicca  Overview:  Followed by Ophthalmology, continue current treatment plan        7. Aorto-iliac atherosclerosis  Overview:  US 4/2018    Assessment & Plan:  Blood pressure and cholesterol controlled; statin intolerant      8. Erectile dysfunction, unspecified erectile dysfunction type  Assessment & Plan:  Stable, cialis prn          Health Maintenance reviewed/updated.

## 2023-01-25 NOTE — PATIENT INSTRUCTIONS
"Check with your pharmacist regarding Tdap (tetanus/diphtheria/pertussis) vaccine.     Check with your pharmacist regarding shingrix vaccine.     The bivalent covid booster is now available. You can schedule an appointment for the vaccine through [facility] or ask  to assist you with scheduling an appointment for the vaccine.    The bivalent vaccines, known as the "Omicron" vaccines, target both the original strain of COVID-19 as well as the Omicron variant, which is now the predominant strain in the [place].    The Omicron booster is authorized for individuals 12 years and older and is given two months after last dose of primary or booster shot.   "

## 2023-03-07 ENCOUNTER — TELEPHONE (OUTPATIENT)
Dept: ADMINISTRATIVE | Facility: HOSPITAL | Age: 73
End: 2023-03-07
Payer: MEDICARE

## 2023-04-27 ENCOUNTER — TELEPHONE (OUTPATIENT)
Dept: ADMINISTRATIVE | Facility: HOSPITAL | Age: 73
End: 2023-04-27
Payer: MEDICARE

## 2023-05-04 ENCOUNTER — TELEPHONE (OUTPATIENT)
Dept: ADMINISTRATIVE | Facility: HOSPITAL | Age: 73
End: 2023-05-04
Payer: MEDICARE

## 2023-05-26 ENCOUNTER — PES CALL (OUTPATIENT)
Dept: ADMINISTRATIVE | Facility: CLINIC | Age: 73
End: 2023-05-26
Payer: MEDICARE

## 2023-06-07 ENCOUNTER — PES CALL (OUTPATIENT)
Dept: ADMINISTRATIVE | Facility: CLINIC | Age: 73
End: 2023-06-07
Payer: MEDICARE

## 2023-07-06 DIAGNOSIS — I10 ESSENTIAL HYPERTENSION: ICD-10-CM

## 2023-07-06 RX ORDER — HYDROCHLOROTHIAZIDE 25 MG/1
TABLET ORAL
Qty: 90 TABLET | Refills: 1 | Status: SHIPPED | OUTPATIENT
Start: 2023-07-06 | End: 2024-01-02

## 2023-07-06 NOTE — TELEPHONE ENCOUNTER
No care due was identified.  [facility] Embedded Care Due Messages. Reference number: [id number].   7/06/2023 12:16:28 AM CDT

## 2023-07-06 NOTE — TELEPHONE ENCOUNTER
Refill Decision Note   Neymar Victoria  is requesting a refill authorization.  Brief Assessment and Rationale for Refill:  Approve     Medication Therapy Plan:       Medication Reconciliation Completed: No   Comments:     No Care Gaps recommended.     Note composed:8:53 AM 07/06/2023

## 2023-07-18 ENCOUNTER — LAB VISIT (OUTPATIENT)
Dept: LAB | Facility: HOSPITAL | Age: 73
End: 2023-07-18
Attending: FAMILY MEDICINE
Payer: MEDICARE

## 2023-07-18 DIAGNOSIS — E53.8 VITAMIN B12 DEFICIENCY: ICD-10-CM

## 2023-07-18 DIAGNOSIS — I10 ESSENTIAL HYPERTENSION: ICD-10-CM

## 2023-07-18 DIAGNOSIS — E78.5 HYPERLIPIDEMIA WITH TARGET LOW DENSITY LIPOPROTEIN (LDL) CHOLESTEROL LESS THAN 160 MG/DL: ICD-10-CM

## 2023-07-18 LAB
ALBUMIN SERPL BCP-MCNC: 4.1 G/DL (ref 3.5–5.2)
ALP SERPL-CCNC: 65 U/L (ref 55–135)
ALT SERPL W/O P-5'-P-CCNC: 27 U/L (ref 10–44)
ANION GAP SERPL CALC-SCNC: 14 MMOL/L (ref 8–16)
AST SERPL-CCNC: 17 U/L (ref 10–40)
BASOPHILS # BLD AUTO: 0.04 K/UL (ref 0–0.2)
BASOPHILS NFR BLD: 0.4 % (ref 0–1.9)
BILIRUB SERPL-MCNC: 0.6 MG/DL (ref 0.1–1)
BUN SERPL-MCNC: 21 MG/DL (ref 8–23)
CALCIUM SERPL-MCNC: 9.9 MG/DL (ref 8.7–10.5)
CHLORIDE SERPL-SCNC: 106 MMOL/L (ref 95–110)
CHOLEST SERPL-MCNC: 184 MG/DL (ref 120–199)
CHOLEST/HDLC SERPL: 5.1 {RATIO} (ref 2–5)
CO2 SERPL-SCNC: 23 MMOL/L (ref 23–29)
CREAT SERPL-MCNC: 1 MG/DL (ref 0.5–1.4)
DIFFERENTIAL METHOD: NORMAL
EOSINOPHIL # BLD AUTO: 0.2 K/UL (ref 0–0.5)
EOSINOPHIL NFR BLD: 2 % (ref 0–8)
ERYTHROCYTE [DISTWIDTH] IN BLOOD BY AUTOMATED COUNT: 13.8 % (ref 11.5–14.5)
EST. GFR  (NO RACE VARIABLE): >60 ML/MIN/1.73 M^2
GLUCOSE SERPL-MCNC: 93 MG/DL (ref 70–110)
HCT VFR BLD AUTO: 47.6 % (ref 40–54)
HDLC SERPL-MCNC: 36 MG/DL (ref 40–75)
HDLC SERPL: 19.6 % (ref 20–50)
HGB BLD-MCNC: 15.3 G/DL (ref 14–18)
IMM GRANULOCYTES # BLD AUTO: 0.03 K/UL (ref 0–0.04)
IMM GRANULOCYTES NFR BLD AUTO: 0.3 % (ref 0–0.5)
LDLC SERPL CALC-MCNC: 122.4 MG/DL (ref 63–159)
LYMPHOCYTES # BLD AUTO: 2.2 K/UL (ref 1–4.8)
LYMPHOCYTES NFR BLD: 20.2 % (ref 18–48)
MCH RBC QN AUTO: 30.5 PG (ref 27–31)
MCHC RBC AUTO-ENTMCNC: 32.1 G/DL (ref 32–36)
MCV RBC AUTO: 95 FL (ref 82–98)
MONOCYTES # BLD AUTO: 0.8 K/UL (ref 0.3–1)
MONOCYTES NFR BLD: 7.8 % (ref 4–15)
NEUTROPHILS # BLD AUTO: 7.4 K/UL (ref 1.8–7.7)
NEUTROPHILS NFR BLD: 69.3 % (ref 38–73)
NONHDLC SERPL-MCNC: 148 MG/DL
NRBC BLD-RTO: 0 /100 WBC
PLATELET # BLD AUTO: 236 K/UL (ref 150–450)
PMV BLD AUTO: 10.5 FL (ref 9.2–12.9)
POTASSIUM SERPL-SCNC: 4.2 MMOL/L (ref 3.5–5.1)
PROT SERPL-MCNC: 7.2 G/DL (ref 6–8.4)
RBC # BLD AUTO: 5.01 M/UL (ref 4.6–6.2)
SODIUM SERPL-SCNC: 143 MMOL/L (ref 136–145)
TRIGL SERPL-MCNC: 128 MG/DL (ref 30–150)
TSH SERPL DL<=0.005 MIU/L-ACNC: 2.23 UIU/ML (ref 0.4–4)
VIT B12 SERPL-MCNC: 1196 PG/ML (ref 210–950)
WBC # BLD AUTO: 10.69 K/UL (ref 3.9–12.7)

## 2023-07-18 PROCEDURE — 36415 COLL VENOUS BLD VENIPUNCTURE: CPT | Performed by: FAMILY MEDICINE

## 2023-07-18 PROCEDURE — 80061 LIPID PANEL: CPT | Performed by: FAMILY MEDICINE

## 2023-07-18 PROCEDURE — 84443 ASSAY THYROID STIM HORMONE: CPT | Performed by: FAMILY MEDICINE

## 2023-07-18 PROCEDURE — 85025 COMPLETE CBC W/AUTO DIFF WBC: CPT | Performed by: FAMILY MEDICINE

## 2023-07-18 PROCEDURE — 80053 COMPREHEN METABOLIC PANEL: CPT | Performed by: FAMILY MEDICINE

## 2023-07-18 PROCEDURE — 82607 VITAMIN B-12: CPT | Performed by: FAMILY MEDICINE

## 2023-07-24 PROBLEM — G72.0 DRUG-INDUCED MYOPATHY: Status: ACTIVE | Noted: 2022-04-12

## 2023-07-25 ENCOUNTER — OFFICE VISIT (OUTPATIENT)
Dept: INTERNAL MEDICINE | Facility: CLINIC | Age: 73
End: 2023-07-25
Payer: MEDICARE

## 2023-07-25 VITALS
HEART RATE: 53 BPM | BODY MASS INDEX: 29.3 KG/M2 | SYSTOLIC BLOOD PRESSURE: 132 MMHG | TEMPERATURE: 96 F | WEIGHT: 182.31 LBS | HEIGHT: 66 IN | OXYGEN SATURATION: 96 % | DIASTOLIC BLOOD PRESSURE: 68 MMHG

## 2023-07-25 DIAGNOSIS — M35.01 KERATOCONJUNCTIVITIS SICCA: Primary | ICD-10-CM

## 2023-07-25 DIAGNOSIS — I10 ESSENTIAL HYPERTENSION: Chronic | ICD-10-CM

## 2023-07-25 DIAGNOSIS — I70.0 AORTO-ILIAC ATHEROSCLEROSIS: ICD-10-CM

## 2023-07-25 DIAGNOSIS — E78.5 HYPERLIPIDEMIA WITH TARGET LOW DENSITY LIPOPROTEIN (LDL) CHOLESTEROL LESS THAN 160 MG/DL: Chronic | ICD-10-CM

## 2023-07-25 DIAGNOSIS — G72.0 DRUG-INDUCED MYOPATHY: ICD-10-CM

## 2023-07-25 DIAGNOSIS — I70.8 AORTO-ILIAC ATHEROSCLEROSIS: ICD-10-CM

## 2023-07-25 DIAGNOSIS — E53.8 VITAMIN B12 DEFICIENCY: ICD-10-CM

## 2023-07-25 PROCEDURE — 99214 OFFICE O/P EST MOD 30 MIN: CPT | Mod: PBBFAC | Performed by: PHYSICIAN ASSISTANT

## 2023-07-25 PROCEDURE — 99214 OFFICE O/P EST MOD 30 MIN: CPT | Mod: S$PBB,,, | Performed by: PHYSICIAN ASSISTANT

## 2023-07-25 PROCEDURE — 99999 PR PBB SHADOW E&M-EST. PATIENT-LVL IV: CPT | Mod: PBBFAC,,, | Performed by: PHYSICIAN ASSISTANT

## 2023-07-25 PROCEDURE — 99999 PR PBB SHADOW E&M-EST. PATIENT-LVL IV: ICD-10-PCS | Mod: PBBFAC,,, | Performed by: PHYSICIAN ASSISTANT

## 2023-07-25 PROCEDURE — 99214 PR OFFICE/OUTPT VISIT, EST, LEVL IV, 30-39 MIN: ICD-10-PCS | Mod: S$PBB,,, | Performed by: PHYSICIAN ASSISTANT

## 2023-07-25 RX ORDER — METOPROLOL SUCCINATE 200 MG/1
200 TABLET, EXTENDED RELEASE ORAL NIGHTLY
Qty: 90 TABLET | Refills: 3 | Status: SHIPPED | OUTPATIENT
Start: 2023-07-25

## 2023-07-25 NOTE — PATIENT INSTRUCTIONS
Check with your pharmacist regarding Tdap (tetanus/diphtheria/pertussis) vaccine.     Check with your pharmacist regarding shingrix vaccine.

## 2023-07-25 NOTE — ASSESSMENT & PLAN NOTE
Stable, not on meds, statin intolerant  Lab Results   Component Value Date    CHOL 184 07/18/2023    LDLCALC 122.4 07/18/2023    TRIG 128 07/18/2023    HDL 36 (L) 07/18/2023    ALT 27 07/18/2023    AST 17 07/18/2023    ALKPHOS 65 07/18/2023

## 2023-07-25 NOTE — PROGRESS NOTES
Subjective:       Patient ID: Neymar Victoria is a 73 y.o. male.    Chief Complaint: Annual Exam      Patient presents to clinic today for annual physical exam.      Review of Systems   Constitutional:  Negative for chills, fatigue, fever and unexpected weight change.   HENT:  Positive for hearing loss (chronic). Negative for congestion, dental problem, ear pain, rhinorrhea and trouble swallowing.    Eyes:  Negative for pain and visual disturbance.   Respiratory:  Negative for cough and shortness of breath.    Cardiovascular:  Negative for chest pain, palpitations and leg swelling.   Gastrointestinal:  Negative for abdominal distention, abdominal pain, blood in stool, constipation, diarrhea, nausea and vomiting.   Genitourinary:  Negative for difficulty urinating, scrotal swelling and testicular pain.   Musculoskeletal:  Positive for arthralgias (chronic). Negative for myalgias.   Skin:  Negative for rash.   Neurological:  Negative for dizziness, weakness, numbness and headaches.   Hematological:  Negative for adenopathy. Does not bruise/bleed easily.   Psychiatric/Behavioral:  Negative for dysphoric mood and sleep disturbance. The patient is not nervous/anxious.      Objective:      Physical Exam  Vitals and nursing note reviewed.   Constitutional:       General: He is not in acute distress.     Appearance: He is well-developed.   HENT:      Head: Normocephalic and atraumatic.      Right Ear: Tympanic membrane, ear canal and external ear normal.      Left Ear: Tympanic membrane, ear canal and external ear normal.      Nose: Nose normal.      Mouth/Throat:      Lips: Pink.      Mouth: Mucous membranes are moist.      Pharynx: Oropharynx is clear. Uvula midline.   Eyes:      General: Lids are normal. No scleral icterus.     Conjunctiva/sclera: Conjunctivae normal.      Pupils: Pupils are equal, round, and reactive to light.   Neck:      Thyroid: No thyromegaly.   Cardiovascular:      Rate and Rhythm: Normal rate and  regular rhythm.      Pulses: Normal pulses.   Pulmonary:      Effort: Pulmonary effort is normal.      Breath sounds: Normal breath sounds. No wheezing or rales.   Musculoskeletal:         General: No tenderness. Normal range of motion.      Cervical back: Normal range of motion and neck supple.      Right lower leg: No edema.      Left lower leg: No edema.   Lymphadenopathy:      Cervical: No cervical adenopathy.   Skin:     General: Skin is warm and dry.      Findings: No rash.   Neurological:      Mental Status: He is alert.      Cranial Nerves: No cranial nerve deficit.   Psychiatric:         Mood and Affect: Mood and affect normal.       Assessment:       1. Keratoconjunctivitis sicca    2. Aorto-iliac atherosclerosis    3. Essential hypertension    4. Hyperlipidemia with target low density lipoprotein (LDL) cholesterol less than 160 mg/dL    5. Vitamin B12 deficiency    6. Drug-induced myopathy        Plan:   1. Keratoconjunctivitis sicca  Overview:  Followed by Ophthalmology, continue current treatment plan      2. Aorto-iliac atherosclerosis  Overview:   4/2018, statin intolerant, continue to monitor      3. Essential hypertension  Assessment & Plan:  /68, controlled, continue amlodipine-valsartan, metoprolol, HCTZ  Lab Results   Component Value Date     07/18/2023    K 4.2 07/18/2023    BUN 21 07/18/2023    CREATININE 1.0 07/18/2023    ESTGFRAFRICA >60.0 07/18/2022    EGFRNONAA >60.0 07/18/2022    EGFRNORACEVR >60.0 07/18/2023        Orders:  -     metoprolol succinate (TOPROL-XL) 200 MG 24 hr tablet; Take 1 tablet (200 mg total) by mouth every evening.  Dispense: 90 tablet; Refill: 3    4. Hyperlipidemia with target low density lipoprotein (LDL) cholesterol less than 160 mg/dL  Assessment & Plan:  Stable, not on meds, statin intolerant  Lab Results   Component Value Date    CHOL 184 07/18/2023    LDLCALC 122.4 07/18/2023    TRIG 128 07/18/2023    HDL 36 (L) 07/18/2023    ALT 27 07/18/2023     AST 17 07/18/2023    ALKPHOS 65 07/18/2023        Orders:  -     Comprehensive Metabolic Panel; Future; Expected date: 01/24/2024  -     Lipid Panel; Future; Expected date: 01/24/2024    5. Vitamin B12 deficiency  Assessment & Plan:  Continue supplement      6. Drug-induced myopathy        Recent labs reviewed with patient.    Discussed shingrix and Tdap vaccines, advised can be obtained at pharmacy.  6 month f/u with Dr. Chávez scheduled with fasting labs PTA   Health Maintenance reviewed/updated.

## 2023-07-25 NOTE — ASSESSMENT & PLAN NOTE
/68, controlled, continue amlodipine-valsartan, metoprolol, HCTZ  Lab Results   Component Value Date     07/18/2023    K 4.2 07/18/2023    BUN 21 07/18/2023    CREATININE 1.0 07/18/2023    ESTGFRAFRICA >60.0 07/18/2022    EGFRNONAA >60.0 07/18/2022    EGFRNORACEVR >60.0 07/18/2023

## 2023-12-31 DIAGNOSIS — I10 ESSENTIAL HYPERTENSION: ICD-10-CM

## 2023-12-31 NOTE — TELEPHONE ENCOUNTER
No care due was identified.  [facility] Embedded Care Due Messages. Reference number: [id number].   12/31/2023 11:28:58 AM CST

## 2024-01-02 RX ORDER — HYDROCHLOROTHIAZIDE 25 MG/1
TABLET ORAL
Qty: 90 TABLET | Refills: 0 | Status: SHIPPED | OUTPATIENT
Start: 2024-01-02

## 2024-01-02 NOTE — TELEPHONE ENCOUNTER
Refill Decision Note   Neymar Victoria  is requesting a refill authorization.  Brief Assessment and Rationale for Refill:  Approve     Medication Therapy Plan:         Comments:     Note composed:9:59 AM 01/02/2024

## 2024-01-18 ENCOUNTER — LAB VISIT (OUTPATIENT)
Dept: LAB | Facility: HOSPITAL | Age: 74
End: 2024-01-18
Attending: PHYSICIAN ASSISTANT
Payer: MEDICARE

## 2024-01-18 DIAGNOSIS — E78.5 HYPERLIPIDEMIA WITH TARGET LOW DENSITY LIPOPROTEIN (LDL) CHOLESTEROL LESS THAN 160 MG/DL: Chronic | ICD-10-CM

## 2024-01-18 LAB
ALBUMIN SERPL BCP-MCNC: 3.9 G/DL (ref 3.5–5.2)
ALP SERPL-CCNC: 71 U/L (ref 55–135)
ALT SERPL W/O P-5'-P-CCNC: 26 U/L (ref 10–44)
ANION GAP SERPL CALC-SCNC: 8 MMOL/L (ref 8–16)
AST SERPL-CCNC: 21 U/L (ref 10–40)
BILIRUB SERPL-MCNC: 0.4 MG/DL (ref 0.1–1)
BUN SERPL-MCNC: 29 MG/DL (ref 8–23)
CALCIUM SERPL-MCNC: 9.6 MG/DL (ref 8.7–10.5)
CHLORIDE SERPL-SCNC: 108 MMOL/L (ref 95–110)
CHOLEST SERPL-MCNC: 155 MG/DL (ref 120–199)
CHOLEST/HDLC SERPL: 4.3 {RATIO} (ref 2–5)
CO2 SERPL-SCNC: 26 MMOL/L (ref 23–29)
CREAT SERPL-MCNC: 0.9 MG/DL (ref 0.5–1.4)
EST. GFR  (NO RACE VARIABLE): >60 ML/MIN/1.73 M^2
GLUCOSE SERPL-MCNC: 100 MG/DL (ref 70–110)
HDLC SERPL-MCNC: 36 MG/DL (ref 40–75)
HDLC SERPL: 23.2 % (ref 20–50)
LDLC SERPL CALC-MCNC: 102 MG/DL (ref 63–159)
NONHDLC SERPL-MCNC: 119 MG/DL
POTASSIUM SERPL-SCNC: 3.8 MMOL/L (ref 3.5–5.1)
PROT SERPL-MCNC: 6.9 G/DL (ref 6–8.4)
SODIUM SERPL-SCNC: 142 MMOL/L (ref 136–145)
TRIGL SERPL-MCNC: 85 MG/DL (ref 30–150)

## 2024-01-18 PROCEDURE — 80061 LIPID PANEL: CPT | Performed by: PHYSICIAN ASSISTANT

## 2024-01-18 PROCEDURE — 36415 COLL VENOUS BLD VENIPUNCTURE: CPT | Mod: PO | Performed by: PHYSICIAN ASSISTANT

## 2024-01-18 PROCEDURE — 80053 COMPREHEN METABOLIC PANEL: CPT | Performed by: PHYSICIAN ASSISTANT

## 2024-01-30 ENCOUNTER — OFFICE VISIT (OUTPATIENT)
Dept: INTERNAL MEDICINE | Facility: CLINIC | Age: 74
End: 2024-01-30
Payer: MEDICARE

## 2024-01-30 VITALS
OXYGEN SATURATION: 95 % | HEART RATE: 65 BPM | DIASTOLIC BLOOD PRESSURE: 68 MMHG | HEIGHT: 66 IN | TEMPERATURE: 98 F | BODY MASS INDEX: 28.98 KG/M2 | WEIGHT: 180.31 LBS | RESPIRATION RATE: 18 BRPM | SYSTOLIC BLOOD PRESSURE: 134 MMHG

## 2024-01-30 DIAGNOSIS — E53.8 VITAMIN B12 DEFICIENCY: ICD-10-CM

## 2024-01-30 DIAGNOSIS — I10 ESSENTIAL HYPERTENSION: Primary | ICD-10-CM

## 2024-01-30 DIAGNOSIS — I70.8 AORTO-ILIAC ATHEROSCLEROSIS: ICD-10-CM

## 2024-01-30 DIAGNOSIS — I70.0 AORTO-ILIAC ATHEROSCLEROSIS: ICD-10-CM

## 2024-01-30 DIAGNOSIS — M35.01 KERATOCONJUNCTIVITIS SICCA: ICD-10-CM

## 2024-01-30 DIAGNOSIS — E78.5 HYPERLIPIDEMIA WITH TARGET LOW DENSITY LIPOPROTEIN (LDL) CHOLESTEROL LESS THAN 160 MG/DL: ICD-10-CM

## 2024-01-30 PROCEDURE — 99999 PR PBB SHADOW E&M-EST. PATIENT-LVL IV: CPT | Mod: PBBFAC,,, | Performed by: FAMILY MEDICINE

## 2024-01-30 PROCEDURE — 99214 OFFICE O/P EST MOD 30 MIN: CPT | Mod: S$PBB,,, | Performed by: FAMILY MEDICINE

## 2024-01-30 PROCEDURE — 99214 OFFICE O/P EST MOD 30 MIN: CPT | Mod: PBBFAC | Performed by: FAMILY MEDICINE

## 2024-01-30 NOTE — ASSESSMENT & PLAN NOTE
Offered referral to Cardiology to consider other treatment options; declines at this time    The 10-year ASCVD risk score (Mckenzie GALLO, et al., 2019) is: 28.5%    Values used to calculate the score:      Age: 73 years      Sex: Male      Is Non- : No      Diabetic: No      Tobacco smoker: Yes      Systolic Blood Pressure: 134 mmHg      Is BP treated: Yes      HDL Cholesterol: 36 mg/dL      Total Cholesterol: 155 mg/dL

## 2024-01-30 NOTE — PROGRESS NOTES
Subjective:       Patient ID: Neymar Victoria is a 73 y.o. male.    Chief Complaint: Follow-up    Patient presents to clinic today for followup of chronic conditions.      Review of Systems   Constitutional:  Negative for chills, fatigue, fever and unexpected weight change.   Eyes:  Negative for visual disturbance.   Respiratory:  Negative for shortness of breath.    Cardiovascular:  Negative for chest pain.   Musculoskeletal:  Negative for myalgias.   Neurological:  Negative for headaches.         Objective:      Physical Exam  Vitals reviewed.   Constitutional:       General: He is not in acute distress.     Appearance: He is well-developed.   HENT:      Head: Normocephalic and atraumatic.   Eyes:      General: Lids are normal. No scleral icterus.     Extraocular Movements: Extraocular movements intact.      Conjunctiva/sclera: Conjunctivae normal.      Pupils: Pupils are equal, round, and reactive to light.   Pulmonary:      Effort: Pulmonary effort is normal.   Neurological:      Mental Status: He is alert and oriented to person, place, and time.      Cranial Nerves: No cranial nerve deficit.      Gait: Gait normal.   Psychiatric:         Mood and Affect: Mood and affect normal.         Assessment:       1. Essential hypertension    2. Hyperlipidemia with target low density lipoprotein (LDL) cholesterol less than 160 mg/dL    3. Vitamin B12 deficiency    4. Keratoconjunctivitis sicca    5. Aorto-iliac atherosclerosis        Plan:   1. Essential hypertension  Assessment & Plan:  Controlled, continue amlodipine-valsartan, hydrochlorothiazide, and metoprolol    Orders:  -     TSH; Future; Expected date: 07/30/2024  -     CBC Auto Differential; Future; Expected date: 07/30/2024    2. Hyperlipidemia with target low density lipoprotein (LDL) cholesterol less than 160 mg/dL  Assessment & Plan:  Stable    Lab Results   Component Value Date    CHOL 155 01/18/2024    LDLCALC 102.0 01/18/2024    TRIG 85 01/18/2024    HDL 36  (L) 01/18/2024    ALT 26 01/18/2024    AST 21 01/18/2024    ALKPHOS 71 01/18/2024         Orders:  -     Comprehensive Metabolic Panel; Future; Expected date: 07/30/2024  -     Lipid Panel; Future; Expected date: 07/30/2024    3. Vitamin B12 deficiency  -     Vitamin B12; Future; Expected date: 07/30/2024    4. Keratoconjunctivitis sicca  Overview:  Followed by Ophthalmology, continue current treatment plan      5. Aorto-iliac atherosclerosis  Overview:  [place] 4/2018, statin intolerant, continue to monitor    Assessment & Plan:  Offered referral to Cardiology to consider other treatment options; declines at this time    The 10-year ASCVD risk score (Mckenzie GALLO, et al., 2019) is: 28.5%    Values used to calculate the score:      Age: 73 years      Sex: Male      Is Non- : No      Diabetic: No      Tobacco smoker: Yes      Systolic Blood Pressure: 134 mmHg      Is BP treated: Yes      HDL Cholesterol: 36 mg/dL      Total Cholesterol: 155 mg/dL          Health Maintenance reviewed/updated.

## 2024-01-30 NOTE — PATIENT INSTRUCTIONS
Check with your pharmacist regarding RSV vaccine.      Check with your pharmacist regarding shingrix vaccine.     Check with your pharmacist regarding Tdap (tetanus/diphtheria/pertussis) vaccine.

## 2024-01-30 NOTE — ASSESSMENT & PLAN NOTE
Stable    Lab Results   Component Value Date    CHOL 155 01/18/2024    LDLCALC 102.0 01/18/2024    TRIG 85 01/18/2024    HDL 36 (L) 01/18/2024    ALT 26 01/18/2024    AST 21 01/18/2024    ALKPHOS 71 01/18/2024

## 2024-02-25 DIAGNOSIS — I10 ESSENTIAL HYPERTENSION: ICD-10-CM

## 2024-02-25 RX ORDER — AMLODIPINE AND VALSARTAN 10; 160 MG/1; MG/1
TABLET ORAL
Qty: 90 TABLET | Refills: 3 | Status: SHIPPED | OUTPATIENT
Start: 2024-02-25

## 2024-02-25 NOTE — TELEPHONE ENCOUNTER
No care due was identified.  [facility] Embedded Care Due Messages. Reference number: [id number].   2/25/2024 7:01:11 AM CST

## 2024-02-25 NOTE — TELEPHONE ENCOUNTER
Refill Decision Note   Neymar Victoria  is requesting a refill authorization.  Brief Assessment and Rationale for Refill:  Approve     Medication Therapy Plan:         Comments:     Note composed:10:19 AM 02/25/2024             Appointments     Last Visit   1/30/2024 Susan Chávez MD   Next Visit   Visit date not found Susan Chávez MD

## 2024-03-18 ENCOUNTER — OFFICE VISIT (OUTPATIENT)
Dept: OPHTHALMOLOGY | Facility: CLINIC | Age: 74
End: 2024-03-18
Payer: MEDICARE

## 2024-03-18 DIAGNOSIS — H52.7 REFRACTIVE ERROR: ICD-10-CM

## 2024-03-18 DIAGNOSIS — H35.3131 EARLY DRY STAGE NONEXUDATIVE AGE-RELATED MACULAR DEGENERATION OF BOTH EYES: Primary | ICD-10-CM

## 2024-03-18 DIAGNOSIS — H43.811 PVD (POSTERIOR VITREOUS DETACHMENT), RIGHT: ICD-10-CM

## 2024-03-18 DIAGNOSIS — M35.01 KERATOCONJUNCTIVITIS SICCA: ICD-10-CM

## 2024-03-18 DIAGNOSIS — Z96.1 PSEUDOPHAKIA OF BOTH EYES: ICD-10-CM

## 2024-03-18 PROCEDURE — 99999 PR PBB SHADOW E&M-EST. PATIENT-LVL III: CPT | Mod: PBBFAC,,, | Performed by: OPHTHALMOLOGY

## 2024-03-18 PROCEDURE — 99213 OFFICE O/P EST LOW 20 MIN: CPT | Mod: PBBFAC,25 | Performed by: OPHTHALMOLOGY

## 2024-03-18 PROCEDURE — 92134 CPTRZ OPH DX IMG PST SGM RTA: CPT | Mod: PBBFAC | Performed by: OPHTHALMOLOGY

## 2024-03-18 PROCEDURE — 99213 OFFICE O/P EST LOW 20 MIN: CPT | Mod: S$PBB,,, | Performed by: OPHTHALMOLOGY

## 2024-03-18 NOTE — PROGRESS NOTES
HPI     Glaucoma Suspect            Comments: Annual Eye Exam with GOCT: Pt reports floaters in the right   eye, unchanged and no flashes of light. States eye are often dry. Noticed   a blind spot when looking at a star at night, both eyes, can only see it   in the periphery, unchanged x 1 year.          Comments    COAG Suspect   FXHX Macula Degeneration   PCIOL OD 6/10/21 (mal ring) / CDE: 12.50/ SN6AT4 15.5   PCIOL OS 7/8/21 (mal ring)   Dry Eyes     OU Systane PRN    Trace central scotoma only noticeable in low light situations - not new             Last edited by Juliette Armstrong on 3/18/2024  8:21 AM.            Assessment /Plan     For exam results, see Encounter Report.      ICD-10-CM ICD-9-CM    1. Early dry stage nonexudative age-related macular degeneration of both eyes  H35.3131 362.51 Posterior Segment OCT Retina-Both eyes    Exam consistent with mild age related macular degeneration. Discussed clinical condition and recommend avoidance of tobacco products. Patient advised to call immediately if any visual changes occur.       2. Keratoconjunctivitis sicca  M35.01 710.2 Stable on exam  Continue Systane PRN      3. Pseudophakia of both eyes  Z96.1 V43.1 Monitor, s/p PCIOL OU 2021  Doing well       4. PVD (posterior vitreous detachment), right  H43.811 379.21 Patient seen and evaluated for PVD in the right eye. No tears or breaks were seen after careful retinal evaluation. Discussed retinal detachment signs and symptoms including flashes of lights, floaters, perceived curtains or veils. Advised to patient to monitor visual status including increase in flashes and floaters, or the development of visual field changes including curtain and /or veils. Advised patient to RTC urgently if these symptoms occur. Explained the need for follow up exams to the patient even if there are no changes in the symptoms.          Return to clinic 8 weeks for PVD f/u, DFE and MOCT        OU Systane PRN

## 2024-03-31 DIAGNOSIS — I10 ESSENTIAL HYPERTENSION: ICD-10-CM

## 2024-03-31 NOTE — TELEPHONE ENCOUNTER
No care due was identified.  [facility] Embedded Care Due Messages. Reference number: [id number].   3/31/2024 7:06:31 AM CDT

## 2024-04-01 RX ORDER — HYDROCHLOROTHIAZIDE 25 MG/1
TABLET ORAL
Qty: 90 TABLET | Refills: 3 | Status: SHIPPED | OUTPATIENT
Start: 2024-04-01

## 2024-04-01 NOTE — TELEPHONE ENCOUNTER
Refill Decision Note   Neymar Victoria  is requesting a refill authorization.  Brief Assessment and Rationale for Refill:  Approve     Medication Therapy Plan:         Comments:     Note composed:2:50 PM 04/01/2024             qtc [id number]

## 2024-05-20 ENCOUNTER — OFFICE VISIT (OUTPATIENT)
Dept: OPHTHALMOLOGY | Facility: CLINIC | Age: 74
End: 2024-05-20
Payer: MEDICARE

## 2024-05-20 DIAGNOSIS — Z96.1 PSEUDOPHAKIA OF BOTH EYES: ICD-10-CM

## 2024-05-20 DIAGNOSIS — M35.01 KERATOCONJUNCTIVITIS SICCA: ICD-10-CM

## 2024-05-20 DIAGNOSIS — H35.3131 EARLY DRY STAGE NONEXUDATIVE AGE-RELATED MACULAR DEGENERATION OF BOTH EYES: ICD-10-CM

## 2024-05-20 DIAGNOSIS — H43.811 PVD (POSTERIOR VITREOUS DETACHMENT), RIGHT: Primary | ICD-10-CM

## 2024-05-20 PROCEDURE — 99214 OFFICE O/P EST MOD 30 MIN: CPT | Mod: S$PBB,,, | Performed by: OPHTHALMOLOGY

## 2024-05-20 PROCEDURE — 99999 PR PBB SHADOW E&M-EST. PATIENT-LVL III: CPT | Mod: PBBFAC,,, | Performed by: OPHTHALMOLOGY

## 2024-05-20 PROCEDURE — 92134 CPTRZ OPH DX IMG PST SGM RTA: CPT | Mod: PBBFAC | Performed by: OPHTHALMOLOGY

## 2024-05-20 PROCEDURE — 99213 OFFICE O/P EST LOW 20 MIN: CPT | Mod: PBBFAC,25 | Performed by: OPHTHALMOLOGY

## 2024-05-20 NOTE — PROGRESS NOTES
HPI     Spots and/or Floaters            Comments: Pt here for 8 wk PVD f/u. No pain or discomfort. [facility] stable.           Comments    COAG Suspect   FXHX Macula Degeneration   PCIOL OD 6/10/21 (mal ring) / CDE: 12.50/ SN6AT4 15.5   PCIOL OS 7/8/21 (mal ring)   Dry Eyes     OU Systane PRN             Last edited by Ángel Shankar MA on 5/20/2024  9:12 AM.            Assessment /Plan     For exam results, see Encounter Report.      ICD-10-CM ICD-9-CM    1. PVD (posterior vitreous detachment), right  H43.811 379.21 Stable to previous DFE  Advised to call office with any changes in vision, new floaters/flashes      2. Early dry stage nonexudative age-related macular degeneration of both eyes  H35.3131 362.51 Posterior Segment OCT Retina-Both eyes    Exam consistent with mild age related macular degeneration. Discussed clinical condition and recommend avoidance of tobacco products. Patient advised to call immediately if any visual changes occur.         3. Keratoconjunctivitis sicca  M35.01 710.2       4. Pseudophakia of both eyes  Z96.1 V43.1           Return to clinic 1 year or PRN

## 2024-06-26 ENCOUNTER — TELEPHONE (OUTPATIENT)
Dept: INTERNAL MEDICINE | Facility: CLINIC | Age: 74
End: 2024-06-26
Payer: MEDICARE

## 2024-06-26 ENCOUNTER — PATIENT MESSAGE (OUTPATIENT)
Dept: INTERNAL MEDICINE | Facility: CLINIC | Age: 74
End: 2024-06-26
Payer: MEDICARE

## 2024-07-26 ENCOUNTER — LAB VISIT (OUTPATIENT)
Dept: LAB | Facility: HOSPITAL | Age: 74
End: 2024-07-26
Attending: FAMILY MEDICINE
Payer: MEDICARE

## 2024-07-26 DIAGNOSIS — I10 ESSENTIAL HYPERTENSION: ICD-10-CM

## 2024-07-26 DIAGNOSIS — E78.5 HYPERLIPIDEMIA WITH TARGET LOW DENSITY LIPOPROTEIN (LDL) CHOLESTEROL LESS THAN 160 MG/DL: ICD-10-CM

## 2024-07-26 DIAGNOSIS — E53.8 VITAMIN B12 DEFICIENCY: ICD-10-CM

## 2024-07-26 LAB
ALBUMIN SERPL BCP-MCNC: 3.9 G/DL (ref 3.5–5.2)
ALP SERPL-CCNC: 70 U/L (ref 55–135)
ALT SERPL W/O P-5'-P-CCNC: 25 U/L (ref 10–44)
ANION GAP SERPL CALC-SCNC: 12 MMOL/L (ref 8–16)
AST SERPL-CCNC: 17 U/L (ref 10–40)
BASOPHILS # BLD AUTO: 0.04 K/UL (ref 0–0.2)
BASOPHILS NFR BLD: 0.4 % (ref 0–1.9)
BILIRUB SERPL-MCNC: 0.6 MG/DL (ref 0.1–1)
BUN SERPL-MCNC: 15 MG/DL (ref 8–23)
CALCIUM SERPL-MCNC: 10.3 MG/DL (ref 8.7–10.5)
CHLORIDE SERPL-SCNC: 106 MMOL/L (ref 95–110)
CHOLEST SERPL-MCNC: 163 MG/DL (ref 120–199)
CHOLEST/HDLC SERPL: 4.5 {RATIO} (ref 2–5)
CO2 SERPL-SCNC: 22 MMOL/L (ref 23–29)
CREAT SERPL-MCNC: 1 MG/DL (ref 0.5–1.4)
DIFFERENTIAL METHOD BLD: ABNORMAL
EOSINOPHIL # BLD AUTO: 0.2 K/UL (ref 0–0.5)
EOSINOPHIL NFR BLD: 1.7 % (ref 0–8)
ERYTHROCYTE [DISTWIDTH] IN BLOOD BY AUTOMATED COUNT: 13.5 % (ref 11.5–14.5)
EST. GFR  (NO RACE VARIABLE): >60 ML/MIN/1.73 M^2
GLUCOSE SERPL-MCNC: 94 MG/DL (ref 70–110)
HCT VFR BLD AUTO: 49.7 % (ref 40–54)
HDLC SERPL-MCNC: 36 MG/DL (ref 40–75)
HDLC SERPL: 22.1 % (ref 20–50)
HGB BLD-MCNC: 15.6 G/DL (ref 14–18)
IMM GRANULOCYTES # BLD AUTO: 0.05 K/UL (ref 0–0.04)
IMM GRANULOCYTES NFR BLD AUTO: 0.5 % (ref 0–0.5)
LDLC SERPL CALC-MCNC: 101.4 MG/DL (ref 63–159)
LYMPHOCYTES # BLD AUTO: 2.3 K/UL (ref 1–4.8)
LYMPHOCYTES NFR BLD: 20.9 % (ref 18–48)
MCH RBC QN AUTO: 30.3 PG (ref 27–31)
MCHC RBC AUTO-ENTMCNC: 31.4 G/DL (ref 32–36)
MCV RBC AUTO: 97 FL (ref 82–98)
MONOCYTES # BLD AUTO: 0.8 K/UL (ref 0.3–1)
MONOCYTES NFR BLD: 7.1 % (ref 4–15)
NEUTROPHILS # BLD AUTO: 7.6 K/UL (ref 1.8–7.7)
NEUTROPHILS NFR BLD: 69.4 % (ref 38–73)
NONHDLC SERPL-MCNC: 127 MG/DL
NRBC BLD-RTO: 0 /100 WBC
PLATELET # BLD AUTO: 232 K/UL (ref 150–450)
PMV BLD AUTO: 9.9 FL (ref 9.2–12.9)
POTASSIUM SERPL-SCNC: 4.2 MMOL/L (ref 3.5–5.1)
PROT SERPL-MCNC: 7.2 G/DL (ref 6–8.4)
RBC # BLD AUTO: 5.15 M/UL (ref 4.6–6.2)
SODIUM SERPL-SCNC: 140 MMOL/L (ref 136–145)
TRIGL SERPL-MCNC: 128 MG/DL (ref 30–150)
TSH SERPL DL<=0.005 MIU/L-ACNC: 3.27 UIU/ML (ref 0.4–4)
VIT B12 SERPL-MCNC: 1311 PG/ML (ref 210–950)
WBC # BLD AUTO: 10.87 K/UL (ref 3.9–12.7)

## 2024-07-26 PROCEDURE — 84443 ASSAY THYROID STIM HORMONE: CPT | Performed by: FAMILY MEDICINE

## 2024-07-26 PROCEDURE — 80053 COMPREHEN METABOLIC PANEL: CPT | Performed by: FAMILY MEDICINE

## 2024-07-26 PROCEDURE — 82607 VITAMIN B-12: CPT | Performed by: FAMILY MEDICINE

## 2024-07-26 PROCEDURE — 36415 COLL VENOUS BLD VENIPUNCTURE: CPT | Performed by: FAMILY MEDICINE

## 2024-07-26 PROCEDURE — 80061 LIPID PANEL: CPT | Performed by: FAMILY MEDICINE

## 2024-07-26 PROCEDURE — 85025 COMPLETE CBC W/AUTO DIFF WBC: CPT | Performed by: FAMILY MEDICINE

## 2024-07-28 DIAGNOSIS — I10 ESSENTIAL HYPERTENSION: Chronic | ICD-10-CM

## 2024-07-29 RX ORDER — METOPROLOL SUCCINATE 200 MG/1
200 TABLET, EXTENDED RELEASE ORAL NIGHTLY
Qty: 90 TABLET | Refills: 3 | Status: SHIPPED | OUTPATIENT
Start: 2024-07-29

## 2024-07-31 ENCOUNTER — OFFICE VISIT (OUTPATIENT)
Dept: INTERNAL MEDICINE | Facility: CLINIC | Age: 74
End: 2024-07-31
Payer: MEDICARE

## 2024-07-31 VITALS
SYSTOLIC BLOOD PRESSURE: 136 MMHG | WEIGHT: 181.88 LBS | DIASTOLIC BLOOD PRESSURE: 72 MMHG | BODY MASS INDEX: 29.36 KG/M2 | TEMPERATURE: 97 F | OXYGEN SATURATION: 97 % | HEART RATE: 63 BPM

## 2024-07-31 DIAGNOSIS — E78.5 HYPERLIPIDEMIA WITH TARGET LOW DENSITY LIPOPROTEIN (LDL) CHOLESTEROL LESS THAN 160 MG/DL: Chronic | ICD-10-CM

## 2024-07-31 DIAGNOSIS — I10 ESSENTIAL HYPERTENSION: Primary | Chronic | ICD-10-CM

## 2024-07-31 DIAGNOSIS — E53.8 VITAMIN B12 DEFICIENCY: ICD-10-CM

## 2024-07-31 PROCEDURE — 99999 PR PBB SHADOW E&M-EST. PATIENT-LVL III: CPT | Mod: PBBFAC,,, | Performed by: PHYSICIAN ASSISTANT

## 2024-07-31 PROCEDURE — 99397 PER PM REEVAL EST PAT 65+ YR: CPT | Mod: S$PBB,GZ,, | Performed by: PHYSICIAN ASSISTANT

## 2024-07-31 PROCEDURE — 99213 OFFICE O/P EST LOW 20 MIN: CPT | Mod: PBBFAC,PO | Performed by: PHYSICIAN ASSISTANT

## 2024-07-31 NOTE — ASSESSMENT & PLAN NOTE
Diet-Controlled  Lab Results   Component Value Date    CHOL 163 07/26/2024    LDLCALC 101.4 07/26/2024    TRIG 128 07/26/2024    HDL 36 (L) 07/26/2024    ALT 25 07/26/2024    AST 17 07/26/2024    ALKPHOS 70 07/26/2024

## 2024-07-31 NOTE — PROGRESS NOTES
Subjective:       Patient ID: Neymar Victoria is a 74 y.o. male.    Chief Complaint: Annual Exam      Patient presents to clinic today for annual physical exam.        Review of Systems   Constitutional:  Negative for chills, fatigue, fever and unexpected weight change.   HENT:  Negative for congestion, dental problem, ear pain, hearing loss, rhinorrhea and trouble swallowing.    Eyes:  Negative for pain and visual disturbance.   Respiratory:  Negative for cough and shortness of breath.    Cardiovascular:  Negative for chest pain, palpitations and leg swelling.   Gastrointestinal:  Negative for abdominal distention, abdominal pain, blood in stool, constipation, diarrhea, nausea and vomiting.   Genitourinary:  Negative for difficulty urinating, scrotal swelling and testicular pain.   Musculoskeletal:  Positive for arthralgias (ongoing) and myalgias (ongoing).   Skin:  Negative for rash.   Neurological:  Negative for dizziness, weakness, numbness and headaches.   Hematological:  Negative for adenopathy. Does not bruise/bleed easily.   Psychiatric/Behavioral:  Negative for dysphoric mood and sleep disturbance. The patient is not nervous/anxious.        Objective:      Physical Exam  Vitals and nursing note reviewed.   Constitutional:       General: He is not in acute distress.     Appearance: He is well-developed.   HENT:      Head: Normocephalic and atraumatic.      Right Ear: Tympanic membrane, ear canal and external ear normal.      Left Ear: Tympanic membrane, ear canal and external ear normal.      Nose: Nose normal.      Mouth/Throat:      Lips: Pink.      Mouth: Mucous membranes are moist.      Pharynx: Oropharynx is clear. Uvula midline.   Eyes:      General: Lids are normal. No scleral icterus.     Conjunctiva/sclera: Conjunctivae normal.      Pupils: Pupils are equal, round, and reactive to light.   Neck:      Thyroid: No thyromegaly.   Cardiovascular:      Rate and Rhythm: Normal rate and regular rhythm.       Pulses: Normal pulses.   Pulmonary:      Effort: Pulmonary effort is normal.      Breath sounds: Normal breath sounds. No wheezing or rales.   Abdominal:      General: Bowel sounds are normal. There is no distension.      Palpations: Abdomen is soft. There is no mass.      Tenderness: There is no abdominal tenderness.   Musculoskeletal:         General: No tenderness. Normal range of motion.      Cervical back: Normal range of motion and neck supple.      Right lower leg: No edema.      Left lower leg: No edema.   Lymphadenopathy:      Cervical: No cervical adenopathy.   Skin:     General: Skin is warm and dry.      Findings: No rash.   Neurological:      Mental Status: He is alert.      Cranial Nerves: No cranial nerve deficit.   Psychiatric:         Mood and Affect: Mood and affect normal.         Assessment:       1. Essential hypertension    2. Hyperlipidemia with target low density lipoprotein (LDL) cholesterol less than 160 mg/dL    3. Vitamin B12 deficiency        Plan:   1. Essential hypertension  Assessment & Plan:  /72, controlled, continue amlodipine-valsartan, HCTZ and metoprolol  Lab Results   Component Value Date     07/26/2024    K 4.2 07/26/2024    BUN 15 07/26/2024    CREATININE 1.0 07/26/2024    EGFRNORACEVR >60.0 07/26/2024          2. Hyperlipidemia with target low density lipoprotein (LDL) cholesterol less than 160 mg/dL  Assessment & Plan:  Diet-Controlled  Lab Results   Component Value Date    CHOL 163 07/26/2024    LDLCALC 101.4 07/26/2024    TRIG 128 07/26/2024    HDL 36 (L) 07/26/2024    ALT 25 07/26/2024    AST 17 07/26/2024    ALKPHOS 70 07/26/2024        Orders:  -     Lipid Panel; Future; Expected date: 01/31/2025  -     Comprehensive Metabolic Panel; Future; Expected date: 01/31/2025    3. Vitamin B12 deficiency  Overview:  Continue supplement           Recent labs reviewed with patient.    Discussed shingrix and Tdap vaccines, advised can be obtained at pharmacy.   6 month  f/u with Dr. Chávez scheduled with fasting labs PTA   Health Maintenance reviewed/updated.      Visit today included increased complexity associated with the care of the episodic problem HTN, which was addressed while instituting co-management of the longitudinal care of the patient due to the serious and/or complex managed problem(s) .    I have evaluated and discussed management associated with medical care services that serve as the continuing focal point for all needed health care services and/or with medical care services that are part of ongoing care related to my patient's single, serious condition or a complex condition(s).    I am providing ongoing care and I am the primary care provider for this patient, and they are being managed, monitored, and/or observed for their chronic conditions over time.     I have addressed their ongoing health maintenance requirements and needs for all health care services and reviewed co-management plans provided by specialty providers when available.    Health Maintenance Due   Topic Date Due    Shingles Vaccine (1 of 2) Never done    RSV Vaccine (Age 60+ and Pregnant patients) (1 - 1-dose 60+ series) Never done    TETANUS VACCINE  08/19/2021

## 2024-07-31 NOTE — PATIENT INSTRUCTIONS
Check with your pharmacist regarding Tdap (tetanus/diphtheria/pertussis) vaccine.     Check with your pharmacist regarding shingrix vaccine.     Please bring your medication or a written list to your next office visit.    If you check your blood pressure at home, please bring written your blood pressure log and your blood pressure machine to your next office visit.

## 2024-07-31 NOTE — ASSESSMENT & PLAN NOTE
/74, controlled, continue amlodipine-valsartan, HCTZ and metoprolol  Lab Results   Component Value Date     07/26/2024    K 4.2 07/26/2024    BUN 15 07/26/2024    CREATININE 1.0 07/26/2024    EGFRNORACEVR >60.0 07/26/2024

## 2024-11-20 ENCOUNTER — LAB VISIT (OUTPATIENT)
Dept: LAB | Facility: HOSPITAL | Age: 74
End: 2024-11-20
Attending: FAMILY MEDICINE
Payer: MEDICARE

## 2024-11-20 ENCOUNTER — OFFICE VISIT (OUTPATIENT)
Dept: INTERNAL MEDICINE | Facility: CLINIC | Age: 74
End: 2024-11-20
Payer: MEDICARE

## 2024-11-20 VITALS
HEART RATE: 61 BPM | DIASTOLIC BLOOD PRESSURE: 78 MMHG | WEIGHT: 173.94 LBS | HEIGHT: 66 IN | BODY MASS INDEX: 27.95 KG/M2 | OXYGEN SATURATION: 98 % | RESPIRATION RATE: 18 BRPM | SYSTOLIC BLOOD PRESSURE: 150 MMHG

## 2024-11-20 DIAGNOSIS — I10 ESSENTIAL HYPERTENSION: ICD-10-CM

## 2024-11-20 DIAGNOSIS — R35.89 POLYURIA: ICD-10-CM

## 2024-11-20 DIAGNOSIS — K59.00 CONSTIPATION, ACUTE: Primary | ICD-10-CM

## 2024-11-20 LAB
ALBUMIN SERPL BCP-MCNC: 3.9 G/DL (ref 3.5–5.2)
ALP SERPL-CCNC: 77 U/L (ref 40–150)
ALT SERPL W/O P-5'-P-CCNC: 26 U/L (ref 10–44)
ANION GAP SERPL CALC-SCNC: 10 MMOL/L (ref 8–16)
AST SERPL-CCNC: 19 U/L (ref 10–40)
BILIRUB SERPL-MCNC: 0.4 MG/DL (ref 0.1–1)
BILIRUB UR QL STRIP: NEGATIVE
BUN SERPL-MCNC: 12 MG/DL (ref 8–23)
CALCIUM SERPL-MCNC: 10.8 MG/DL (ref 8.7–10.5)
CHLORIDE SERPL-SCNC: 103 MMOL/L (ref 95–110)
CLARITY UR: CLEAR
CO2 SERPL-SCNC: 26 MMOL/L (ref 23–29)
COLOR UR: YELLOW
CREAT SERPL-MCNC: 0.9 MG/DL (ref 0.5–1.4)
EST. GFR  (NO RACE VARIABLE): >60 ML/MIN/1.73 M^2
GLUCOSE SERPL-MCNC: 96 MG/DL (ref 70–110)
GLUCOSE UR QL STRIP: NEGATIVE
HGB UR QL STRIP: NEGATIVE
KETONES UR QL STRIP: NEGATIVE
LEUKOCYTE ESTERASE UR QL STRIP: NEGATIVE
NITRITE UR QL STRIP: NEGATIVE
PH UR STRIP: 6 [PH] (ref 5–8)
POTASSIUM SERPL-SCNC: 4.5 MMOL/L (ref 3.5–5.1)
PROT SERPL-MCNC: 8 G/DL (ref 6–8.4)
PROT UR QL STRIP: NEGATIVE
SODIUM SERPL-SCNC: 139 MMOL/L (ref 136–145)
SP GR UR STRIP: 1.01 (ref 1–1.03)
URN SPEC COLLECT METH UR: NORMAL
UROBILINOGEN UR STRIP-ACNC: NEGATIVE EU/DL

## 2024-11-20 PROCEDURE — G2211 COMPLEX E/M VISIT ADD ON: HCPCS | Mod: S$PBB,,, | Performed by: FAMILY MEDICINE

## 2024-11-20 PROCEDURE — 99999 PR PBB SHADOW E&M-EST. PATIENT-LVL IV: CPT | Mod: PBBFAC,,, | Performed by: FAMILY MEDICINE

## 2024-11-20 PROCEDURE — 81003 URINALYSIS AUTO W/O SCOPE: CPT | Performed by: FAMILY MEDICINE

## 2024-11-20 PROCEDURE — 99214 OFFICE O/P EST MOD 30 MIN: CPT | Mod: PBBFAC | Performed by: FAMILY MEDICINE

## 2024-11-20 PROCEDURE — 36415 COLL VENOUS BLD VENIPUNCTURE: CPT | Performed by: FAMILY MEDICINE

## 2024-11-20 PROCEDURE — 99214 OFFICE O/P EST MOD 30 MIN: CPT | Mod: S$PBB,,, | Performed by: FAMILY MEDICINE

## 2024-11-20 PROCEDURE — 80053 COMPREHEN METABOLIC PANEL: CPT | Performed by: FAMILY MEDICINE

## 2024-11-20 RX ORDER — AMLODIPINE AND VALSARTAN 10; 320 MG/1; MG/1
1 TABLET ORAL DAILY
Qty: 90 TABLET | Refills: 1 | Status: SHIPPED | OUTPATIENT
Start: 2024-11-20 | End: 2025-11-20

## 2024-11-20 RX ORDER — POLYETHYLENE GLYCOL 3350 17 G/17G
17 POWDER, FOR SOLUTION ORAL DAILY PRN
COMMUNITY
Start: 2024-11-20

## 2024-11-20 NOTE — PROGRESS NOTES
Subjective:       Patient ID: Neymar Victoria is a 74 y.o. male.    Chief Complaint: Constipation      History of Present Illness    - Patient reports constipation starting on Tuesday. He discontinued hydrochlorothiazide on Tuesday due to this issue. On Thursday, he took Dulcolax, resulting in minimal bowel movement, and repeated it the following day with limited effect. He resumed hydrochlorothiazide on Monday.  - After discontinuing hydrochlorothiazide, the patient noticed cessation of burning sensation in his feet, which had been an ongoing issue. He also reports visual floaters, which have significantly decreased since stopping the medication.  - Patient had transient leg weakness for 2 days last week, which has since resolved. He reports increased fluid intake throughout the day and night. Last night, he noted unusually large urine output, estimating approximately 2 gallons during nighttime urination.  - Patient mentions transient right-sided pain earlier today, which has since subsided. He denies dysuria and reports normal urine color, neither dark nor cloudy. He also denies urinary incontinence and chronic constipation.  Patient is otherwise without concerns today.      Review of Systems   Constitutional:  Negative for activity change and unexpected weight change.   HENT:  Negative for hearing loss, rhinorrhea and trouble swallowing.    Eyes:  Negative for discharge and visual disturbance.   Respiratory:  Negative for chest tightness and wheezing.    Cardiovascular:  Negative for chest pain and palpitations.   Gastrointestinal:  Positive for constipation. Negative for blood in stool, diarrhea and vomiting.   Endocrine: Positive for polydipsia and polyuria.   Genitourinary:  Negative for difficulty urinating, hematuria and urgency.   Musculoskeletal:  Negative for arthralgias, joint swelling and neck pain.   Neurological:  Negative for weakness and headaches.   Psychiatric/Behavioral:  Negative for confusion and  dysphoric mood.          Objective:      Physical Exam  Vitals reviewed.   Constitutional:       General: He is not in acute distress.     Appearance: He is well-developed.   HENT:      Head: Normocephalic and atraumatic.   Eyes:      General: Lids are normal. No scleral icterus.     Extraocular Movements: Extraocular movements intact.      Conjunctiva/sclera: Conjunctivae normal.      Pupils: Pupils are equal, round, and reactive to light.   Cardiovascular:      Rate and Rhythm: Normal rate and regular rhythm.      Heart sounds: No murmur heard.     No friction rub. No gallop.   Pulmonary:      Effort: Pulmonary effort is normal.      Breath sounds: Normal breath sounds. No decreased breath sounds, wheezing, rhonchi or rales.   Abdominal:      General: Bowel sounds are normal. There is no distension.      Palpations: Abdomen is soft. There is no mass.      Tenderness: There is no abdominal tenderness.   Neurological:      Mental Status: He is alert and oriented to person, place, and time.      Cranial Nerves: No cranial nerve deficit.      Gait: Gait normal.   Psychiatric:         Mood and Affect: Mood and affect normal.         Assessment:       1. Constipation, acute    2. Essential hypertension    3. Polyuria        Plan:   1. Constipation, acute  -     polyethylene glycol (GLYCOLAX) 17 gram/dose powder; Take 17 g by mouth daily as needed for Constipation.    2. Essential hypertension  -     Comprehensive Metabolic Panel; Future; Expected date: 11/20/2024  -     amlodipine-valsartan (EXFORGE)  mg per tablet; Take 1 tablet by mouth once daily.  Dispense: 90 tablet; Refill: 1    3. Polyuria  -     Comprehensive Metabolic Panel; Future; Expected date: 11/20/2024  -     Urinalysis, Reflex to Urine Culture Urine, Clean Catch      Assessment & Plan    HYPERTENSION:   Evaluated patient's current blood pressure medications, including hydrochlorothiazide, amlodipine, valsartan, and metoprolol.   Assessed that  patient's blood pressure requires better control and decided to adjust medication.   Discontinued hydrochlorothiazide due to patient's report of foot burning.   Increased valsartan component of amlodipine-valsartan combination to 320 mg to improve blood pressure control.   Prescribed new combination of amlodipine-valsartan with valsartan component increased to 320 mg, to be taken in the morning.   Continued metoprolol, to be taken in the morning, gradually adjusting the time earlier over the next few days (10 AM tomorrow, then 8 AM the following day).   discontinued hydrochlorothiazide.    URINARY FREQUENCY:   Evaluated patient for increased urination, especially at night.   Ordered urinalysis to rule out urinary tract infection.    VISION CHANGES:   Discontinued hydrochlorothiazide due to patient's report of increased floaters in vision that improved upon stopping the medication.    ABDOMINAL PAIN:   Noted patient's report of occasional pain in the right lower quadrant.   Evaluated that the pain is intermittent and not present during the visit.   Assessed that the pain could be related to constipation.   Ordered urine sample to rule out urinary tract infection.    CONSTIPATION:   Considered constipation likely acute and potentially related to medication changes or dehydration.   Prescribed Miralax: 1 capful daily for 3-5 days, then as needed for constipation.   Noted patient's report of feeling constipated since Tuesday, with minimal relief from laxatives.   Evaluated that patient has tried Dulcolax with little effect.   Recommend increased water intake along with Miralax.    Patient expressed understanding and agreement with plan.    Visit today included increased complexity associated with the care of the episodic problem constipation, which was addressed while instituting co-management of the longitudinal care of the patient due to the serious and/or complex managed problem(s) .    I have evaluated and discussed  management associated with medical care services that serve as the continuing focal point for all needed health care services and/or with medical care services that are part of ongoing care related to my patient's single, serious condition or a complex condition(s).    I am providing ongoing care and I am the primary care provider for this patient, and they are being managed, monitored, and/or observed for their chronic conditions over time.     I have addressed their ongoing health maintenance requirements and needs for all health care services and reviewed co-management plans provided by specialty providers when available.    Health Maintenance Due   Topic Date Due    Shingles Vaccine (1 of 2) Never done    RSV Vaccine (Age 60+ and Pregnant patients) (1 - Risk 60-74 years 1-dose series) Never done    COVID-19 Vaccine (4 - 2024-25 season) 09/01/2024     Health Maintenance reviewed/updated.    Follow up in about 1 month (around 12/20/2024), or if symptoms worsen or fail to improve, for follow up for constipation/polyuria/hypertension with Olga.    This note was generated with the assistance of ambient listening technology. I attest to having reviewed and edited the generated note for accuracy, though some syntax or spelling errors may persist. Please contact the author of this note for any clarification.

## 2024-11-29 DIAGNOSIS — E83.52 HYPERCALCEMIA: Primary | ICD-10-CM

## 2024-12-03 ENCOUNTER — LAB VISIT (OUTPATIENT)
Dept: LAB | Facility: HOSPITAL | Age: 74
End: 2024-12-03
Attending: FAMILY MEDICINE
Payer: MEDICARE

## 2024-12-03 DIAGNOSIS — E83.52 HYPERCALCEMIA: ICD-10-CM

## 2024-12-03 LAB
25(OH)D3+25(OH)D2 SERPL-MCNC: 35 NG/ML (ref 30–96)
CALCIUM SERPL-MCNC: 10.3 MG/DL (ref 8.7–10.5)
PTH-INTACT SERPL-MCNC: 20.3 PG/ML (ref 9–77)

## 2024-12-03 PROCEDURE — 82310 ASSAY OF CALCIUM: CPT | Performed by: FAMILY MEDICINE

## 2024-12-03 PROCEDURE — 83970 ASSAY OF PARATHORMONE: CPT | Performed by: FAMILY MEDICINE

## 2024-12-03 PROCEDURE — 36415 COLL VENOUS BLD VENIPUNCTURE: CPT | Performed by: FAMILY MEDICINE

## 2024-12-03 PROCEDURE — 82306 VITAMIN D 25 HYDROXY: CPT | Performed by: FAMILY MEDICINE

## 2024-12-20 ENCOUNTER — OFFICE VISIT (OUTPATIENT)
Dept: INTERNAL MEDICINE | Facility: CLINIC | Age: 74
End: 2024-12-20
Payer: MEDICARE

## 2024-12-20 VITALS
SYSTOLIC BLOOD PRESSURE: 132 MMHG | HEIGHT: 66 IN | BODY MASS INDEX: 27.65 KG/M2 | OXYGEN SATURATION: 97 % | WEIGHT: 172.06 LBS | DIASTOLIC BLOOD PRESSURE: 60 MMHG | HEART RATE: 70 BPM

## 2024-12-20 DIAGNOSIS — I10 ESSENTIAL HYPERTENSION: Primary | Chronic | ICD-10-CM

## 2024-12-20 PROCEDURE — 99999 PR PBB SHADOW E&M-EST. PATIENT-LVL IV: CPT | Mod: PBBFAC,,, | Performed by: PHYSICIAN ASSISTANT

## 2024-12-20 PROCEDURE — 99214 OFFICE O/P EST MOD 30 MIN: CPT | Mod: PBBFAC | Performed by: PHYSICIAN ASSISTANT

## 2024-12-20 NOTE — PROGRESS NOTES
Subjective:       Patient ID: Neymar Victoria is a 74 y.o. male.      CHIEF COMPLAINT:  - Neymar presents for a follow-up visit regarding blood pressure management and to discuss side effects of new medication.    HPI:  - Neymar reports blood pressure fluctuations since his last visit 5 weeks ago when his medication was adjusted. His blood pressure spiked to 170 approximately 1-2 weeks after the medication change, but has been stabilizing in the past couple of days.  - Neymar expresses concerns about side effects from metoprolol. He takes the medication at 7:30 AM and reports fatigue and lack of motivation for several hours afterward, typically until around 11 AM or noon. On some days, these symptoms persist until evening.  - Neymar reports improvement in previously reported constipation and resolution of visual disturbances in his right eye.  - Hydrochlorothiazide was discontinued at the last visit due to dehydration.    MEDICATIONS:  - Metoprolol, taken at 7:30 AM, causes fatigue and lack of energy for 2-3 hours in the afternoon  - Amlodipine, for blood pressure control    MEDICAL HISTORY:  - Hypertension  - Dehydration: Past    TEST RESULTS:  - Kidney function: Past, reported as satisfactory        Review of Systems   Constitutional:  Positive for fatigue. Negative for chills, fever and unexpected weight change.   HENT:  Negative for congestion, dental problem, ear pain, hearing loss, rhinorrhea and trouble swallowing.    Eyes:  Negative for pain and visual disturbance.   Respiratory:  Negative for cough and shortness of breath.    Cardiovascular:  Negative for chest pain, palpitations and leg swelling.   Gastrointestinal:  Negative for abdominal distention, abdominal pain, blood in stool, constipation, diarrhea, nausea and vomiting.   Genitourinary:  Negative for difficulty urinating.   Musculoskeletal:  Negative for arthralgias and myalgias.   Skin:  Negative for rash.   Neurological:  Negative for dizziness, weakness,  numbness and headaches.   Hematological:  Negative for adenopathy. Does not bruise/bleed easily.   Psychiatric/Behavioral:  Negative for agitation, dysphoric mood and sleep disturbance.        Objective:      Physical Exam  Vitals reviewed.   Constitutional:       General: He is not in acute distress.     Appearance: Normal appearance. He is well-developed and normal weight. He is not ill-appearing or toxic-appearing.   HENT:      Head: Normocephalic and atraumatic.   Eyes:      General: Lids are normal. No scleral icterus.     Extraocular Movements: Extraocular movements intact.      Conjunctiva/sclera: Conjunctivae normal.      Pupils: Pupils are equal, round, and reactive to light.   Cardiovascular:      Rate and Rhythm: Normal rate and regular rhythm.      Heart sounds: Normal heart sounds. No murmur heard.     No friction rub. No gallop.   Pulmonary:      Effort: Pulmonary effort is normal.      Breath sounds: Normal breath sounds. No decreased breath sounds, wheezing, rhonchi or rales.   Musculoskeletal:      Right lower leg: No edema.      Left lower leg: No edema.   Neurological:      General: No focal deficit present.      Mental Status: He is alert and oriented to person, place, and time. Mental status is at baseline.      Cranial Nerves: No cranial nerve deficit.      Gait: Gait normal.   Psychiatric:         Mood and Affect: Mood and affect normal.         Behavior: Behavior normal.         Thought Content: Thought content normal.         Judgment: Judgment normal.         Assessment:       1. Essential hypertension        Plan:   1. Essential hypertension  -     Ambulatory referral/consult to Cardiology; Future; Expected date: 12/27/2024        Assessment & Plan    IMPRESSION:  - Assessed blood pressure control following medication changes  - Noted improvement in blood pressure readings, with recent stabilization  - Evaluated kidney function via lab work, finding it to be satisfactory  - Will maintain  current medication regimen despite side effects, prioritizing stroke prevention  - Determined need for cardiology consultation to potentially optimize medication regimen    RENOVASCULAR HYPERTENSION:   Explained that feeling unwell may be due to body adjusting to normalized blood pressure after being elevated for an extended period.   Discussed risks of elevated blood pressure, including potential for eye and kidney damage.   Emphasized importance of continuing medication to prevent stroke, despite side effects.   Continued metoprolol and amlodipine for blood pressure control.   Referred to cardiology for evaluation and potential medication adjustment.    FOLLOW-UP:   Follow up annually in February as scheduled.              This note was generated with the assistance of ambient listening technology. Verbal consent was obtained by the patient and accompanying visitor(s) for the recording of patient appointment to facilitate this note. I attest to having reviewed and edited the generated note for accuracy, though some syntax or spelling errors may persist. Please contact the author of this note for any clarification.

## 2024-12-23 ENCOUNTER — TELEPHONE (OUTPATIENT)
Dept: CARDIOLOGY | Facility: CLINIC | Age: 74
End: 2024-12-23
Payer: MEDICARE

## 2024-12-23 NOTE — TELEPHONE ENCOUNTER
Spoke with pt in regards to rescheduling appt due to scheduling conflict.                       ----- Message from Sheree sent at 12/23/2024  8:11 AM CST -----  Contact: Neymar  Type:  Sooner Apoointment Request    Caller is requesting a sooner appointment.  Caller declined first available appointment listed below.  Caller will not accept being placed on the waitlist and is requesting a message be sent to doctor.  Name of Caller:Neymar   When is the first available appointment?pt has an appt on 1/3/25 and would like a sooner appt   Symptoms:Essential hypertension, high blood pressure, general cardiology   Would the patient rather a call back or a response via [facility]? call  Best Call Back Number:[phone]    Additional Information:  any day is fine minus the 3rd.7th or the 10th

## 2025-01-02 ENCOUNTER — HOSPITAL ENCOUNTER (OUTPATIENT)
Dept: CARDIOLOGY | Facility: HOSPITAL | Age: 75
Discharge: HOME | End: 2025-01-02
Attending: INTERNAL MEDICINE
Payer: MEDICARE

## 2025-01-02 ENCOUNTER — OFFICE VISIT (OUTPATIENT)
Dept: CARDIOLOGY | Facility: CLINIC | Age: 75
End: 2025-01-02
Payer: MEDICARE

## 2025-01-02 VITALS
OXYGEN SATURATION: 97 % | HEIGHT: 66 IN | DIASTOLIC BLOOD PRESSURE: 75 MMHG | RESPIRATION RATE: 16 BRPM | BODY MASS INDEX: 27.77 KG/M2 | HEART RATE: 83 BPM | SYSTOLIC BLOOD PRESSURE: 138 MMHG

## 2025-01-02 DIAGNOSIS — Z72.0 TOBACCO USE: Chronic | ICD-10-CM

## 2025-01-02 DIAGNOSIS — I10 ESSENTIAL HYPERTENSION: Primary | Chronic | ICD-10-CM

## 2025-01-02 DIAGNOSIS — I10 ESSENTIAL HYPERTENSION: Chronic | ICD-10-CM

## 2025-01-02 DIAGNOSIS — I70.0 AORTO-ILIAC ATHEROSCLEROSIS: Primary | ICD-10-CM

## 2025-01-02 DIAGNOSIS — R53.83 FATIGUE, UNSPECIFIED TYPE: ICD-10-CM

## 2025-01-02 DIAGNOSIS — R00.1 BRADYCARDIA: ICD-10-CM

## 2025-01-02 DIAGNOSIS — I70.8 AORTO-ILIAC ATHEROSCLEROSIS: Primary | ICD-10-CM

## 2025-01-02 DIAGNOSIS — E78.49 OTHER HYPERLIPIDEMIA: ICD-10-CM

## 2025-01-02 DIAGNOSIS — Z78.9 STATIN INTOLERANCE: ICD-10-CM

## 2025-01-02 DIAGNOSIS — G47.30 SLEEP APNEA, UNSPECIFIED TYPE: ICD-10-CM

## 2025-01-02 LAB
OHS QRS DURATION: 92 MS
OHS QTC CALCULATION: 434 MS

## 2025-01-02 PROCEDURE — 93005 ELECTROCARDIOGRAM TRACING: CPT

## 2025-01-02 PROCEDURE — 99214 OFFICE O/P EST MOD 30 MIN: CPT | Mod: PBBFAC,25 | Performed by: INTERNAL MEDICINE

## 2025-01-02 PROCEDURE — 99999 PR PBB SHADOW E&M-EST. PATIENT-LVL IV: CPT | Mod: PBBFAC,,, | Performed by: INTERNAL MEDICINE

## 2025-01-02 PROCEDURE — 93010 ELECTROCARDIOGRAM REPORT: CPT | Mod: ,,, | Performed by: INTERNAL MEDICINE

## 2025-01-02 PROCEDURE — G2211 COMPLEX E/M VISIT ADD ON: HCPCS | Mod: S$PBB,,, | Performed by: INTERNAL MEDICINE

## 2025-01-02 PROCEDURE — 99204 OFFICE O/P NEW MOD 45 MIN: CPT | Mod: S$PBB,,, | Performed by: INTERNAL MEDICINE

## 2025-01-02 NOTE — PROGRESS NOTES
Subjective:   Patient ID:  Neymar Victoria is a 74 y.o. male who presents for cardiac consult of No chief complaint on file.      Referral by: [facility]  [id number] [place] [address]     Reason for consult: HTN      HPI  The patient came in today for cardiac consult of No chief complaint on file.    1/2/25  Neymar Victoria is a 74 y.o. male pt with HTN, aortic atherosc, HLD, overweight, tobacco use presents for CV eval for HTN.     BP improved with increased Valsartan dose but has more fatigue.   Reviewed BP log - overall highest in 140s.     FH - CVA    No results found for this or any previous visit.      No results found for this or any previous visit.      No results found for this or any previous visit.      No cardiac monitor results found for the past 12 months         Past Medical History:   Diagnosis Date    Arthritis     Early dry stage nonexudative age-related macular degeneration of both eyes 9/28/2020    Erectile dysfunction     Hyperlipidemia LDL goal < 160     Hypertension     Seasonal allergies        Past Surgical History:   Procedure Laterality Date    CATARACT EXTRACTION W/ INTRAOCULAR LENS IMPLANT Right 06/10/2021    COLONOSCOPY N/A 02/29/2016    Procedure: COLONOSCOPY;  Surgeon: Aruna Rocha MD;  Location: [facility];  Service: Endoscopy;  Laterality: N/A;    COLONOSCOPY N/A 06/07/2019    Procedure: COLONOSCOPY;  Surgeon: Jacobo Sigala MD;  Location: [facility];  Service: Endoscopy;  Laterality: N/A;    COLONOSCOPY N/A 09/09/2022    Procedure: COLONOSCOPY;  Surgeon: Jacobo Sigala MD;  Location: [facility];  Service: General;  Laterality: N/A;    WISDOM TOOTH EXTRACTION         Social History     Tobacco Use    Smoking status: Every Day     Current packs/day: 0.50     Average packs/day: 0.5 packs/day for 30.0 years (15.0 ttl pk-yrs)     Types: Cigarettes    Smokeless tobacco: Never   Substance Use Topics    Alcohol use: Never     Comment: social use  of alchol    Drug use: No       Family History   Problem Relation Name Age of Onset    Macular degeneration Mother Johan Victoria     Arthritis Mother Johan Victoria     Cancer Mother Johan Victoria     Vision loss Mother Johan Victoria     Melanoma Father Nilesh Victoria     Cancer Father Nilesh Victoria     Hypertension Unknown      Stroke Paternal Grandfather Neymar Victoria     Psoriasis Neg Hx      Lupus Neg Hx         Patient's Medications   New Prescriptions    No medications on file   Previous Medications    AMLODIPINE-VALSARTAN (EXFORGE)  MG PER TABLET    Take 1 tablet by mouth once daily.    CYANOCOBALAMIN (VITAMIN B-12) 1000 MCG TABLET    Take 1 tablet (1,000 mcg total) by mouth once daily.    METOPROLOL SUCCINATE (TOPROL-XL) 200 MG 24 HR TABLET    TAKE 1 TABLET BY MOUTH EVERY EVENING.    POLYETHYLENE GLYCOL (GLYCOLAX) 17 GRAM/DOSE POWDER    Take 17 g by mouth daily as needed for Constipation.    PROPYLENE GLYCOL/ (SYSTANE OPHT)    Apply to eye.    TADALAFIL (CIALIS) 10 MG TABLET    1 tablet at least 30 minutes prior to sexual activity; not more than once daily   Modified Medications    No medications on file   Discontinued Medications    No medications on file       Review of Systems   Constitutional: Negative.    HENT: Negative.     Eyes: Negative.    Respiratory: Negative.     Cardiovascular:  Positive for palpitations.   Gastrointestinal: Negative.    Genitourinary: Negative.    Musculoskeletal: Negative.    Skin: Negative.    Neurological: Negative.    Endo/Heme/Allergies: Negative.    Psychiatric/Behavioral: Negative.     All 12 systems otherwise negative.      Wt Readings from Last 3 Encounters:   12/20/24 78 kg (172 lb 1.1 oz)   11/20/24 78.9 kg (173 lb 15.1 oz)   07/31/24 82.5 kg (181 lb 14.1 oz)     Temp Readings from Last 3 Encounters:   07/31/24 97.3 °F (36.3 °C) (Tympanic)   01/30/24 98.3 °F (36.8 °C) (Tympanic)   07/25/23 96.1 °F (35.6 °C) (Tympanic)     BP Readings from Last 3  "Encounters:   01/02/25 138/75   12/20/24 132/60   11/20/24 (!) 150/78     Pulse Readings from Last 3 Encounters:   01/02/25 83   12/20/24 70   11/20/24 61       /75   Pulse 83   Resp 16   Ht 5' 6" (1.676 m)   SpO2 97%   BMI 27.77 kg/m²     Objective:   Physical Exam  Vitals and nursing note reviewed.   Constitutional:       General: He is not in acute distress.     Appearance: He is well-developed. He is not diaphoretic.   HENT:      Head: Normocephalic and atraumatic.      Nose: Nose normal.   Eyes:      General: No scleral icterus.     Conjunctiva/sclera: Conjunctivae normal.   Neck:      Thyroid: No thyromegaly.      Vascular: No JVD.   Cardiovascular:      Rate and Rhythm: Normal rate and regular rhythm.      Heart sounds: S1 normal and S2 normal. Murmur heard.      No friction rub. No gallop. No S3 or S4 sounds.   Pulmonary:      Effort: Pulmonary effort is normal. No respiratory distress.      Breath sounds: Normal breath sounds. No stridor. No wheezing or rales.   Chest:      Chest wall: No tenderness.   Abdominal:      General: Bowel sounds are normal. There is no distension.      Palpations: Abdomen is soft. There is no mass.      Tenderness: There is no abdominal tenderness. There is no rebound.   Genitourinary:     Comments: Deferred  Musculoskeletal:         General: No tenderness or deformity. Normal range of motion.      Cervical back: Normal range of motion and neck supple.   Lymphadenopathy:      Cervical: No cervical adenopathy.   Skin:     General: Skin is warm and dry.      Coloration: Skin is not pale.      Findings: No erythema or rash.   Neurological:      Mental Status: He is alert and oriented to person, place, and time.      Motor: No abnormal muscle tone.      Coordination: Coordination normal.   Psychiatric:         Behavior: Behavior normal.         Thought Content: Thought content normal.         Judgment: Judgment normal.         Lab Results   Component Value Date     " "11/20/2024    K 4.5 11/20/2024     11/20/2024    CO2 26 11/20/2024    BUN 12 11/20/2024    CREATININE 0.9 11/20/2024    GLU 96 11/20/2024    HGBA1C 5.9 10/19/2015    AST 19 11/20/2024    ALT 26 11/20/2024    ALBUMIN 3.9 11/20/2024    PROT 8.0 11/20/2024    BILITOT 0.4 11/20/2024    WBC 10.87 07/26/2024    HGB 15.6 07/26/2024    HCT 49.7 07/26/2024    MCV 97 07/26/2024     07/26/2024    TSH 3.274 07/26/2024    CHOL 163 07/26/2024    HDL 36 (L) 07/26/2024    LDLCALC 101.4 07/26/2024    TRIG 128 07/26/2024         No results found for: "BNP", "INR"       Assessment:      1. Aorto-iliac atherosclerosis    2. Essential hypertension    3. Statin intolerance    4. Other hyperlipidemia    5. Tobacco use    6. Fatigue, unspecified type    7. Bradycardia    8. Sleep apnea, unspecified type        Plan:     HTN  - r/o KOKI  - order ECG stress test, ECHO, Holter  - will discuss further tx     2. HLD - aorta iliac atheosc  - diet controlled.   - statin intolerant    3. Tobacco abuse  - discussed smoking cessation    Visit today included increased complexity associated with the care of the episodic problem HTN addressed and managing the longitudinal care of the patient due to the serious and/or complex managed problem(s) .      Thank you for allowing me to participate in this patient's care. Please do not hesitate to contact me with any questions or concerns. Consult note has been forwarded to the referral physician.     "

## 2025-01-03 ENCOUNTER — PATIENT MESSAGE (OUTPATIENT)
Dept: PULMONOLOGY | Facility: CLINIC | Age: 75
End: 2025-01-03
Payer: MEDICARE

## 2025-01-15 ENCOUNTER — HOSPITAL ENCOUNTER (OUTPATIENT)
Dept: CARDIOLOGY | Facility: HOSPITAL | Age: 75
Discharge: HOME | End: 2025-01-15
Attending: INTERNAL MEDICINE
Payer: MEDICARE

## 2025-01-15 VITALS
HEART RATE: 57 BPM | HEIGHT: 66 IN | SYSTOLIC BLOOD PRESSURE: 152 MMHG | WEIGHT: 172 LBS | DIASTOLIC BLOOD PRESSURE: 72 MMHG | BODY MASS INDEX: 27.64 KG/M2

## 2025-01-15 VITALS
WEIGHT: 172 LBS | DIASTOLIC BLOOD PRESSURE: 75 MMHG | BODY MASS INDEX: 27.64 KG/M2 | SYSTOLIC BLOOD PRESSURE: 138 MMHG | HEIGHT: 66 IN

## 2025-01-15 DIAGNOSIS — R00.1 BRADYCARDIA: ICD-10-CM

## 2025-01-15 DIAGNOSIS — R53.83 FATIGUE, UNSPECIFIED TYPE: ICD-10-CM

## 2025-01-15 DIAGNOSIS — E78.49 OTHER HYPERLIPIDEMIA: ICD-10-CM

## 2025-01-15 DIAGNOSIS — I70.8 AORTO-ILIAC ATHEROSCLEROSIS: ICD-10-CM

## 2025-01-15 DIAGNOSIS — I10 ESSENTIAL HYPERTENSION: Chronic | ICD-10-CM

## 2025-01-15 DIAGNOSIS — Z78.9 STATIN INTOLERANCE: ICD-10-CM

## 2025-01-15 DIAGNOSIS — I70.0 AORTO-ILIAC ATHEROSCLEROSIS: ICD-10-CM

## 2025-01-15 DIAGNOSIS — Z72.0 TOBACCO USE: Chronic | ICD-10-CM

## 2025-01-15 LAB
AORTIC ROOT ANNULUS: 3.29 CM
ASCENDING AORTA: 3.11 CM
AV INDEX (PROSTH): 0.69
AV MEAN GRADIENT: 3.7 MMHG
AV PEAK GRADIENT: 6.8 MMHG
AV VALVE AREA BY VELOCITY RATIO: 2.4 CM²
AV VALVE AREA: 2.4 CM²
AV VELOCITY RATIO: 0.69
BSA FOR ECHO PROCEDURE: 1.91 M2
CV ECHO LV RWT: 0.38 CM
CV STRESS BASE HR: 57 BPM
DIASTOLIC BLOOD PRESSURE: 72 MMHG
DOP CALC AO PEAK VEL: 1.3 M/S
DOP CALC AO VTI: 32.5 CM
DOP CALC LVOT AREA: 3.5 CM2
DOP CALC LVOT DIAMETER: 2.1 CM
DOP CALC LVOT PEAK VEL: 0.9 M/S
DOP CALC LVOT STROKE VOLUME: 77.5 CM3
DOP CALC RVOT PEAK VEL: 0.74 M/S
DOP CALC RVOT VTI: 19.1 CM
DOP CALCLVOT PEAK VEL VTI: 22.4 CM
E WAVE DECELERATION TIME: 234.82 MSEC
E/A RATIO: 1.1
E/E' RATIO: 10 M/S
ECHO LV POSTERIOR WALL: 1 CM (ref 0.6–1.1)
EJECTION FRACTION: 60 %
FRACTIONAL SHORTENING: 28.3 % (ref 28–44)
INTERVENTRICULAR SEPTUM: 0.8 CM (ref 0.6–1.1)
IVC DIAMETER: 1.57 CM
IVRT: 57.09 MSEC
LA MAJOR: 5.17 CM
LA MINOR: 5.45 CM
LA WIDTH: 3.9 CM
LEFT ATRIUM AREA SYSTOLIC (APICAL 2 CHAMBER): 19.33 CM2
LEFT ATRIUM AREA SYSTOLIC (APICAL 4 CHAMBER): 18.46 CM2
LEFT ATRIUM SIZE: 4.03 CM
LEFT ATRIUM VOLUME INDEX MOD: 29.5 ML/M2
LEFT ATRIUM VOLUME INDEX: 37.7 ML/M2
LEFT ATRIUM VOLUME MOD: 55.44 ML
LEFT ATRIUM VOLUME: 70.89 CM3
LEFT INTERNAL DIMENSION IN SYSTOLE: 3.8 CM (ref 2.1–4)
LEFT VENTRICLE DIASTOLIC VOLUME INDEX: 70.97 ML/M2
LEFT VENTRICLE DIASTOLIC VOLUME: 133.42 ML
LEFT VENTRICLE END SYSTOLIC VOLUME APICAL 2 CHAMBER: 60.1 ML
LEFT VENTRICLE END SYSTOLIC VOLUME APICAL 4 CHAMBER: 50.26 ML
LEFT VENTRICLE MASS INDEX: 92.8 G/M2
LEFT VENTRICLE SYSTOLIC VOLUME INDEX: 32.8 ML/M2
LEFT VENTRICLE SYSTOLIC VOLUME: 61.63 ML
LEFT VENTRICULAR INTERNAL DIMENSION IN DIASTOLE: 5.3 CM (ref 3.5–6)
LEFT VENTRICULAR MASS: 174.5 G
LV LATERAL E/E' RATIO: 8 M/S
LV SEPTAL E/E' RATIO: 13.33 M/S
LVED V (TEICH): 133.42 ML
LVES V (TEICH): 61.63 ML
LVOT MG: 1.63 MMHG
LVOT MV: 0.59 CM/S
MV PEAK A VEL: 0.73 M/S
MV PEAK E VEL: 0.8 M/S
MV STENOSIS PRESSURE HALF TIME: 68.1 MS
MV VALVE AREA P 1/2 METHOD: 3.23 CM2
OHS CV CPX 1 MINUTE RECOVERY HEART RATE: 117 BPM
OHS CV CPX 85 PERCENT MAX PREDICTED HEART RATE MALE: 126
OHS CV CPX ESTIMATED METS: 5
OHS CV CPX MAX PREDICTED HEART RATE: 148
OHS CV CPX PATIENT IS FEMALE: 0
OHS CV CPX PATIENT IS MALE: 1
OHS CV CPX PEAK DIASTOLIC BLOOD PRESSURE: 81 MMHG
OHS CV CPX PEAK HEAR RATE: 122 BPM
OHS CV CPX PEAK RATE PRESSURE PRODUCT: NORMAL
OHS CV CPX PEAK SYSTOLIC BLOOD PRESSURE: 205 MMHG
OHS CV CPX PERCENT MAX PREDICTED HEART RATE ACHIEVED: 82
OHS CV CPX RATE PRESSURE PRODUCT PRESENTING: 8664
OHS CV RV/LV RATIO: 0.64 CM
PISA TR MAX VEL: 2.93 M/S
PULM VEIN S/D RATIO: 1.44
PV MEAN GRADIENT: 1 MMHG
PV MV: 0.65 M/S
PV PEAK D VEL: 0.55 M/S
PV PEAK GRADIENT: 3 MMHG
PV PEAK S VEL: 0.79 M/S
PV PEAK VELOCITY: 0.9 M/S
RA MAJOR: 5.25 CM
RA PRESSURE ESTIMATED: 3 MMHG
RA WIDTH: 3.51 CM
RIGHT VENTRICLE DIASTOLIC BASEL DIMENSION: 3.4 CM
RIGHT VENTRICULAR END-DIASTOLIC DIMENSION: 3.41 CM
RV TB RVSP: 6 MMHG
SINUS: 2.86 CM
STJ: 2.87 CM
STRESS ECHO POST EXERCISE DUR MIN: 2 MINUTES
STRESS ECHO POST EXERCISE DUR SEC: 20 SECONDS
SYSTOLIC BLOOD PRESSURE: 152 MMHG
TDI LATERAL: 0.1 M/S
TDI SEPTAL: 0.06 M/S
TDI: 0.08 M/S
TR MAX PG: 34 MMHG
TRICUSPID ANNULAR PLANE SYSTOLIC EXCURSION: 2.08 CM
TV REST PULMONARY ARTERY PRESSURE: 37 MMHG
Z-SCORE OF LEFT VENTRICULAR DIMENSION IN END DIASTOLE: 0.09
Z-SCORE OF LEFT VENTRICULAR DIMENSION IN END SYSTOLE: 1.27

## 2025-01-15 PROCEDURE — 93306 TTE W/DOPPLER COMPLETE: CPT | Mod: 26,,, | Performed by: INTERNAL MEDICINE

## 2025-01-15 PROCEDURE — 93016 CV STRESS TEST SUPVJ ONLY: CPT | Mod: ,,, | Performed by: INTERNAL MEDICINE

## 2025-01-15 PROCEDURE — 93017 CV STRESS TEST TRACING ONLY: CPT

## 2025-01-15 PROCEDURE — 93225 XTRNL ECG REC<48 HRS REC: CPT

## 2025-01-15 PROCEDURE — 93306 TTE W/DOPPLER COMPLETE: CPT

## 2025-01-15 PROCEDURE — 93018 CV STRESS TEST I&R ONLY: CPT | Mod: ,,, | Performed by: INTERNAL MEDICINE

## 2025-01-24 ENCOUNTER — TELEPHONE (OUTPATIENT)
Dept: CARDIOLOGY | Facility: CLINIC | Age: 75
End: 2025-01-24
Payer: MEDICARE

## 2025-01-24 NOTE — TELEPHONE ENCOUNTER
Spoke with patient who states he started having weakness in legs after taking his BP medicine, states it all started after the increase in Amlodipine in December. Scheduled appt for next Tuesday.    ----- Message from Kayleigh sent at 1/24/2025 12:41 PM CST -----  Regarding: Appointment  Contact: patient  Per phone call with patient , he stated that he would like to schedule an appointment to see the physician regarding weakness in both legs when taking blood pressure medication.  Please return call at [phone] (home).    Thanks,  SJ

## 2025-01-28 ENCOUNTER — OFFICE VISIT (OUTPATIENT)
Dept: CARDIOLOGY | Facility: CLINIC | Age: 75
End: 2025-01-28
Payer: MEDICARE

## 2025-01-28 VITALS
DIASTOLIC BLOOD PRESSURE: 75 MMHG | RESPIRATION RATE: 16 BRPM | BODY MASS INDEX: 26.82 KG/M2 | HEIGHT: 66 IN | OXYGEN SATURATION: 98 % | WEIGHT: 166.88 LBS | SYSTOLIC BLOOD PRESSURE: 128 MMHG | HEART RATE: 83 BPM

## 2025-01-28 DIAGNOSIS — Z72.0 TOBACCO USE: ICD-10-CM

## 2025-01-28 DIAGNOSIS — I70.0 AORTO-ILIAC ATHEROSCLEROSIS: ICD-10-CM

## 2025-01-28 DIAGNOSIS — R00.1 BRADYCARDIA: ICD-10-CM

## 2025-01-28 DIAGNOSIS — I27.20 PULMONARY HYPERTENSION: ICD-10-CM

## 2025-01-28 DIAGNOSIS — I70.8 AORTO-ILIAC ATHEROSCLEROSIS: ICD-10-CM

## 2025-01-28 DIAGNOSIS — M54.2 NECK PAIN: ICD-10-CM

## 2025-01-28 DIAGNOSIS — I73.9 PVD (PERIPHERAL VASCULAR DISEASE): ICD-10-CM

## 2025-01-28 DIAGNOSIS — E78.49 OTHER HYPERLIPIDEMIA: Primary | ICD-10-CM

## 2025-01-28 DIAGNOSIS — G47.30 SLEEP APNEA, UNSPECIFIED TYPE: ICD-10-CM

## 2025-01-28 DIAGNOSIS — Z78.9 STATIN INTOLERANCE: ICD-10-CM

## 2025-01-28 DIAGNOSIS — I10 ESSENTIAL HYPERTENSION: ICD-10-CM

## 2025-01-28 DIAGNOSIS — R53.83 FATIGUE, UNSPECIFIED TYPE: ICD-10-CM

## 2025-01-28 PROCEDURE — 99214 OFFICE O/P EST MOD 30 MIN: CPT | Mod: S$PBB,,, | Performed by: INTERNAL MEDICINE

## 2025-01-28 PROCEDURE — G2211 COMPLEX E/M VISIT ADD ON: HCPCS | Mod: S$PBB,,, | Performed by: INTERNAL MEDICINE

## 2025-01-28 PROCEDURE — 99214 OFFICE O/P EST MOD 30 MIN: CPT | Mod: PBBFAC | Performed by: INTERNAL MEDICINE

## 2025-01-28 PROCEDURE — 99999 PR PBB SHADOW E&M-EST. PATIENT-LVL IV: CPT | Mod: PBBFAC,,, | Performed by: INTERNAL MEDICINE

## 2025-01-28 RX ORDER — AMLODIPINE AND VALSARTAN 10; 320 MG/1; MG/1
1 TABLET ORAL NIGHTLY
Qty: 90 TABLET | Refills: 1 | Status: SHIPPED | OUTPATIENT
Start: 2025-01-28 | End: 2026-01-28

## 2025-01-28 RX ORDER — METOPROLOL SUCCINATE 200 MG/1
200 TABLET, EXTENDED RELEASE ORAL NIGHTLY
Qty: 90 TABLET | Refills: 3 | Status: SHIPPED | OUTPATIENT
Start: 2025-01-28

## 2025-01-28 NOTE — PROGRESS NOTES
Subjective:   Patient ID:  Neymar Victoria is a 74 y.o. male who presents for cardiac consult of No chief complaint on file.      Referral by: No referring provider defined for this encounter.     Reason for consult: HTN      HPI  The patient came in today for cardiac consult of No chief complaint on file.      Neymar Victoria is a 74 y.o. male pt with HTN, aortic atherosc, HLD, overweight, tobacco use presents for follow up CV eval.     1/2/25  BP improved with increased Valsartan dose but has more fatigue.   Reviewed BP log - overall highest in 140s.     1/28/25  From notes - Spoke with patient who states he started having weakness in legs after taking his BP medicine, states it all started after the increase in Amlodipine in December. Scheduled appt for next Tuesday.     ECHO 1/2025 with normal bi V function, mild mR, PASP 37 mmHg.   ECG stress test 1/2025 neg for ischemia, HTN response, low capacity 2.5 min    BP and HR stable. BMI 26 - 166 lbs   He gets leg weakness for about 2 hours after taking Exforge.   He has more constipation at times.     FH - CVA  Results for orders placed during the hospital encounter of 01/15/25    Exercise Stress - EKG    Interpretation Summary    The ECG portion of the study is negative for ischemia.    The patient reported no chest pain during the stress test.    The exercise capacity was below average.      Results for orders placed during the hospital encounter of 01/15/25    Echo    Interpretation Summary    Left Ventricle: The left ventricle is normal in size. Normal wall thickness. There is normal systolic function. Ejection fraction is approximately 60%.    Right Ventricle: Normal right ventricular cavity size. Wall thickness is normal. Systolic function is normal.    Left Atrium: Left atrium is mildly dilated.    Mitral Valve: There is mild regurgitation with a centrally directed jet.    Pulmonary Artery: The estimated pulmonary artery systolic pressure is 37 mmHg.    IVC/SVC:  Normal venous pressure at 3 mmHg.        No cardiac monitor results found for the past 12 months         Past Medical History:   Diagnosis Date    Arthritis     Early dry stage nonexudative age-related macular degeneration of both eyes 9/28/2020    Erectile dysfunction     Hyperlipidemia LDL goal < 160     Hypertension     Seasonal allergies        Past Surgical History:   Procedure Laterality Date    CATARACT EXTRACTION W/ INTRAOCULAR LENS IMPLANT Right 06/10/2021    COLONOSCOPY N/A 02/29/2016    Procedure: COLONOSCOPY;  Surgeon: Aruna Rocha MD;  Location: [facility] ENDO;  Service: Endoscopy;  Laterality: N/A;    COLONOSCOPY N/A 06/07/2019    Procedure: COLONOSCOPY;  Surgeon: Jacobo Sigala MD;  Location: [facility] ENDO;  Service: Endoscopy;  Laterality: N/A;    COLONOSCOPY N/A 09/09/2022    Procedure: COLONOSCOPY;  Surgeon: Jacobo Sigala MD;  Location: [facility];  Service: General;  Laterality: N/A;    WISDOM TOOTH EXTRACTION         Social History     Tobacco Use    Smoking status: Every Day     Current packs/day: 0.50     Average packs/day: 0.5 packs/day for 30.0 years (15.0 ttl pk-yrs)     Types: Cigarettes    Smokeless tobacco: Never   Substance Use Topics    Alcohol use: Never     Comment: social use of alchol    Drug use: No       Family History   Problem Relation Name Age of Onset    Macular degeneration Mother Johan Victoria     Arthritis Mother Johan Victoria     Cancer Mother Johan Victoria     Vision loss Mother Johan Victoria     Melanoma Father Nilesh Victoria     Cancer Father Nilesh Victoria     Hypertension Unknown      Stroke Paternal Grandfather Neymar Victoria     Psoriasis Neg Hx      Lupus Neg Hx         Patient's Medications   New Prescriptions    No medications on file   Previous Medications    AMLODIPINE-VALSARTAN (EXFORGE)  MG PER TABLET    Take 1 tablet by mouth once daily.    CYANOCOBALAMIN (VITAMIN B-12) 1000 MCG TABLET    Take 1 tablet (1,000 mcg total) by mouth once  daily.    METOPROLOL SUCCINATE (TOPROL-XL) 200 MG 24 HR TABLET    TAKE 1 TABLET BY MOUTH EVERY EVENING.    POLYETHYLENE GLYCOL (GLYCOLAX) 17 GRAM/DOSE POWDER    Take 17 g by mouth daily as needed for Constipation.    PROPYLENE GLYCOL/ (SYSTANE OPHT)    Apply to eye.    TADALAFIL (CIALIS) 10 MG TABLET    1 tablet at least 30 minutes prior to sexual activity; not more than once daily   Modified Medications    No medications on file   Discontinued Medications    No medications on file       Review of Systems   Constitutional: Negative.    HENT: Negative.     Eyes: Negative.    Respiratory: Negative.     Cardiovascular:  Positive for palpitations.   Gastrointestinal: Negative.    Genitourinary: Negative.    Musculoskeletal: Negative.    Skin: Negative.    Neurological: Negative.    Endo/Heme/Allergies: Negative.    Psychiatric/Behavioral: Negative.     All 12 systems otherwise negative.      Wt Readings from Last 3 Encounters:   01/15/25 78 kg (172 lb)   01/15/25 78 kg (172 lb)   12/20/24 78 kg (172 lb 1.1 oz)     Temp Readings from Last 3 Encounters:   07/31/24 97.3 °F (36.3 °C) (Tympanic)   01/30/24 98.3 °F (36.8 °C) (Tympanic)   07/25/23 96.1 °F (35.6 °C) (Tympanic)     BP Readings from Last 3 Encounters:   01/15/25 138/75   01/15/25 (!) 152/72   01/02/25 138/75     Pulse Readings from Last 3 Encounters:   01/15/25 (!) 57   01/02/25 83   12/20/24 70       There were no vitals taken for this visit.    Objective:   Physical Exam  Vitals and nursing note reviewed.   Constitutional:       General: He is not in acute distress.     Appearance: He is well-developed. He is not diaphoretic.   HENT:      Head: Normocephalic and atraumatic.      Nose: Nose normal.   Eyes:      General: No scleral icterus.     Conjunctiva/sclera: Conjunctivae normal.   Neck:      Thyroid: No thyromegaly.      Vascular: No JVD.   Cardiovascular:      Rate and Rhythm: Normal rate and regular rhythm.      Heart sounds: S1 normal and S2  "normal. Murmur heard.      No friction rub. No gallop. No S3 or S4 sounds.   Pulmonary:      Effort: Pulmonary effort is normal. No respiratory distress.      Breath sounds: Normal breath sounds. No stridor. No wheezing or rales.   Chest:      Chest wall: No tenderness.   Abdominal:      General: Bowel sounds are normal. There is no distension.      Palpations: Abdomen is soft. There is no mass.      Tenderness: There is no abdominal tenderness. There is no rebound.   Genitourinary:     Comments: Deferred  Musculoskeletal:         General: No tenderness or deformity. Normal range of motion.      Cervical back: Normal range of motion and neck supple.   Lymphadenopathy:      Cervical: No cervical adenopathy.   Skin:     General: Skin is warm and dry.      Coloration: Skin is not pale.      Findings: No erythema or rash.   Neurological:      Mental Status: He is alert and oriented to person, place, and time.      Motor: No abnormal muscle tone.      Coordination: Coordination normal.   Psychiatric:         Behavior: Behavior normal.         Thought Content: Thought content normal.         Judgment: Judgment normal.         Lab Results   Component Value Date     11/20/2024    K 4.5 11/20/2024     11/20/2024    CO2 26 11/20/2024    BUN 12 11/20/2024    CREATININE 0.9 11/20/2024    GLU 96 11/20/2024    HGBA1C 5.9 10/19/2015    AST 19 11/20/2024    ALT 26 11/20/2024    ALBUMIN 3.9 11/20/2024    PROT 8.0 11/20/2024    BILITOT 0.4 11/20/2024    WBC 10.87 07/26/2024    HGB 15.6 07/26/2024    HCT 49.7 07/26/2024    MCV 97 07/26/2024     07/26/2024    TSH 3.274 07/26/2024    CHOL 163 07/26/2024    HDL 36 (L) 07/26/2024    LDLCALC 101.4 07/26/2024    TRIG 128 07/26/2024         No results found for: "BNP", "INR"       Assessment:      1. Other hyperlipidemia    2. Bradycardia    3. Essential hypertension    4. Aorto-iliac atherosclerosis    5. Statin intolerance    6. Fatigue, unspecified type    7. Tobacco " use    8. Sleep apnea, unspecified type          Plan:     HTN; leg weakness   - r/o KOKI  -ECHO 1/2025 with normal bi V function, mild mR, PASP 37 mmHg.   -ECG stress test 1/2025 neg for ischemia, HTN response, low capacity 2.5 min  - Holter pending?  - will discuss further tx - unclear etiology for leg weakness  - order LE u/s arterial and OSCAR   - change meds to QHS    2. HLD - aorta iliac atheosc  - diet controlled.   - statin intolerant    3. Tobacco abuse  - discussed smoking cessation    4. Neck pain, h/o back/neck OA  - refer to pain mangement     Visit today included increased complexity associated with the care of the episodic problem HTN addressed and managing the longitudinal care of the patient due to the serious and/or complex managed problem(s) .      Thank you for allowing me to participate in this patient's care. Please do not hesitate to contact me with any questions or concerns. Consult note has been forwarded to the referral physician.

## 2025-01-29 ENCOUNTER — HOSPITAL ENCOUNTER (OUTPATIENT)
Dept: CARDIOLOGY | Facility: HOSPITAL | Age: 75
Discharge: HOME | End: 2025-01-29
Attending: INTERNAL MEDICINE
Payer: MEDICARE

## 2025-01-29 PROCEDURE — 93226 XTRNL ECG REC<48 HR SCAN A/R: CPT

## 2025-01-31 LAB
OHS CV EVENT MONITOR DAY: 0
OHS CV HOLTER LENGTH DECIMAL HOURS: 47.98
OHS CV HOLTER LENGTH HOURS: 47
OHS CV HOLTER LENGTH MINUTES: 59
OHS CV HOLTER SINUS AVERAGE HR: 64
OHS CV HOLTER SINUS MAX HR: 120
OHS CV HOLTER SINUS MIN HR: 40

## 2025-02-05 ENCOUNTER — OFFICE VISIT (OUTPATIENT)
Dept: INTERNAL MEDICINE | Facility: CLINIC | Age: 75
End: 2025-02-05
Payer: MEDICARE

## 2025-02-05 VITALS
SYSTOLIC BLOOD PRESSURE: 118 MMHG | TEMPERATURE: 97 F | DIASTOLIC BLOOD PRESSURE: 64 MMHG | OXYGEN SATURATION: 98 % | HEART RATE: 67 BPM | WEIGHT: 164.25 LBS | BODY MASS INDEX: 26.51 KG/M2

## 2025-02-05 DIAGNOSIS — K59.00 CONSTIPATION, UNSPECIFIED CONSTIPATION TYPE: Primary | ICD-10-CM

## 2025-02-05 DIAGNOSIS — M35.01 SJOGREN SYNDROME WITH KERATOCONJUNCTIVITIS: ICD-10-CM

## 2025-02-05 DIAGNOSIS — I10 ESSENTIAL HYPERTENSION: Chronic | ICD-10-CM

## 2025-02-05 DIAGNOSIS — R20.2 PARESTHESIAS: ICD-10-CM

## 2025-02-05 PROCEDURE — 99214 OFFICE O/P EST MOD 30 MIN: CPT | Mod: PBBFAC,PO | Performed by: PHYSICIAN ASSISTANT

## 2025-02-05 PROCEDURE — 99999 PR PBB SHADOW E&M-EST. PATIENT-LVL IV: CPT | Mod: PBBFAC,,, | Performed by: PHYSICIAN ASSISTANT

## 2025-02-05 PROCEDURE — 99214 OFFICE O/P EST MOD 30 MIN: CPT | Mod: S$PBB,,, | Performed by: PHYSICIAN ASSISTANT

## 2025-02-05 PROCEDURE — G2211 COMPLEX E/M VISIT ADD ON: HCPCS | Mod: S$PBB,,, | Performed by: PHYSICIAN ASSISTANT

## 2025-02-05 NOTE — PROGRESS NOTES
Subjective:       Patient ID: Neymar Victoria is a 74 y.o. male.    Chief Complaint: Leg Pain (Patient stated that his legs are giving out on him. He said that the are fine for a while and then give out. He said that it coincides with a change in his medication. ) and Constipation (Patient stated that he has been struggling with constipation since December. )      History of Present Illness    CHIEF COMPLAINT:  Mr. Victoria presents today for leg weakness and burning sensation in feet.    HISTORY OF PRESENT ILLNESS:  He reports leg weakness that began January 15th while using a treadmill. He experiences difficulty walking to mailbox due to legs giving out. He reports burning sensation in bilateral feet up to ankles and leg aches. Symptoms improve with cessation of movement. He denies numbness or tingling sensations. Reports chronic, intermittent lower back pain. Burning improved by Tylenol.     GASTROINTESTINAL:  He reports recent constipation which has improved with Miralax. He has had successful bowel movements in the past two days and a small amount the morning of the visit.    MUSCULOSKELETAL HISTORY:  He has arthritis affecting hands, back, and neck, confirmed by imaging studies in 2012. He was diagnosed with a crack in his fifth vertebra in the 1970s at age 23, for which fusion surgery was offered but not pursued. He reports history of intermittent back pain episodes over the years, typically resulting in 2-3 days of limited mobility, though notes no recent occurrences.    CARDIAC:  Recent stress test, echocardiogram, and two Holter monitor tests were all normal. He is scheduled for an OSCAR in April through cardiology.         XR L-spine 10/2012:   Multilevel marginal spurring.  Prominent facet arthropathy and mild loss of disc height at throughout the lumbar spine.  Minimal grade 1 retrolisthesis L4 on L5.  Pedicles and SI joints appear intact.  Vascular calcifications noted.     Review of Systems   Constitutional:   Positive for appetite change (decreased). Negative for chills, fever and unexpected weight change.   Respiratory:  Negative for shortness of breath.    Cardiovascular:  Negative for chest pain.   Gastrointestinal:  Positive for constipation. Negative for abdominal pain, diarrhea, nausea and vomiting.   Musculoskeletal:  Positive for back pain, gait problem and myalgias.   Neurological:  Positive for weakness. Negative for numbness.         Objective:     Vitals:    02/05/25 1116   BP: 118/64   Pulse: 67   Temp: 96.9 °F (36.1 °C)   TempSrc: Tympanic   SpO2: 98%   Weight: 74.5 kg (164 lb 3.9 oz)            Physical Exam  Vitals and nursing note reviewed.   Constitutional:       General: He is not in acute distress.     Appearance: He is well-developed.   HENT:      Head: Normocephalic and atraumatic.   Eyes:      General: Lids are normal. No scleral icterus.     Extraocular Movements: Extraocular movements intact.      Conjunctiva/sclera: Conjunctivae normal.   Cardiovascular:      Pulses: Normal pulses.   Pulmonary:      Effort: Pulmonary effort is normal.   Musculoskeletal:      Lumbar back: Normal. No swelling, deformity, tenderness or bony tenderness. Normal range of motion. Negative right straight leg raise test and negative left straight leg raise test.      Right lower leg: No edema.      Left lower leg: No edema.      Comments: LE strength 5/5 BL   Neurological:      Mental Status: He is alert.      Cranial Nerves: No cranial nerve deficit.      Motor: Motor function is intact.      Coordination: Coordination normal.      Gait: Gait is intact.      Deep Tendon Reflexes:      Reflex Scores:       Patellar reflexes are 2+ on the right side and 2+ on the left side.  Psychiatric:         Mood and Affect: Mood and affect normal.           Assessment:       1. Constipation, unspecified constipation type    2. Paresthesias    3. Essential hypertension    4. Sjogren syndrome with keratoconjunctivitis        Plan:    1. Constipation, unspecified constipation type  -     lactulose (CHRONULAC) 20 gram/30 mL Soln; Take 30 mLs (20 g total) by mouth once daily.  Dispense: 420 mL; Refill: 2    2. Paresthesias  -     Nerve conduction test; Future  -     X-Ray Lumbar Spine Ap And Lateral; Future; Expected date: 02/05/2025    3. Essential hypertension  Assessment & Plan:  Controlled, continue amlodipine-valsartan, metoprolol      4. Sjogren syndrome with keratoconjunctivitis  Overview:  Followed by Ophthalmology, continue current treatment plan          Assessment & Plan    IMPRESSION:  - Weakness and burning sensation in legs potentially indicative of nerve issue  - Ordered updated imaging of spine due to long period since last imaging (2012)  - Scheduled nerve conduction study to assess nerve communication from back to legs  - Considering referral to neuromedical specialists at [facility] if nerve issue confirmed    PATIENT EDUCATION:  - Explained importance of fiber intake for constipation management  - Discussed benefits of whole grains, fruits, vegetables, and legumes for fiber  - Explained significance of water intake for constipation and effectiveness of Miralax  - Educated on benefits of eating whole fruits rather than drinking fruit juices  - Discussed rationale behind recommending 3 main meals instead of frequent snacking    ACTION ITEMS/LIFESTYLE:  - Mr. Victoria to increase fiber intake through diet (whole grains, fruits, vegetables, legumes)  - Mr. Victoria to increase water intake  - Recommend 3 main meals (breakfast, lunch, dinner) instead of frequent snacking  - Mr. Victoria can consider 1-2 day liquid diet if constipation persists    MEDICATIONS:  - Started Lactulose as an alternative to Miralax for constipation management    ORDERS:  - X-ray imaging of spine ordered  - Nerve conduction study scheduled    FOLLOW UP:  - Follow up after completion of ordered tests (x-ray and EMG) to discuss results and potential referral to  neuromedical specialists           Visit today included increased complexity associated with the care of the episodic problem HTN, which was addressed while instituting co-management of the longitudinal care of the patient due to the serious and/or complex managed problem(s) .    I have evaluated and discussed management associated with medical care services that serve as the continuing focal point for all needed health care services and/or with medical care services that are part of ongoing care related to my patient's single, serious condition or a complex condition(s).    I am providing ongoing care and I am the primary care provider for this patient, and they are being managed, monitored, and/or observed for their chronic conditions over time.     I have addressed their ongoing health maintenance requirements and needs for all health care services and reviewed co-management plans provided by specialty providers when available.    Health Maintenance Due   Topic Date Due    Shingles Vaccine (1 of 2) Never done    RSV Vaccine (Age 60+ and Pregnant patients) (1 - Risk 60-74 years 1-dose series) Never done    COVID-19 Vaccine (4 - 2024-25 season) 09/01/2024

## 2025-02-07 ENCOUNTER — LAB VISIT (OUTPATIENT)
Dept: LAB | Facility: HOSPITAL | Age: 75
End: 2025-02-07
Attending: PHYSICIAN ASSISTANT
Payer: MEDICARE

## 2025-02-07 DIAGNOSIS — E78.5 HYPERLIPIDEMIA WITH TARGET LOW DENSITY LIPOPROTEIN (LDL) CHOLESTEROL LESS THAN 160 MG/DL: Chronic | ICD-10-CM

## 2025-02-07 LAB
ALBUMIN SERPL BCP-MCNC: 3.3 G/DL (ref 3.5–5.2)
ALP SERPL-CCNC: 86 U/L (ref 40–150)
ALT SERPL W/O P-5'-P-CCNC: 31 U/L (ref 10–44)
ANION GAP SERPL CALC-SCNC: 9 MMOL/L (ref 8–16)
AST SERPL-CCNC: 22 U/L (ref 10–40)
BILIRUB SERPL-MCNC: 0.6 MG/DL (ref 0.1–1)
BUN SERPL-MCNC: 11 MG/DL (ref 8–23)
CALCIUM SERPL-MCNC: 11 MG/DL (ref 8.7–10.5)
CHLORIDE SERPL-SCNC: 103 MMOL/L (ref 95–110)
CHOLEST SERPL-MCNC: 129 MG/DL (ref 120–199)
CHOLEST/HDLC SERPL: 4.4 {RATIO} (ref 2–5)
CO2 SERPL-SCNC: 25 MMOL/L (ref 23–29)
CREAT SERPL-MCNC: 0.8 MG/DL (ref 0.5–1.4)
EST. GFR  (NO RACE VARIABLE): >60 ML/MIN/1.73 M^2
GLUCOSE SERPL-MCNC: 92 MG/DL (ref 70–110)
HDLC SERPL-MCNC: 29 MG/DL (ref 40–75)
HDLC SERPL: 22.5 % (ref 20–50)
LDLC SERPL CALC-MCNC: 81.6 MG/DL (ref 63–159)
NONHDLC SERPL-MCNC: 100 MG/DL
POTASSIUM SERPL-SCNC: 4.2 MMOL/L (ref 3.5–5.1)
PROT SERPL-MCNC: 7.5 G/DL (ref 6–8.4)
SODIUM SERPL-SCNC: 137 MMOL/L (ref 136–145)
TRIGL SERPL-MCNC: 92 MG/DL (ref 30–150)

## 2025-02-07 PROCEDURE — 36415 COLL VENOUS BLD VENIPUNCTURE: CPT | Performed by: PHYSICIAN ASSISTANT

## 2025-02-07 PROCEDURE — 80061 LIPID PANEL: CPT | Performed by: PHYSICIAN ASSISTANT

## 2025-02-07 PROCEDURE — 80053 COMPREHEN METABOLIC PANEL: CPT | Performed by: PHYSICIAN ASSISTANT

## 2025-02-11 ENCOUNTER — HOSPITAL ENCOUNTER (OUTPATIENT)
Dept: RADIOLOGY | Facility: HOSPITAL | Age: 75
Discharge: HOME | End: 2025-02-11
Attending: PHYSICIAN ASSISTANT
Payer: MEDICARE

## 2025-02-11 ENCOUNTER — OFFICE VISIT (OUTPATIENT)
Dept: INTERNAL MEDICINE | Facility: CLINIC | Age: 75
End: 2025-02-11
Payer: MEDICARE

## 2025-02-11 ENCOUNTER — OFFICE VISIT (OUTPATIENT)
Facility: CLINIC | Age: 75
End: 2025-02-11
Payer: MEDICARE

## 2025-02-11 VITALS
TEMPERATURE: 97 F | HEART RATE: 68 BPM | BODY MASS INDEX: 25.51 KG/M2 | DIASTOLIC BLOOD PRESSURE: 74 MMHG | RESPIRATION RATE: 16 BRPM | HEIGHT: 66 IN | SYSTOLIC BLOOD PRESSURE: 132 MMHG | WEIGHT: 158.75 LBS | OXYGEN SATURATION: 96 %

## 2025-02-11 DIAGNOSIS — G62.9 PERIPHERAL POLYNEUROPATHY: Primary | ICD-10-CM

## 2025-02-11 DIAGNOSIS — R20.2 PARESTHESIAS: ICD-10-CM

## 2025-02-11 DIAGNOSIS — M51.9 LUMBAR DISC DISEASE: ICD-10-CM

## 2025-02-11 DIAGNOSIS — I10 ESSENTIAL HYPERTENSION: Primary | ICD-10-CM

## 2025-02-11 DIAGNOSIS — E83.52 HYPERCALCEMIA: ICD-10-CM

## 2025-02-11 DIAGNOSIS — E78.5 HYPERLIPIDEMIA WITH TARGET LOW DENSITY LIPOPROTEIN (LDL) CHOLESTEROL LESS THAN 160 MG/DL: ICD-10-CM

## 2025-02-11 DIAGNOSIS — E53.8 VITAMIN B12 DEFICIENCY: ICD-10-CM

## 2025-02-11 PROCEDURE — 99214 OFFICE O/P EST MOD 30 MIN: CPT | Mod: PBBFAC,25 | Performed by: FAMILY MEDICINE

## 2025-02-11 PROCEDURE — 72100 X-RAY EXAM L-S SPINE 2/3 VWS: CPT | Mod: TC

## 2025-02-11 PROCEDURE — 99214 OFFICE O/P EST MOD 30 MIN: CPT | Mod: S$PBB,,, | Performed by: FAMILY MEDICINE

## 2025-02-11 PROCEDURE — 72100 X-RAY EXAM L-S SPINE 2/3 VWS: CPT | Mod: 26,,, | Performed by: RADIOLOGY

## 2025-02-11 PROCEDURE — G2211 COMPLEX E/M VISIT ADD ON: HCPCS | Mod: S$PBB,,, | Performed by: FAMILY MEDICINE

## 2025-02-11 PROCEDURE — 99999 PR PBB SHADOW E&M-EST. PATIENT-LVL IV: CPT | Mod: PBBFAC,,, | Performed by: FAMILY MEDICINE

## 2025-02-11 PROCEDURE — 95910 NRV CNDJ TEST 7-8 STUDIES: CPT | Mod: PBBFAC | Performed by: STUDENT IN AN ORGANIZED HEALTH CARE EDUCATION/TRAINING PROGRAM

## 2025-02-11 PROCEDURE — 95910 NRV CNDJ TEST 7-8 STUDIES: CPT | Mod: 26,S$PBB,, | Performed by: STUDENT IN AN ORGANIZED HEALTH CARE EDUCATION/TRAINING PROGRAM

## 2025-02-11 PROCEDURE — 99499 UNLISTED E&M SERVICE: CPT | Mod: S$PBB,,, | Performed by: STUDENT IN AN ORGANIZED HEALTH CARE EDUCATION/TRAINING PROGRAM

## 2025-02-11 PROCEDURE — 99999 PR PBB SHADOW E&M-EST. PATIENT-LVL II: CPT | Mod: PBBFAC,,, | Performed by: STUDENT IN AN ORGANIZED HEALTH CARE EDUCATION/TRAINING PROGRAM

## 2025-02-11 PROCEDURE — 99212 OFFICE O/P EST SF 10 MIN: CPT | Mod: PBBFAC,25,27 | Performed by: STUDENT IN AN ORGANIZED HEALTH CARE EDUCATION/TRAINING PROGRAM

## 2025-02-11 NOTE — PATIENT INSTRUCTIONS
Check with your pharmacist regarding shingrix vaccine.     Check with your pharmacist regarding RSV vaccine.

## 2025-02-11 NOTE — PROGRESS NOTES
Subjective:       Patient ID: Neymar Victoria is a 74 y.o. male.    Chief Complaint: Hypertension and Follow-up    History of Present Illness    Patient presents to clinic today for followup of chronic conditions.  - Patient reports difficulty walking with weakness and wobbliness in his legs, which began suddenly on January 15th, 2025, during a stress test with his cardiologist. Within 15 seconds of starting the treadmill, he felt abnormal and had to stop the test after 2 minutes due to his legs feeling unstable, weak, and nearly collapsing. Since then, the patient has been unable to walk more than short distances, such as from his current location to his car. He describes his legs as constantly weak and unstable, preventing normal walking.  - Patient attributes sleep disturbances to his current medications. He takes his blood pressure medications (metoprolol and amlodipine/valsartan) at 10 PM as prescribed by Dr. Callahan. He can go to bed at 10:30 PM after taking the medicine but wakes up at 2 AM. When previously taking these medications in the morning and evening, he had unstable legs and felt unwell about 1.5 to 2 hours after taking them.  - Patient recently underwent x-rays of his back, showing some degenerative changes and narrowing of the discs. He is scheduled for nerve conduction studies later today and has an appointment with Dr. Snatoyo, a spine specialist, on May 5th for neck pain. Patient reports occasional neck pain in addition to his back issues. His cardiologist referred him to Dr. Santoyo.  - Patient's blood pressure has been running between 130 to 140 when checked at home.  - Patient mentions a history of constipation, for which he was previously prescribed lactulose. He has not needed to use it in the last 5 days.  - Patient denies any falls, bowel or bladder incontinence.  Patient is otherwise without concerns today.    ROS:  General: -fever, -chills, -fatigue, -weight gain, -weight loss  Eyes: -eye pain,  "-vision change  ENMT: -hearing loss, -ear pain, -nasal congestion, -rhinorrhea, -dental problem, -trouble swallowing  Cardiovascular: -chest pain, -palpitations, -lower extremity edema  Respiratory: -cough, -trouble breathing  Gastrointestinal: -abdominal pain, -abdominal swelling, -nausea, -vomiting, -diarrhea, +CONSTIPATION, -blood in stool  Genitourinary: -difficulty urinating, -genital problem, -urinary incontinence  Musculoskeletal: -joint pain, -muscle aches, +NECK PAIN, -muscle weakness  Integumentary: -rash  Lymphatics: -swollen glands, -easy bruising  Neurologic: -headache, -dizziness, -numbness, +WEAKNESS  Psychiatric: -anxiety, -depression, +SLEEP DIFFICULTY     Objective:     Vitals:    02/11/25 0809   BP: 132/74   BP Location: Left arm   Patient Position: Sitting   Pulse: 68   Resp: 16   Temp: 97.4 °F (36.3 °C)   TempSrc: Tympanic   SpO2: 96%   Weight: 72 kg (158 lb 11.7 oz)   Height: 5' 6" (1.676 m)      Physical Exam  Vitals reviewed.   Constitutional:       General: He is not in acute distress.     Appearance: He is well-developed.   HENT:      Head: Normocephalic and atraumatic.   Eyes:      General: Lids are normal. No scleral icterus.     Extraocular Movements: Extraocular movements intact.      Conjunctiva/sclera: Conjunctivae normal.      Pupils: Pupils are equal, round, and reactive to light.   Cardiovascular:      Rate and Rhythm: Normal rate and regular rhythm.      Heart sounds: No murmur heard.     No friction rub. No gallop.   Pulmonary:      Effort: Pulmonary effort is normal.      Breath sounds: Normal breath sounds. No decreased breath sounds, wheezing, rhonchi or rales.   Neurological:      Mental Status: He is alert and oriented to person, place, and time.      Cranial Nerves: No cranial nerve deficit.      Gait: Gait normal.      Comments: Patient observed ambulating up and down hallway without assistive device. Walks at a reasonable pace for his age.   Psychiatric:         Mood and " Affect: Mood and affect normal.         Lab Results   Component Value Date     02/07/2025    K 4.2 02/07/2025     02/07/2025    CO2 25 02/07/2025    GLU 92 02/07/2025    BUN 11 02/07/2025    CREATININE 0.8 02/07/2025    CALCIUM 11.0 (H) 02/07/2025    PROT 7.5 02/07/2025    ALBUMIN 3.3 (L) 02/07/2025    BILITOT 0.6 02/07/2025    ALKPHOS 86 02/07/2025    AST 22 02/07/2025    ALT 31 02/07/2025    EGFRNORACEVR >60.0 02/07/2025    ANIONGAP 9 02/07/2025       Lab Results   Component Value Date    CHOL 129 02/07/2025    TRIG 92 02/07/2025    HDL 29 (L) 02/07/2025    LDLCALC 81.6 02/07/2025       Assessment:       1. Essential hypertension    2. Hyperlipidemia with target low density lipoprotein (LDL) cholesterol less than 160 mg/dL    3. Vitamin B12 deficiency    4. Paresthesias    5. Lumbar disc disease    6. Hypercalcemia        Plan:   1. Essential hypertension  -     TSH; Future; Expected date: 08/11/2025  -     CBC Auto Differential; Future; Expected date: 08/11/2025    2. Hyperlipidemia with target low density lipoprotein (LDL) cholesterol less than 160 mg/dL  -     Comprehensive Metabolic Panel; Future; Expected date: 08/11/2025  -     Lipid Panel; Future; Expected date: 08/11/2025    3. Vitamin B12 deficiency  Overview:  Continue supplement     Orders:  -     Vitamin B12; Future; Expected date: 08/11/2025    4. Paresthesias    5. Lumbar disc disease    6. Hypercalcemia  -     Calcium; Future; Expected date: 03/11/2025      Assessment & Plan    HYPERTENSION:   Assessed chronic conditions: blood pressure is well-controlled.   Noted the patient checks blood pressure at home, running about 130 to 140.   Continued current medications: amlodipine-valsartan, and metoprolol for blood pressure management.   Advised the patient to message Dr. Callahan through patient portal regarding sleep disturbance potentially related to evening dosing of blood pressure medications.   Suggested discussing with cardiologist about  changing medication timing due to sleep disturbance.    HYPERLIPIDEMIA:   Assessed chronic conditions: cholesterol is acceptable, with a slight drop in HDL.   Suggested decreased activity might explain HDL drop.    CONSTIPATION:   Evaluated constipation: recommended Miralax over previously prescribed lactulose due to lower risk of electrolyte disturbances.   Continued Miralax for constipation, emphasizing adequate hydration.   Explained Miralax as an osmotic laxative, emphasizing importance of adequate hydration for effectiveness.   Noted the patient has not needed lactulose in the last 5 days and constipation seems to be improving.    PARESTHESIAS:   Educated on signs warranting emergency care: worsening numbness/tingling, bowel/bladder incontinence.   Nerve conduction study scheduled to evaluate paresthesias.    SPINAL DEGENERATION:   Reviewed recent x-ray showing mildly progressed degenerative changes and narrowing of the discs in the spine since 2012.   Patient is scheduled to see Dr. Santoyo (spine specialist) for evaluation of back and neck pain.   Will attempt to expedite appointment with Dr. Santoyo or another provider in the department.    FALL RISK:   Assessed fall risk and considered mobility aid options.   Noted patient reports weakness and wobbliness in legs, limiting walking distance, with symptoms starting suddenly on January 15th during a stress test.   Offered cane for temporary use to reduce fall risk. He declines, but reports having access to cane at home.   Advised the patient to consider using a cane for stability and fall prevention.   Instructed the patient to seek medical attention if falls occur.    ELEVATED CALCIUM:   Noted elevated calcium, to be rechecked in 1 month for monitoring.   Discussed potential causes of elevated calcium levels, including bone metabolism issues, liver problems, and rarely, malignancies.   Noted previous vitamin D and parathyroid tests were normal.   Scheduled follow  up in 1 month for calcium level recheck.    Patient expressed understanding and agreement with plan.    Visit today included increased complexity associated with the care of the episodic problem leg weakness, which was addressed while instituting co-management of the longitudinal care of the patient due to the serious and/or complex managed problem(s) .    I have evaluated and discussed management associated with medical care services that serve as the continuing focal point for all needed health care services and/or with medical care services that are part of ongoing care related to my patient's single, serious condition or a complex condition(s).    I am providing ongoing care and I am the primary care provider for this patient, and they are being managed, monitored, and/or observed for their chronic conditions over time.     I have addressed their ongoing health maintenance requirements and needs for all health care services and reviewed co-management plans provided by specialty providers when available.    Health Maintenance Due   Topic Date Due    Shingles Vaccine (1 of 2) Never done    RSV Vaccine (Age 60+ and Pregnant patients) (1 - Risk 60-74 years 1-dose series) Never done     Health Maintenance reviewed/updated.    Follow up in about 6 months (around 8/11/2025), or if symptoms worsen or fail to improve, for annual with Olga HORTON, labs PTA.    This note was generated with the assistance of ambient listening technology. Verbal consent was obtained by the patient and accompanying visitor(s) for the recording of patient appointment to facilitate this note. I attest to having reviewed and edited the generated note for accuracy, though some syntax or spelling errors may persist. Please contact the author of this note for any clarification.

## 2025-02-11 NOTE — PROGRESS NOTES
"[facility]  Dept of Physical Medicine and Rehabilitation        Full Name: Neymar Victoria Gender: Male  Patient ID: [id number] YOB: 1950      Visit Date: 2/11/2025 10:14 AM  Age: 74 Years  Examining Physician: Yeison Davidson DO  Referring Physician: Kumar  Height: 5 feet 0 inch  Weight: 158 lbs    02/11/2025    Referred by: Caroline Park PA*    Referral history: Clinical concern for neuropathy    Subjective:    Neymar Victoria is a 74 y.o. male who is referred for electrodiagnostic evaluation with complaint of lower leg burning pain and "paresthesias."    Onset/ duration: after treadmill stress test. Felt "wobbly" and reports difficulty with balance in addition to burning pain in the bottom of the feet.   Progression: unchanged  Distribution: bottom of feet  Associated with:  - pain  - Weakness: yes; unsteadiness    Relevant functional history:   Used to hunt and fish, now struggling to walk community distances    Past Medical History:   Diagnosis Date    Arthritis     Early dry stage nonexudative age-related macular degeneration of both eyes 9/28/2020    Erectile dysfunction     Hyperlipidemia LDL goal < 160     Hypertension     Seasonal allergies      Relevant trauma: no    PSHx:  Past Surgical History:   Procedure Laterality Date    CATARACT EXTRACTION W/ INTRAOCULAR LENS IMPLANT Right 06/10/2021    COLONOSCOPY N/A 02/29/2016    Procedure: COLONOSCOPY;  Surgeon: Aruna oRcha MD;  Location: [facility];  Service: Endoscopy;  Laterality: N/A;    COLONOSCOPY N/A 06/07/2019    Procedure: COLONOSCOPY;  Surgeon: Jacobo Sigala MD;  Location: [facility];  Service: Endoscopy;  Laterality: N/A;    COLONOSCOPY N/A 09/09/2022    Procedure: COLONOSCOPY;  Surgeon: Jacobo Sigala MD;  Location: [facility];  Service: General;  Laterality: N/A;    WISDOM TOOTH EXTRACTION         Social Hx:  Tobacco Use: High Risk (2/11/2025)    Patient History     Smoking Tobacco Use: Every Day     " Smokeless Tobacco Use: Never     Passive Exposure: Not on file     Alcohol Use: Not At Risk (1/24/2025)    AUDIT-C     Frequency of Alcohol Consumption: Never     Average Number of Drinks: Patient does not drink     Frequency of Binge Drinking: Never       Family History:  Known neuromuscular disease: no    Review of Systems   MSK: + intermittent low back pain. Non radiating. Denies leg pain    Clinical Examination Findings:  There were no vitals filed for this visit.    General: Patient is alert, fully oriented and cooperative. No apparent distress  Inspection:  Muscle atrophy: none  Extremity swelling/ edema: yes; trace pitting edema bilateral ankles  Sensory examination: reports normal and symmetrical appreciation of light touch in the lower extremities.  There was no dermatomal or peripheral nerve pattern of sensory loss.    Manual Muscle Testing    Lower Extremity Right Left   Hip flexion 4 5   Knee extension 5 5   Ankle dorsiflexion 5 5   Long toe extension 5 5   Ankle plantarflexion 5 5     Reflexes symmetric throughout the bilateral lower extremities    Clonus: no  Straight leg raise: negative    Summary of Electrodiagnostic Findings    Sensory NCS      Nerve / Sites Rec. Site Onset Lat Peak Lat Amp Segments Distance Velocity Temp. Comment     ms ms µV  cm m/s °C    R Sural - (Antidromic)      Calf Ankle NR NR NR Calf - Ankle 14 NR 27.4    L Sural - (Antidromic)      Calf Ankle 3.54 4.17 3.9 Calf - Ankle 14 40 28.3    R Radial - Superficial (Antidromic)      Forearm Wrist 1.98 2.34 11.3 Forearm - Wrist 10 51 28.8        Motor NCS      Nerve / Sites Muscle Latency Amplitude Segments Dist. Lat Diff Velocity Temp. Comments     ms mV  cm ms m/s °C    R Peroneal - EDB      Ankle EDB NR NR Ankle - EDB 8   27.9    R Peroneal - Tib Ant      Fib Head Tib Ant 2.35 2.7 Fib Head - Tib Ant    25.9       Pop fossa Tib Ant 4.44 1.5 Pop fossa - Fib Head 10 2.08 48.0 24.7    L Peroneal - Tib Ant      Fib Head Tib Ant 2.73  1.9 Fib Head - Tib Ant    29.2       Pop fossa Tib Ant 4.52 1.4 Pop fossa - Fib Head 7 1.79 39.1 29    L Tibial - AH      Ankle AH 4.77 10.4 Ankle - AH 8   24.2       Knee AH 12.35 7.7 Knee - Ankle 32 7.58 42.2 23.8        F  Wave      Nerve F Latency M Latency F - M Lat    ms ms ms   L Tibial - AH 49.1 4.6 44.5       Summary    The motor conduction test was performed on 5 nerve(s). The results were normal in 1 nerve(s): L Tibial - AH. Findings were unremarkable in 3 nerve(s): R Peroneal - Tib Ant, L Peroneal - EDB, L Peroneal - Tib Ant. Results outside the specified normal range were found in 1 nerve(s), as follows:  In the R Peroneal - EDB study  the response was considered absent for Ankle stimulation    The sensory conduction test was performed on 3 nerve(s). The results were normal in 1 nerve(s): R Radial - Superficial (Antidromic). Results outside the specified normal range were found in 2 nerve(s), as follows:  In the R Sural - (Antidromic) study  the response was considered absent for Calf stimulation  In the L Sural - (Antidromic) study  the peak amplitude result was reduced for Calf stimulation    Sural : radial ratio 0.35  The F wave study was unremarkable in all 1 of the tested nerves: L Tibial - AH      Limitations: ankle swelling, slightly cool limb (29c), declined needle study    Clinical and Electrodiagnostic Impression:  1. abnormal study  2. 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   3. Normal F wave makes radiculopathy less likely. Declined needle study, thus limited additional evaluation for radiculopathy screening.     Further Recommendations:  1. Follow-up with CLIFFORD Salinas for management and additional workup for peripheral polyneuropathy and proximal limb weakness (R).       Yeison POLANCO  Lety  02/11/2025  [facility]

## 2025-02-19 NOTE — H&P
[facility] ([facility])  Colon and Rectal Surgery  History & Physical    Patient Name: Neymar Victoria  MRN: [id number]  Admission Date: 9/9/2022  Attending Physician: Jacobo Sigala MD  Primary Care Provider: Susan Chávez MD    Patient information was obtained from patient and medical records.    Subjective:     Chief Complaint/Reason for Admission: Here for Colonoscopy    History of Present Illness:  Patient is a 72 y.o. male presents for colonoscopy. Last cscope in 2019 with multiple adenomas removed. No current hematochezia, melena or change in bowel habits. No personal or fam hx of CRC or IBD.    No current facility-administered medications on file prior to encounter.     Current Outpatient Medications on File Prior to Encounter   Medication Sig    amlodipine-valsartan (EXFORGE)  mg per tablet TAKE 1 TABLET BY MOUTH EVERY DAY    cyanocobalamin (VITAMIN B-12) 1000 MCG tablet Take 1 tablet (1,000 mcg total) by mouth once daily.    hydroCHLOROthiazide (HYDRODIURIL) 25 MG tablet TAKE 1 TABLET BY MOUTH EVERY DAY    metoprolol succinate (TOPROL-XL) 200 MG 24 hr tablet TAKE 1 TABLET BY MOUTH EVERY DAY IN THE EVENING    PROPYLENE GLYCOL/ (SYSTANE OPHT) Apply to eye.    tadalafiL (CIALIS) 10 MG tablet 1 tablet at least 30 minutes prior to sexual activity; not more than once daily       Review of patient's allergies indicates:   Allergen Reactions    Statins-hmg-coa reductase inhibitors      Myalgia       Past Medical History:   Diagnosis Date    Arthritis     Early dry stage nonexudative age-related macular degeneration of both eyes 9/28/2020    Erectile dysfunction     Hyperlipidemia LDL goal < 160     Hypertension     Seasonal allergies      Past Surgical History:   Procedure Laterality Date    CATARACT EXTRACTION W/ INTRAOCULAR LENS IMPLANT Right 06/10/2021    COLONOSCOPY N/A 2/29/2016    Procedure: COLONOSCOPY;  Surgeon: Aruna Rocha MD;  Location: [facility];  Service: Endoscopy;   Patient's blood pressure range in the last 24 hours was: BP  Min: 127/58  Max: 176/91.The patient's inpatient anti-hypertensive regimen is listed below:  Current Antihypertensives  carvediloL tablet 12.5 mg, 2 times daily with meals, Oral  lisinopriL tablet 10 mg, Daily, Oral    Plan  - BP is controlled, no changes needed to their regimen   Laterality: N/A;    COLONOSCOPY N/A 6/7/2019    Procedure: COLONOSCOPY;  Surgeon: Jacobo Sigala MD;  Location: [facility];  Service: Endoscopy;  Laterality: N/A;    WISDOM TOOTH EXTRACTION       Family History       Problem Relation (Age of Onset)    Arthritis Mother    Cancer Mother, Father    Hypertension     Macular degeneration Mother    Melanoma Father    Stroke Paternal Grandfather    Vision loss Mother          Tobacco Use    Smoking status: Every Day     Packs/day: 0.50     Years: 30.00     Pack years: 15.00     Types: Cigarettes    Smokeless tobacco: Never   Substance and Sexual Activity    Alcohol use: Never     Alcohol/week: 0.0 standard drinks     Comment: social use of alchol    Drug use: No    Sexual activity: Yes     Partners: Female     Birth control/protection: None     Review of Systems   Constitutional:  Negative for activity change, appetite change, chills, fatigue, fever and unexpected weight change.   HENT:  Negative for congestion, ear pain, sore throat and trouble swallowing.    Eyes:  Negative for pain, redness and itching.   Respiratory:  Negative for cough, shortness of breath and wheezing.    Cardiovascular:  Negative for chest pain, palpitations and leg swelling.   Gastrointestinal:  Negative for abdominal distention, abdominal pain, anal bleeding, blood in stool, constipation, diarrhea, nausea, rectal pain and vomiting.   Endocrine: Negative for cold intolerance, heat intolerance and polyuria.   Genitourinary:  Negative for dysuria, flank pain, frequency and hematuria.   Musculoskeletal:  Negative for gait problem, joint swelling and neck pain.   Skin:  Negative for color change, rash and wound.   Allergic/Immunologic: Negative for environmental allergies and immunocompromised state.   Neurological:  Negative for dizziness, speech difficulty, weakness and numbness.   Psychiatric/Behavioral:  Negative for agitation, confusion and hallucinations.    Objective:     Vital Signs (Most  Recent):  Temp: 98.1 °F (36.7 °C) (09/09/22 0702)  Pulse: 67 (09/09/22 0702)  Resp: 19 (09/09/22 0702)  BP: (!) 123/58 (09/09/22 0702)  SpO2: 96 % (room air) (09/09/22 0702)   Vital Signs (24h Range):  Temp:  [98.1 °F (36.7 °C)] 98.1 °F (36.7 °C)  Pulse:  [67] 67  Resp:  [19] 19  SpO2:  [96 %] 96 %  BP: (123)/(58) 123/58     Weight: 79.8 kg (176 lb)  Body mass index is 28.41 kg/m².    Physical Exam  Constitutional:       Appearance: He is well-developed.   HENT:      Head: Normocephalic and atraumatic.   Eyes:      Conjunctiva/sclera: Conjunctivae normal.   Neck:      Thyroid: No thyromegaly.   Cardiovascular:      Rate and Rhythm: Normal rate and regular rhythm.   Pulmonary:      Effort: Pulmonary effort is normal. No respiratory distress.   Abdominal:      General: There is no distension.      Palpations: Abdomen is soft. There is no mass.      Tenderness: There is no abdominal tenderness.   Musculoskeletal:         General: No tenderness. Normal range of motion.      Cervical back: Normal range of motion.   Skin:     General: Skin is warm and dry.      Capillary Refill: Capillary refill takes less than 2 seconds.      Findings: No rash.   Neurological:      Mental Status: He is alert and oriented to person, place, and time.         Assessment/Plan:     Patient is a 72 y.o. male who presents for colonoscopy     - Ok to proceed to endoscopy suite for colonoscopy  - Consent obtained. All risks, benefits and alternatives fully explained to patient, including but not limited to bleeding, infection, perforation, and missed polyps. All questions appropriately answered to patient's satisfaction. Consent signed and placed on chart.    There are no hospital problems to display for this patient.    VTE Risk Mitigation (From admission, onward)      None            Jacobo Sigala MD  Colon and Rectal Surgery  [facility] - Endoscopy ([facility])

## 2025-02-21 DIAGNOSIS — Z00.00 ENCOUNTER FOR MEDICARE ANNUAL WELLNESS EXAM: ICD-10-CM

## 2025-02-24 ENCOUNTER — TELEPHONE (OUTPATIENT)
Dept: HEMATOLOGY/ONCOLOGY | Facility: CLINIC | Age: 75
End: 2025-02-24
Payer: MEDICARE

## 2025-02-24 ENCOUNTER — HOSPITAL ENCOUNTER (EMERGENCY)
Facility: HOSPITAL | Age: 75
Discharge: HOME | End: 2025-02-24
Attending: EMERGENCY MEDICINE
Payer: MEDICARE

## 2025-02-24 VITALS
DIASTOLIC BLOOD PRESSURE: 65 MMHG | SYSTOLIC BLOOD PRESSURE: 145 MMHG | TEMPERATURE: 98 F | WEIGHT: 156.19 LBS | OXYGEN SATURATION: 97 % | RESPIRATION RATE: 22 BRPM | HEART RATE: 68 BPM | BODY MASS INDEX: 25.21 KG/M2

## 2025-02-24 DIAGNOSIS — N28.89 RENAL MASS: Primary | ICD-10-CM

## 2025-02-24 DIAGNOSIS — R31.9 URINARY TRACT INFECTION WITH HEMATURIA, SITE UNSPECIFIED: ICD-10-CM

## 2025-02-24 DIAGNOSIS — M25.559 HIP PAIN: ICD-10-CM

## 2025-02-24 DIAGNOSIS — N39.0 URINARY TRACT INFECTION WITH HEMATURIA, SITE UNSPECIFIED: ICD-10-CM

## 2025-02-24 DIAGNOSIS — R91.8 PULMONARY NODULES: ICD-10-CM

## 2025-02-24 LAB
ALBUMIN SERPL BCP-MCNC: 3.1 G/DL (ref 3.5–5.2)
ALP SERPL-CCNC: 101 U/L (ref 40–150)
ALT SERPL W/O P-5'-P-CCNC: 25 U/L (ref 10–44)
ANION GAP SERPL CALC-SCNC: 13 MMOL/L (ref 8–16)
AST SERPL-CCNC: 18 U/L (ref 10–40)
BACTERIA #/AREA URNS HPF: ABNORMAL /HPF
BASOPHILS # BLD AUTO: 0.02 K/UL (ref 0–0.2)
BASOPHILS NFR BLD: 0.2 % (ref 0–1.9)
BILIRUB SERPL-MCNC: 0.6 MG/DL (ref 0.1–1)
BILIRUB UR QL STRIP: NEGATIVE
BUN SERPL-MCNC: 14 MG/DL (ref 8–23)
CALCIUM SERPL-MCNC: 12.6 MG/DL (ref 8.7–10.5)
CHLORIDE SERPL-SCNC: 101 MMOL/L (ref 95–110)
CK SERPL-CCNC: 17 U/L (ref 20–200)
CLARITY UR: ABNORMAL
CO2 SERPL-SCNC: 23 MMOL/L (ref 23–29)
COLOR UR: YELLOW
CREAT SERPL-MCNC: 0.9 MG/DL (ref 0.5–1.4)
CRP SERPL-MCNC: 110.6 MG/L (ref 0–8.2)
DIFFERENTIAL METHOD BLD: ABNORMAL
EOSINOPHIL # BLD AUTO: 0 K/UL (ref 0–0.5)
EOSINOPHIL NFR BLD: 0.2 % (ref 0–8)
ERYTHROCYTE [DISTWIDTH] IN BLOOD BY AUTOMATED COUNT: 13.5 % (ref 11.5–14.5)
ERYTHROCYTE [SEDIMENTATION RATE] IN BLOOD BY WESTERGREN METHOD: 114 MM/HR (ref 0–10)
EST. GFR  (NO RACE VARIABLE): >60 ML/MIN/1.73 M^2
GLUCOSE SERPL-MCNC: 117 MG/DL (ref 70–110)
GLUCOSE UR QL STRIP: NEGATIVE
HCT VFR BLD AUTO: 35.9 % (ref 40–54)
HGB BLD-MCNC: 11.4 G/DL (ref 14–18)
HGB UR QL STRIP: ABNORMAL
HYALINE CASTS #/AREA URNS LPF: 0 /LPF
IMM GRANULOCYTES # BLD AUTO: 0.02 K/UL (ref 0–0.04)
IMM GRANULOCYTES NFR BLD AUTO: 0.2 % (ref 0–0.5)
KETONES UR QL STRIP: NEGATIVE
LDH SERPL L TO P-CCNC: 156 U/L (ref 110–260)
LEUKOCYTE ESTERASE UR QL STRIP: ABNORMAL
LYMPHOCYTES # BLD AUTO: 0.8 K/UL (ref 1–4.8)
LYMPHOCYTES NFR BLD: 9.6 % (ref 18–48)
MAGNESIUM SERPL-MCNC: 2.2 MG/DL (ref 1.6–2.6)
MCH RBC QN AUTO: 27.2 PG (ref 27–31)
MCHC RBC AUTO-ENTMCNC: 31.8 G/DL (ref 32–36)
MCV RBC AUTO: 86 FL (ref 82–98)
MICROSCOPIC COMMENT: ABNORMAL
MONOCYTES # BLD AUTO: 0.7 K/UL (ref 0.3–1)
MONOCYTES NFR BLD: 8.8 % (ref 4–15)
NEUTROPHILS # BLD AUTO: 6.8 K/UL (ref 1.8–7.7)
NEUTROPHILS NFR BLD: 81 % (ref 38–73)
NITRITE UR QL STRIP: NEGATIVE
NRBC BLD-RTO: 0 /100 WBC
PH UR STRIP: 6 [PH] (ref 5–8)
PLATELET # BLD AUTO: 342 K/UL (ref 150–450)
PMV BLD AUTO: 8.8 FL (ref 9.2–12.9)
POTASSIUM SERPL-SCNC: 3.6 MMOL/L (ref 3.5–5.1)
PROT SERPL-MCNC: 7.3 G/DL (ref 6–8.4)
PROT UR QL STRIP: ABNORMAL
RBC # BLD AUTO: 4.19 M/UL (ref 4.6–6.2)
RBC #/AREA URNS HPF: >100 /HPF (ref 0–4)
SODIUM SERPL-SCNC: 137 MMOL/L (ref 136–145)
SP GR UR STRIP: 1.01 (ref 1–1.03)
URN SPEC COLLECT METH UR: ABNORMAL
UROBILINOGEN UR STRIP-ACNC: NEGATIVE EU/DL
WBC # BLD AUTO: 8.45 K/UL (ref 3.9–12.7)
WBC #/AREA URNS HPF: 19 /HPF (ref 0–5)

## 2025-02-24 PROCEDURE — 81000 URINALYSIS NONAUTO W/SCOPE: CPT | Performed by: EMERGENCY MEDICINE

## 2025-02-24 PROCEDURE — 85651 RBC SED RATE NONAUTOMATED: CPT | Performed by: EMERGENCY MEDICINE

## 2025-02-24 PROCEDURE — 99285 EMERGENCY DEPT VISIT HI MDM: CPT | Mod: 25

## 2025-02-24 PROCEDURE — 87086 URINE CULTURE/COLONY COUNT: CPT | Performed by: EMERGENCY MEDICINE

## 2025-02-24 PROCEDURE — 80053 COMPREHEN METABOLIC PANEL: CPT | Performed by: EMERGENCY MEDICINE

## 2025-02-24 PROCEDURE — 25500020 PHARM REV CODE 255: Performed by: EMERGENCY MEDICINE

## 2025-02-24 PROCEDURE — 83735 ASSAY OF MAGNESIUM: CPT | Performed by: EMERGENCY MEDICINE

## 2025-02-24 PROCEDURE — 82550 ASSAY OF CK (CPK): CPT | Performed by: EMERGENCY MEDICINE

## 2025-02-24 PROCEDURE — 83615 LACTATE (LD) (LDH) ENZYME: CPT | Performed by: EMERGENCY MEDICINE

## 2025-02-24 PROCEDURE — 85025 COMPLETE CBC W/AUTO DIFF WBC: CPT | Performed by: EMERGENCY MEDICINE

## 2025-02-24 PROCEDURE — 86140 C-REACTIVE PROTEIN: CPT | Performed by: EMERGENCY MEDICINE

## 2025-02-24 RX ORDER — CEPHALEXIN 500 MG/1
500 CAPSULE ORAL 4 TIMES DAILY
Qty: 28 CAPSULE | Refills: 0 | Status: SHIPPED | OUTPATIENT
Start: 2025-02-24 | End: 2025-03-03

## 2025-02-24 RX ADMIN — IOHEXOL 100 ML: 350 INJECTION, SOLUTION INTRAVENOUS at 10:02

## 2025-02-24 NOTE — ED PROVIDER NOTES
SCRIBE #1 NOTE: I, Teddy Yoder, am scribing for, and in the presence of, Darion Parker DO. I have scribed the entire note.       History     Chief Complaint   Patient presents with    Extremity Weakness     Bilateral leg weakness. Pt repots he developed weakness to both legs after he did a treadmill stress test on 1/15/25. Unrelated pt also has right lower quadrant abdominal pain and constipation for 2 weeks. Takes a laxative as needed but states it's not helping. Has seen PCP for both issues.     Review of patient's allergies indicates:   Allergen Reactions    Statins-hmg-coa reductase inhibitors      Myalgia         History of Present Illness     HPI    2/24/2025, 9:39 AM  History obtained from the patient and medical records      History of Present Illness: Neymar Victoria is a 74 y.o. male patient with a PMHx of HTN, HLD, and arthritis who presents to the Emergency Department for evaluation of RLQ abdominal pain which began the past two weeks. Pt states he has been constipated as well. Pt states the abdominal pain radiates to his right hip. Pt states he has not been eating because of no appetite. Pt states he has also been losing weight. Pt is also complaining of bilateral leg weakness. Pt reports the sxs started after a treadmill stress test on 1/15/25. Pt states he has seen his PCP and physical medicine for the same thing. Symptoms are constant and moderate in severity. No mitigating or exacerbating factors reported. Patient denies any vomiting. No prior Tx specified.  No further complaints or concerns at this time.       Arrival mode: Personal Transportation    PCP: Susan Chávez MD        Past Medical History:  Past Medical History:   Diagnosis Date    Arthritis     Early dry stage nonexudative age-related macular degeneration of both eyes 9/28/2020    Erectile dysfunction     Hyperlipidemia LDL goal < 160     Hypertension     Seasonal allergies        Past Surgical History:  Past Surgical  History:   Procedure Laterality Date    CATARACT EXTRACTION W/ INTRAOCULAR LENS IMPLANT Right 06/10/2021    COLONOSCOPY N/A 02/29/2016    Procedure: COLONOSCOPY;  Surgeon: Aruna Rocha MD;  Location: [facility] ENDO;  Service: Endoscopy;  Laterality: N/A;    COLONOSCOPY N/A 06/07/2019    Procedure: COLONOSCOPY;  Surgeon: Jacobo Sigala MD;  Location: [facility] ENDO;  Service: Endoscopy;  Laterality: N/A;    COLONOSCOPY N/A 09/09/2022    Procedure: COLONOSCOPY;  Surgeon: Jacobo Sigala MD;  Location: [facility] ENDO;  Service: General;  Laterality: N/A;    WISDOM TOOTH EXTRACTION           Family History:  Family History   Problem Relation Name Age of Onset    Macular degeneration Mother Johan Victoria     Arthritis Mother Johan Victoria     Cancer Mother Johan Victoria     Vision loss Mother Johan Victoria     Melanoma Father Nilesh Victoria     Cancer Father Nilesh Victoria     Hypertension Unknown      Stroke Paternal Grandfather Neymar Victoria     Psoriasis Neg Hx      Lupus Neg Hx         Social History:  Social History     Tobacco Use    Smoking status: Every Day     Current packs/day: 0.50     Average packs/day: 0.5 packs/day for 30.0 years (15.0 ttl pk-yrs)     Types: Cigarettes    Smokeless tobacco: Never   Substance and Sexual Activity    Alcohol use: Never     Comment: social use of alchol    Drug use: No    Sexual activity: Not Currently     Partners: Female     Birth control/protection: None        Review of Systems     Review of Systems   Constitutional:  Positive for appetite change and unexpected weight change. Negative for fever.   HENT:  Negative for sore throat.    Respiratory:  Negative for shortness of breath.    Cardiovascular:  Negative for chest pain.   Gastrointestinal:  Positive for abdominal pain (RLQ) and constipation. Negative for nausea and vomiting.   Genitourinary:  Negative for dysuria.   Musculoskeletal:  Positive for arthralgias (right hip). Negative for back pain.   Skin:  Negative  for rash.   Neurological:  Positive for weakness (BLE).   Hematological:  Does not bruise/bleed easily.   All other systems reviewed and are negative.     Physical Exam     Initial Vitals [02/24/25 0920]   BP Pulse Resp Temp SpO2   (!) 147/67 68 16 97.9 °F (36.6 °C) 96 %      MAP       --          Physical Exam  Vitals reviewed.   Constitutional:       Appearance: Normal appearance.   Cardiovascular:      Rate and Rhythm: Normal rate and regular rhythm.      Heart sounds: No murmur heard.  Pulmonary:      Effort: Pulmonary effort is normal.      Breath sounds: No wheezing.   Abdominal:      Palpations: Abdomen is soft.      Tenderness: There is no abdominal tenderness.   Musculoskeletal:         General: Normal range of motion.      Comments: 5/5 muscle strength to the bilateral upper and lower extremities   Skin:     General: Skin is warm and dry.      Findings: No rash.   Neurological:      General: No focal deficit present.      Mental Status: He is alert and oriented to person, place, and time.      Sensory: No sensory deficit.      Motor: No weakness.       Nursing Notes and Vital Signs Reviewed.     ED Course   Critical Care    Date/Time: 2/24/2025 1:15 PM    Performed by: Darion Parker DO  Authorized by: Darion Parker DO  Direct patient critical care time: 20 minutes  Ordering / reviewing critical care time: 5 minutes  Documentation critical care time: 5 minutes  Consulting other physicians critical care time: 5 minutes  Total critical care time (exclusive of procedural time) : 35 minutes  Critical care time was exclusive of separately billable procedures and treating other patients.  Critical care was necessary to treat or prevent imminent or life-threatening deterioration of the following conditions: Newly discovered renal cell carcinoma with suspected Mets.  Critical care was time spent personally by me on the following activities: discussions with consultants, ordering and performing  treatments and interventions, ordering and review of laboratory studies, ordering and review of radiographic studies and re-evaluation of patient's condition.        ED Vital Signs:  Vitals:    02/24/25 0920 02/24/25 0930 02/24/25 1000 02/24/25 1137   BP: (!) 147/67 (!) 159/67 (!) 154/67 (!) 136/58   Pulse: 68 67 68 67   Resp: 16 18 18 20   Temp: 97.9 °F (36.6 °C)      TempSrc: Oral      SpO2: 96% 96% 95% 96%   Weight: 70.9 kg (156 lb 3.2 oz)       02/24/25 1330   BP: (!) 145/65   Pulse: 68   Resp: (!) 22   Temp:    TempSrc:    SpO2: 97%   Weight:        Abnormal Lab Results:  Labs Reviewed   CBC W/ AUTO DIFFERENTIAL - Abnormal       Result Value    WBC 8.45      RBC 4.19 (*)     Hemoglobin 11.4 (*)     Hematocrit 35.9 (*)     MCV 86      MCH 27.2      MCHC 31.8 (*)     RDW 13.5      Platelets 342      MPV 8.8 (*)     Immature Granulocytes 0.2      Gran # (ANC) 6.8      Immature Grans (Abs) 0.02      Lymph # 0.8 (*)     Mono # 0.7      Eos # 0.0      Baso # 0.02      nRBC 0      Gran % 81.0 (*)     Lymph % 9.6 (*)     Mono % 8.8      Eosinophil % 0.2      Basophil % 0.2      Differential Method Automated     COMPREHENSIVE METABOLIC PANEL - Abnormal    Sodium 137      Potassium 3.6      Chloride 101      CO2 23      Glucose 117 (*)     BUN 14      Creatinine 0.9      Calcium 12.6 (*)     Total Protein 7.3      Albumin 3.1 (*)     Total Bilirubin 0.6      Alkaline Phosphatase 101      AST 18      ALT 25      eGFR >60      Anion Gap 13      Narrative:      CALCIUM  critical result(s) called and verbal readback obtained from   BRANDO MCCLENDON RN by JESSICA 02/24/2025 10:34   CK - Abnormal    CPK 17 (*)    C-REACTIVE PROTEIN - Abnormal    .6 (*)    SEDIMENTATION RATE - Abnormal    Sed Rate 114 (*)    URINALYSIS, REFLEX TO URINE CULTURE - Abnormal    Specimen UA Urine, Clean Catch      Color, UA Yellow      Appearance, UA Hazy (*)     pH, UA 6.0      Specific Gravity, UA 1.015      Protein, UA 1+ (*)     Glucose, UA  Negative      Ketones, UA Negative      Bilirubin (UA) Negative      Occult Blood UA 3+ (*)     Nitrite, UA Negative      Urobilinogen, UA Negative      Leukocytes, UA Trace (*)     Narrative:     Specimen Source->Urine   URINALYSIS MICROSCOPIC - Abnormal    RBC, UA >100 (*)     WBC, UA 19 (*)     Bacteria Rare      Hyaline Casts, UA 0      Microscopic Comment SEE COMMENT      Narrative:     Specimen Source->Urine   CULTURE, URINE    Urine Culture, Routine No growth      Narrative:     Specimen Source->Urine   MAGNESIUM    Magnesium 2.2     LACTATE DEHYDROGENASE              All Lab Results:  Results for orders placed or performed during the hospital encounter of 02/24/25   CBC auto differential    Collection Time: 02/24/25  9:53 AM   Result Value Ref Range    WBC 8.45 3.90 - 12.70 K/uL    RBC 4.19 (L) 4.60 - 6.20 M/uL    Hemoglobin 11.4 (L) 14.0 - 18.0 g/dL    Hematocrit 35.9 (L) 40.0 - 54.0 %    MCV 86 82 - 98 fL    MCH 27.2 27.0 - 31.0 pg    MCHC 31.8 (L) 32.0 - 36.0 g/dL    RDW 13.5 11.5 - 14.5 %    Platelets 342 150 - 450 K/uL    MPV 8.8 (L) 9.2 - 12.9 fL    Immature Granulocytes 0.2 0.0 - 0.5 %    Gran # (ANC) 6.8 1.8 - 7.7 K/uL    Immature Grans (Abs) 0.02 0.00 - 0.04 K/uL    Lymph # 0.8 (L) 1.0 - 4.8 K/uL    Mono # 0.7 0.3 - 1.0 K/uL    Eos # 0.0 0.0 - 0.5 K/uL    Baso # 0.02 0.00 - 0.20 K/uL    nRBC 0 0 /100 WBC    Gran % 81.0 (H) 38.0 - 73.0 %    Lymph % 9.6 (L) 18.0 - 48.0 %    Mono % 8.8 4.0 - 15.0 %    Eosinophil % 0.2 0.0 - 8.0 %    Basophil % 0.2 0.0 - 1.9 %    Differential Method Automated    Comprehensive metabolic panel    Collection Time: 02/24/25  9:53 AM   Result Value Ref Range    Sodium 137 136 - 145 mmol/L    Potassium 3.6 3.5 - 5.1 mmol/L    Chloride 101 95 - 110 mmol/L    CO2 23 23 - 29 mmol/L    Glucose 117 (H) 70 - 110 mg/dL    BUN 14 8 - 23 mg/dL    Creatinine 0.9 0.5 - 1.4 mg/dL    Calcium 12.6 (HH) 8.7 - 10.5 mg/dL    Total Protein 7.3 6.0 - 8.4 g/dL    Albumin 3.1 (L) 3.5 - 5.2  g/dL    Total Bilirubin 0.6 0.1 - 1.0 mg/dL    Alkaline Phosphatase 101 40 - 150 U/L    AST 18 10 - 40 U/L    ALT 25 10 - 44 U/L    eGFR >60 >60 mL/min/1.73 m^2    Anion Gap 13 8 - 16 mmol/L   Magnesium    Collection Time: 02/24/25  9:53 AM   Result Value Ref Range    Magnesium 2.2 1.6 - 2.6 mg/dL   CPK    Collection Time: 02/24/25  9:53 AM   Result Value Ref Range    CPK 17 (L) 20 - 200 U/L   Lactate dehydrogenase    Collection Time: 02/24/25  9:53 AM   Result Value Ref Range     110 - 260 U/L   C-reactive protein    Collection Time: 02/24/25  9:53 AM   Result Value Ref Range    .6 (H) 0.0 - 8.2 mg/L   Sedimentation rate    Collection Time: 02/24/25  9:53 AM   Result Value Ref Range    Sed Rate 114 (H) 0 - 10 mm/Hr   Urine culture    Collection Time: 02/24/25 10:21 AM    Specimen: Urine   Result Value Ref Range    Urine Culture, Routine No growth    Urinalysis, Reflex to Urine Culture Urine, Clean Catch    Collection Time: 02/24/25 10:21 AM    Specimen: Urine, Clean Catch   Result Value Ref Range    Specimen UA Urine, Clean Catch     Color, UA Yellow Yellow, Straw, Flaca    Appearance, UA Hazy (A) Clear    pH, UA 6.0 5.0 - 8.0    Specific Gravity, UA 1.015 1.005 - 1.030    Protein, UA 1+ (A) Negative    Glucose, UA Negative Negative    Ketones, UA Negative Negative    Bilirubin (UA) Negative Negative    Occult Blood UA 3+ (A) Negative    Nitrite, UA Negative Negative    Urobilinogen, UA Negative <2.0 EU/dL    Leukocytes, UA Trace (A) Negative   Urinalysis Microscopic    Collection Time: 02/24/25 10:21 AM   Result Value Ref Range    RBC, UA >100 (H) 0 - 4 /hpf    WBC, UA 19 (H) 0 - 5 /hpf    Bacteria Rare None-Occ /hpf    Hyaline Casts, UA 0 0-1/lpf /lpf    Microscopic Comment SEE COMMENT        Imaging Results:  Imaging Results              CT Abdomen Pelvis With IV Contrast NO Oral Contrast (Final result)  Result time 02/24/25 11:17:41      Final result by Parker Carrero MD (02/24/25 11:17:41)                    Impression:      1.  11 cm heterogeneously enhancing mass involves the inferior right kidney.  This is associated with multiple pulmonary  nodules and masses measuring up to 6 cm.  These findings are most consistent with a renal cell carcinoma with pulmonary metastasis.  No evidence for invasion of the left renal vein at this time.    2. Negative for acute process otherwise.  Normal appendix.  Negative for hydronephrosis or renal stones.  Negative for free air.  Negative for inflammatory changes.    3.  Coronary artery calcifications.  Evidence for old granulomatous disease.  Fatty infiltration of the liver.  Hepatic and renal cysts.  Prostate calcifications.  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  Other nonemergent findings as described above.    All CT scans at this facility are performed  using dose modulation techniques as appropriate to performed exam including the following:  automated exposure control; adjustment of mA and/or kV according to the patients size (this includes techniques or standardized protocols for targeted exams where dose is matched to indication/reason for exam: i.e. extremities or head);  iterative reconstruction technique.      Electronically signed by: Parker Carrero MD  Date:    02/24/2025  Time:    11:17               Narrative:    EXAMINATION:  CT ABDOMEN PELVIS WITH IV CONTRAST, multiplanar reconstructions    CLINICAL HISTORY:  RLQ abdominal pain (Age >= 14y);    TECHNIQUE:  Axial images through the abdomen and pelvis were obtained with the use of IV contrast.  Sagittal and coronal reconstructions are provided for review.  Oral contrast was not utilized.    COMPARISON:  None    FINDINGS:  LUNG BASES: There are multiple bilateral pulmonary nodules measuring up to 2.2 cm in the left lower lobe.  There is a centrally low-density the mass in the  left lower lobe measuring 6 cm.  Lung bases are free of interstitial or alveolar abnormalities.  Negative for pleural or pericardial effusions. The distal esophagus is normal.  Calcified right hilar and mediastinal lymph nodes.  Coronary artery calcifications.    ABDOMEN: There is a heterogeneously enhancing soft tissue mass involving the inferior right kidney measuring 11 cm.  Multiple bilateral renal cysts measuring up to 4.4 cm.  5 mm right hepatic lobe cyst.  Focal fatty infiltration medial segment left hepatic lobe along the falciform ligament.  Fatty infiltration of the liver.  Calcified granulomas within the spleen.  The liver, spleen and gallbladder otherwise appear normal.  The pancreas is unremarkable.  The adrenal glands are normal.    Negative for adenopathy, ascites or inflammatory change noted within the abdomen or pelvis.  Vascular calcifications are present without aneurysmal changes. Portal vein is patent.    Normal appendix.  Appropriate stool.  Negative for dilated loops of large or small bowel.  Negative for free air.    PELVIS: The urinary bladder is unremarkable.  Prostate calcifications.  The rest of the male pelvic organs are normal. There are pelvic phleboliths.    The abdominal wall is intact.    No significant osseous abnormality is identified.  Negative for significant spinal canal stenosis. 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  There is also grade 1 retro spondylolisthesis of L4 on L5 related to degenerative facet arthropathy.  Disc height reduction at L3/L4 and L5/S1 with vacuum disc phenomenon at L5/S1 noted.  Multiple bone islands are seen throughout the pelvis.  Convex left curvature of the lumbar spine.    Negative for groin adenopathy.                                       X-Ray Hip 2 or 3 views Right with Pelvis when performed (Final result)  Result time 02/24/25 10:07:29      Final result by Parker Carrero MD (02/24/25  10:07:29)                   Impression:      1.  Negative for acute process.    2.  Stable findings as noted above.      Electronically signed by: Parker Carrero MD  Date:    02/24/2025  Time:    10:07               Narrative:    EXAMINATION:  XR HIP WITH PELVIS WHEN PERFORMED 2 OR 3 VIEWS RIGHT    CLINICAL HISTORY:  Pain in unspecified hip    TECHNIQUE:  AP view of the pelvis and frog leg lateral view of the right hip were performed.    COMPARISON:  October 24, 2012    FINDINGS:  The hip joints are well maintained.  Similar degree of acetabular spurring and small ring osteophytes surrounding the femoral head.  Age-appropriate degenerative changes of the lower lumbar spine and pelvis with enthesopathic changes present.    Negative for fracture or dislocation.    Normal bowel gas pattern.  Vascular calcifications.  Stable metallic foreign body in the medial proximal right thigh soft tissues.  Clothing artifact is present.                                              The Emergency Provider reviewed the vital signs and test results, which are outlined above.     ED Discussion     12:33 PM: Discussed pt's case with Rene Guajardo MD (Hematology and Oncology) who states pt will need a lung biopsy but that it can be done outpatient.  He is requesting that I place an ambulatory referral to IR to do this  States that the entire workup can be done as outpatient.    1:10 PM: Reassessed pt at this time. Discussed with patient and/or family/caretaker all pertinent ED information and results. Discussed pt dx and plan of tx. Gave the patient all f/u and return to the ED instructions. All questions and concerns were addressed at this time. Patient and/or family/caretaker expresses understanding of information and instructions, and is comfortable with plan to discharge. Pt is stable for discharge.     I discussed with patient and/or family/caretaker that evaluation in the ED does not suggest any emergent or life threatening medical  conditions requiring immediate intervention beyond what was provided in the ED, and I believe patient is safe for discharge.  Regardless, an unremarkable evaluation in the ED does not preclude the development or presence of a serious of life threatening condition. As such, I instructed that the patient is to return immediately for any worsening or change in current symptoms.        ED Course as of 02/27/25 0638   Mon Feb 24, 2025   1243 Urinalysis, Reflex to Urine Culture Urine, Clean Catch(!)  UTI [CD]   1244 Comprehensive metabolic panel(!!)  Hypercalcemia with other nonspecific findings [CD]   1244 CBC auto differential(!)  Nonspecific findings [CD]   1244 Lactate dehydrogenase  The normal limit [CD]   1244 Magnesium  Within normal limit [CD]   1244 CPK(!)  Within normal limit [CD]   1244 C-reactive protein(!)  Elevated [CD]   1244 Sedimentation rate(!)  Elevated [CD]   1244 Urinalysis Microscopic(!)  UTI [CD]   1302 CT Abdomen Pelvis With IV Contrast NO Oral Contrast  11 cm heterogeneously enhancing mass involves the inferior right kidney.  This is associated with multiple pulmonary  nodules and masses measuring up to 6 cm.  These findings are most consistent with a renal cell carcinoma with pulmonary metastasis.  No evidence for invasion of the left renal vein at this time.     2. Negative for acute process otherwise.  Normal appendix.  Negative for hydronephrosis or renal stones.  Negative for free air.  Negative for inflammatory changes.     3.  Coronary artery calcifications.  Evidence for old granulomatous disease.  Fatty infiltration of the liver.  Hepatic and renal cysts.  Prostate calcifications.  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  Other nonemergent findings as described above.      [CD]   1302 X-Ray Hip 2 or 3 views Right with Pelvis when performed  No acute process  [CD]      ED Course User Index  [CD] Darion Parker, DO     Medical Decision Making  Suspected renal cell carcinoma with possible metastasis.  Oncology states workup can be completed outpatient.  Ambulatory referrals to both IR and  Oncology have been placed.  Patient able to ambulate around the department at the time of discharge.  He is also tolerating fluids.  Instructed to return immediately for any new or worsening symptoms and he verbalized understanding.    Amount and/or Complexity of Data Reviewed  Labs: ordered. Decision-making details documented in ED Course.  Radiology: ordered. Decision-making details documented in ED Course.    Risk  Prescription drug management.  Risk Details: Differential diagnosis includes but is not limited to:  Renal cell carcinoma, myositis, musculoskeletal pain, electrolyte abnormality, rhabdomyolysis, UTI                ED Medication(s):  Medications   iohexoL (OMNIPAQUE 350) injection 100 mL (100 mLs Intravenous Given 2/24/25 1052)       Discharge Medication List as of 2/24/2025  1:24 PM        START taking these medications    Details   cephALEXin (KEFLEX) 500 MG capsule Take 1 capsule (500 mg total) by mouth 4 (four) times daily. for 7 days, Starting Mon 2/24/2025, Until Mon 3/3/2025, Print              Follow-up Information       Schedule an appointment as soon as possible for a visit  with [facility] HEMATOLOGY/ONCOLOGY.    Specialty: Hematology and Oncology  Contact information:  [address] [place] [id number] [phone]             Schedule an appointment as soon as possible for a visit  with [facility] ([facility]).    Specialty: Radiology  Contact information:  [address] [place] [address] [phone]  Additional information:  Please take [address] for the Hospital. Check in at Registration on the first floor across from the Surgery waiting area.             Schedule an appointment as soon as  possible for a visit  with Susan Chávez MD.    Specialty: Family Medicine  Contact information:  [address] [place] [address] DR Julianna RODRIGUEZ [id number] [phone]                                 Scribe Attestation:   Scribe #1: I performed the above scribed service and the documentation accurately describes the services I performed. I attest to the accuracy of the note.     Attending:   Physician Attestation Statement for Scribe #1: I, Darion Parker DO., personally performed the services described in this documentation, as scribed by Teddy Yoder, in my presence, and it is both accurate and complete.           Clinical Impression       ICD-10-CM ICD-9-CM   1. Renal mass  N28.89 593.9   2. Hip pain  M25.559 719.45   3. Pulmonary nodules  R91.8 793.19   4. Urinary tract infection with hematuria, site unspecified  N39.0 599.0    R31.9 599.70       Disposition:   Disposition: Discharged  Condition: Stable       Darion Parker DO  02/27/25 0646

## 2025-02-24 NOTE — DISCHARGE INSTRUCTIONS
1.)  Follow-up with primary care provider for further workup of polyneuropathy.  2.)  Call both the Interventional Radiology Department and schedule an appointment for a lung biopsy.  3.)  Call the oncology department to schedule an appointment to see a genitourinary oncologist.

## 2025-02-25 ENCOUNTER — TELEPHONE (OUTPATIENT)
Dept: HEMATOLOGY/ONCOLOGY | Facility: CLINIC | Age: 75
End: 2025-02-25
Payer: MEDICARE

## 2025-02-25 ENCOUNTER — DOCUMENTATION ONLY (OUTPATIENT)
Dept: HEMATOLOGY/ONCOLOGY | Facility: CLINIC | Age: 75
End: 2025-02-25
Payer: MEDICARE

## 2025-02-25 DIAGNOSIS — C65.9: Primary | ICD-10-CM

## 2025-02-25 LAB — BACTERIA UR CULT: NO GROWTH

## 2025-02-26 ENCOUNTER — TELEPHONE (OUTPATIENT)
Dept: HEMATOLOGY/ONCOLOGY | Facility: CLINIC | Age: 75
End: 2025-02-26
Payer: MEDICARE

## 2025-02-26 ENCOUNTER — LAB VISIT (OUTPATIENT)
Dept: LAB | Facility: HOSPITAL | Age: 75
End: 2025-02-26
Attending: EMERGENCY MEDICINE
Payer: MEDICARE

## 2025-02-26 ENCOUNTER — OFFICE VISIT (OUTPATIENT)
Dept: INTERNAL MEDICINE | Facility: CLINIC | Age: 75
End: 2025-02-26
Payer: MEDICARE

## 2025-02-26 VITALS
WEIGHT: 153.69 LBS | HEIGHT: 66 IN | DIASTOLIC BLOOD PRESSURE: 58 MMHG | SYSTOLIC BLOOD PRESSURE: 142 MMHG | OXYGEN SATURATION: 96 % | HEART RATE: 72 BPM | BODY MASS INDEX: 24.7 KG/M2

## 2025-02-26 DIAGNOSIS — R63.4 WEIGHT LOSS: ICD-10-CM

## 2025-02-26 DIAGNOSIS — R53.1 WEAKNESS: Primary | ICD-10-CM

## 2025-02-26 DIAGNOSIS — R91.8 LUNG NODULES: ICD-10-CM

## 2025-02-26 DIAGNOSIS — K59.00 CONSTIPATION, UNSPECIFIED CONSTIPATION TYPE: ICD-10-CM

## 2025-02-26 DIAGNOSIS — N30.90 CYSTITIS: ICD-10-CM

## 2025-02-26 DIAGNOSIS — N28.89 RIGHT KIDNEY MASS: ICD-10-CM

## 2025-02-26 DIAGNOSIS — C65.9 MALIGNANT NEOPLASM OF RENAL PELVIS, UNSPECIFIED LATERALITY: Primary | ICD-10-CM

## 2025-02-26 DIAGNOSIS — C65.9: ICD-10-CM

## 2025-02-26 LAB
INR PPP: 1 (ref 0.8–1.2)
PROTHROMBIN TIME: 11.8 SEC (ref 9–12.5)

## 2025-02-26 PROCEDURE — 99999 PR PBB SHADOW E&M-EST. PATIENT-LVL III: CPT | Mod: PBBFAC,,, | Performed by: PHYSICIAN ASSISTANT

## 2025-02-26 PROCEDURE — 99214 OFFICE O/P EST MOD 30 MIN: CPT | Mod: S$PBB,,, | Performed by: PHYSICIAN ASSISTANT

## 2025-02-26 PROCEDURE — 99213 OFFICE O/P EST LOW 20 MIN: CPT | Mod: PBBFAC | Performed by: PHYSICIAN ASSISTANT

## 2025-02-26 PROCEDURE — G2211 COMPLEX E/M VISIT ADD ON: HCPCS | Mod: S$PBB,,, | Performed by: PHYSICIAN ASSISTANT

## 2025-02-26 PROCEDURE — 85610 PROTHROMBIN TIME: CPT | Performed by: EMERGENCY MEDICINE

## 2025-02-26 PROCEDURE — 36415 COLL VENOUS BLD VENIPUNCTURE: CPT | Performed by: EMERGENCY MEDICINE

## 2025-02-26 NOTE — LETTER
I certify that (Name) Neymar Victoria meets the requirements as outlined in #  (shown on reverse side) and qualifies for a mobility impaired license plate/hang-tag. I further understand that willful and false certification shall subject me to fines/imprisonment as outlined in R.S. 47:463.4 (G) (4). The applicant's information is as follows:    YOB: 1950            Race:White        Gender:Male    Address:  [address] [place] [address]    City:[place]                               State:[place]     Zip Code:[address]     []Permanently Impaired - Applicant has a total or lifelong condition of mobility impairment from which little or no improvement or recovery can reasonably be expected. A medical examiners certification is required on initial application only.      [x] Temporarily Impaired - Applicant has a temporary condition of mobility impairment of which improvement or recovery can reasonably be expected. Applicant is entitled to a hangtag, which will be valid for one (1) year. A medical examiners certification is required for the renewal of the hangtag      [] Unable to appear in person at the Office of Motor Vehicles - Applicant must bring facial photo        Medical Examiner's Signature________________________________________ Date:2/26/25_________________________    Printed Name:___________________________________________________    State License #_____________________________    Address: [address] [place] [address] Drive___                                     Phone Number: [phone]                                                                                                                                                                                                                  City: [place]__________________________________ State: [place] __Zip Code: [address] _______________    TO BE COMPLETED BY MOTOR VEHICLE ANALYST ONLY    BABITA   Lic. Plate #      Hangtag Control #   Hangtag ID #       Date Issued:    #:   Office #:

## 2025-02-26 NOTE — PROGRESS NOTES
Subjective:       Patient ID: Neymar Victoria is a 74 y.o. male.      CHIEF COMPLAINT:  - 74-year-old male patient presents for an ED follow-up after experiencing extreme extremity weakness and abdominal pain.    HPI:  - Neymar went to the ED on February 24th, 2025, due to extreme extremity weakness. He reports bilateral leg weakness that developed after performing a treadmill stress test on January 15th, 2025. During the stress test, he had to stop after 2 minutes due to leg failure. The weakness has persisted since then, with fluctuating strength. He has to be cautious when walking due to this weakness.  - He also reports right lower quadrant abdominal pain and constipation for 2 weeks. He takes 1 Dulcolax at around 4 PM daily, typically resulting in a bowel movement the next morning between 9-10 AM, though the stool is often fragmented. He may have another bowel movement after dinner. Miralax was previously ineffective for 3-4 days before causing a cleanout on the fourth day, but the constipation would return within 2 days.  - He reports a loss of appetite and significant weight loss, losing 10 lbs in approximately 7-10 days. He has not been eating well. He has pain (rated 2-3 out of 10) in his right lower quadrant when he eats. He also reports constant pain (rated 2-3 out of 10) in his hip and lower back on the right side, which subsides at night and does not bother him while sleeping.  - Referrals were put in for hematology-oncology from the ED. He is scheduled for a CT chest tomorrow and will follow up with Dr. Contreras in oncology on March 14th.  - He denies burning with urination, increased urinary frequency, severe abdominal pain, nausea, or complete inability to have bowel movements.    MEDICATIONS:  - Keflex (cephalexin), every 4 hours, oral, for suspected UTI  - Dulcolax (bisacodyl), 1 tablet at 4 PM daily, oral, for constipation  - Boost (nutritional supplement drink), daily, oral, for nutrition  -  Discontinued Miralax (polyethylene glycol) for constipation due to ineffectiveness    MEDICAL HISTORY:  - Hypertension  - Hyperlipidemia  - Arthritis    FAMILY HISTORY:  - Cousin: Had kidney tumor, bottom third of kidney removed    TEST RESULTS:  - Albumin: 3.1 (low, normal range 3.5-5.2)  - urinalysis: Showed leukocytes and protein (minor indication, not impressive)  - Urine culture: Negative  - Coagulation studies: 9:30 AM (same day as visit), normal  - Treadmill stress test: January 15, 2025, patient had to stop after 2 minutes due to leg weakness    IMAGING:  - CT Abdomen/Pelvis: February 24, 2025 (ED visit), showed:  - 11 cm heterogeneously enhancing mass involving the inferior right kidney  - Multiple pulmonary nodules and masses measuring up to 6 cm  - Findings consistent with renal cell carcinoma with pulmonary metastasis  - No evidence of invasion into left renal vein  - Coronary artery calcifications  - Fatty infiltration of the liver  - Hepatic and renal cysts (chronic)  - Two 2 cm nodules in left lower lobe  - 6 cm mass in left lower lobe         Review of Systems   Constitutional:  Positive for appetite change and unexpected weight change. Negative for chills, fatigue and fever.   HENT:  Negative for congestion, dental problem, ear pain, hearing loss, rhinorrhea and trouble swallowing.    Eyes:  Negative for pain and visual disturbance.   Respiratory:  Negative for cough and shortness of breath.    Cardiovascular:  Negative for chest pain, palpitations and leg swelling.   Gastrointestinal:  Positive for abdominal pain. Negative for abdominal distention, blood in stool, constipation, diarrhea, nausea and vomiting.   Genitourinary:  Negative for difficulty urinating.   Musculoskeletal:  Negative for arthralgias and myalgias.   Skin:  Negative for rash.   Neurological:  Positive for weakness. Negative for dizziness, numbness and headaches.   Hematological:  Negative for adenopathy. Does not bruise/bleed  easily.   Psychiatric/Behavioral:  Negative for agitation, dysphoric mood and sleep disturbance.        Objective:      Physical Exam  Vitals reviewed.   Constitutional:       General: He is not in acute distress.     Appearance: Normal appearance. He is well-developed and normal weight. He is not ill-appearing or toxic-appearing.   HENT:      Head: Normocephalic and atraumatic.   Eyes:      General: Lids are normal. No scleral icterus.     Extraocular Movements: Extraocular movements intact.      Conjunctiva/sclera: Conjunctivae normal.      Pupils: Pupils are equal, round, and reactive to light.   Cardiovascular:      Rate and Rhythm: Normal rate and regular rhythm.      Heart sounds: Normal heart sounds. No murmur heard.     No friction rub. No gallop.   Pulmonary:      Effort: Pulmonary effort is normal.      Breath sounds: Normal breath sounds. No decreased breath sounds, wheezing, rhonchi or rales.   Abdominal:      General: Bowel sounds are normal. There is no distension.      Palpations: Abdomen is soft.      Tenderness: There is no abdominal tenderness. There is no right CVA tenderness, left CVA tenderness, guarding or rebound.   Musculoskeletal:      Comments: Walks with a cane   Neurological:      General: No focal deficit present.      Mental Status: He is alert and oriented to person, place, and time. Mental status is at baseline.      Cranial Nerves: No cranial nerve deficit.      Gait: Gait normal.   Psychiatric:         Mood and Affect: Mood and affect normal.         Behavior: Behavior normal.         Thought Content: Thought content normal.         Judgment: Judgment normal.         Assessment:       1. Weakness    2. Weight loss    3. Constipation, unspecified constipation type    4. Right kidney mass    5. Lung nodules    6. Cystitis        Plan:   1. Weakness  Comments:  to  PAULETTE  Overview:  to  PAULETTE      2. Weight loss    3. Constipation, unspecified constipation type    4. Right kidney  mass    5. Lung nodules    6. Cystitis        Assessment & Plan    IMPRESSION:  - Reviewed ED visit findings: 11 cm heterogeneous mass in right kidney, multiple pulmonary nodules up to 6 cm, consistent with renal cell carcinoma with pulmonary metastasis  - Assessed bilateral leg weakness, weight loss, and abdominal pain  - Considered tumor's impact on symptoms, including potential nerve compression and intestinal pressure  - Evaluated urine culture results, noting minor indications for UTI but no significant growth  - Analyzed albumin levels, found to be low at 3.1, indicating insufficient protein intake  - Discussed potential for bowel obstruction due to tumor size and location    NUTRITIONAL SUPPORT:   Educated on importance of protein intake for healing and maintaining strength.   Discussed various ways to incorporate protein drinks into diet for improved nutrition.   Neymar to increase protein intake, aiming for 3 protein drinks daily (e.g., Boost or powder mixed with milk/water).   Increased Boost intake to 3 times daily.   Suggested alternative protein powder options for cost efficiency.    BOWEL OBSTRUCTION PREVENTION:   Explained potential signs of bowel obstruction requiring emergency care.   Neymar to monitor for signs of bowel obstruction (severe pain, nausea, inability to defecate for 4-6 days).   Return to ED if signs of bowel obstruction develop.   Neymar to maintain adequate hydration, especially when taking laxatives.    CONSTIPATION:   Continued Dulcolax, taken daily at 4 PM.    INFECTION:   Advised to complete prescribed course of Keflex (antibiotics).    MOBILITY ASSISTANCE:   Completed handicapped parking permit paperwork.    LABS:   CT chest ordered for tomorrow.    ONCOLOGY FOLLOW-UP:   Follow up with Dr. Contreras (oncology) on March 14th.               This note was generated with the assistance of ambient listening technology. Verbal consent was obtained by the patient and accompanying visitor(s) for  the recording of patient appointment to facilitate this note. I attest to having reviewed and edited the generated note for accuracy, though some syntax or spelling errors may persist. Please contact the author of this note for any clarification.        Visit today included increased complexity associated with the care of the episodic problem hypertension , which was addressed while instituting co-management of the longitudinal care of the patient due to the serious and/or complex managed problem(s) .    I have evaluated and discussed management associated with medical care services that serve as the continuing focal point for all needed health care services and/or with medical care services that are part of ongoing care related to my patient's single, serious condition or a complex condition(s).    I am providing ongoing care and I am the primary care provider for this patient, and they are being managed, monitored, and/or observed for their chronic conditions over time.     I have addressed their ongoing health maintenance requirements and needs for all health care services and reviewed co-management plans provided by specialty providers when available.    Health Maintenance Due   Topic Date Due    Shingles Vaccine (1 of 2) Never done    RSV Vaccine (Age 60+ and Pregnant patients) (1 - Risk 60-74 years 1-dose series) Never done

## 2025-02-27 ENCOUNTER — HOSPITAL ENCOUNTER (OUTPATIENT)
Dept: RADIOLOGY | Facility: HOSPITAL | Age: 75
Discharge: HOME | End: 2025-02-27
Attending: HOSPITALIST
Payer: MEDICARE

## 2025-02-27 DIAGNOSIS — C65.9 MALIGNANT NEOPLASM OF RENAL PELVIS, UNSPECIFIED LATERALITY: ICD-10-CM

## 2025-02-27 PROCEDURE — 71250 CT THORAX DX C-: CPT | Mod: 26,,, | Performed by: RADIOLOGY

## 2025-02-27 PROCEDURE — 71250 CT THORAX DX C-: CPT | Mod: TC

## 2025-02-28 DIAGNOSIS — C65.9 MALIGNANT NEOPLASM OF RENAL PELVIS, UNSPECIFIED LATERALITY: Primary | ICD-10-CM

## 2025-03-03 ENCOUNTER — TELEPHONE (OUTPATIENT)
Dept: RADIOLOGY | Facility: HOSPITAL | Age: 75
End: 2025-03-03
Payer: MEDICARE

## 2025-03-03 NOTE — TELEPHONE ENCOUNTER
Interventional Radiology  Called to schedule a kidney biopsy and LVM for pt. to return my call [phone].  
yes

## 2025-03-11 ENCOUNTER — TELEPHONE (OUTPATIENT)
Dept: RADIOLOGY | Facility: HOSPITAL | Age: 75
End: 2025-03-11
Payer: MEDICARE

## 2025-03-11 ENCOUNTER — LAB VISIT (OUTPATIENT)
Dept: LAB | Facility: HOSPITAL | Age: 75
End: 2025-03-11
Attending: FAMILY MEDICINE
Payer: MEDICARE

## 2025-03-11 DIAGNOSIS — E83.52 HYPERCALCEMIA: ICD-10-CM

## 2025-03-11 LAB — CALCIUM SERPL-MCNC: 13.4 MG/DL (ref 8.7–10.5)

## 2025-03-11 PROCEDURE — 36415 COLL VENOUS BLD VENIPUNCTURE: CPT | Performed by: FAMILY MEDICINE

## 2025-03-11 PROCEDURE — 82310 ASSAY OF CALCIUM: CPT | Performed by: FAMILY MEDICINE

## 2025-03-11 NOTE — TELEPHONE ENCOUNTER
Interventional Radiology  Called to schedule a kidney biopsy and left another VM for pt. to return my call [phone].

## 2025-03-12 ENCOUNTER — TELEPHONE (OUTPATIENT)
Dept: HEMATOLOGY/ONCOLOGY | Facility: CLINIC | Age: 75
End: 2025-03-12
Payer: MEDICARE

## 2025-03-12 ENCOUNTER — RESULTS FOLLOW-UP (OUTPATIENT)
Dept: INTERNAL MEDICINE | Facility: CLINIC | Age: 75
End: 2025-03-12

## 2025-03-12 DIAGNOSIS — N28.89 RIGHT KIDNEY MASS: Primary | ICD-10-CM

## 2025-03-12 DIAGNOSIS — R53.1 WEAKNESS: ICD-10-CM

## 2025-03-12 NOTE — TELEPHONE ENCOUNTER
Called patient to let him know that he has been scheduled for an MRI and if he would like a sooner appointment he would have to go to [place]; in which he refused.

## 2025-03-12 NOTE — TELEPHONE ENCOUNTER
Spoke with patient and informed him that his calcium levels are elevated and if he is feeling symptomatic then he should go to the ED Per Dr. Contreras.     Patient said that he understood.     ----- Message from Jason Contreras MD sent at 3/12/2025  1:04 PM CDT -----  If he is symptomatic he should go to the ED. He also needs expedited workup for his malignancy. Looks like a new widely metastatic RCC; typically we can manage those outpatient but this is clearly   rapidly progressing. Needs head imaging and biopsy ASAP  ----- Message -----  From: Susan Chávez MD  Sent: 3/12/2025  12:46 PM CDT  To: Jason Contreras MD; [facility] Staff    Please contact patient, calcium is elevated and increasing. If he is having any problems I recommend he seek medical attention. This is likely related to his malignancy. I will route it to Dr. Contreras, Hematology, to see what he recommends.  ----- Message -----  From: Glen, KROGNI Lab Interface  Sent: 3/11/2025  10:25 PM CDT  To: Susan Chávez MD

## 2025-03-13 ENCOUNTER — TELEPHONE (OUTPATIENT)
Dept: RADIOLOGY | Facility: HOSPITAL | Age: 75
End: 2025-03-13
Payer: MEDICARE

## 2025-03-13 NOTE — TELEPHONE ENCOUNTER
Interventional Radiology  Scheduled 8:30AM kidney biopsy for 3/18/25 w/patient.  Instructed pt. to arrive for 7:15AM to the hospital ([address]/ Entr[address]) off [address] in [place] and check in at Patient Registration located on the first floor.  He must have a ride and NPO after midnight the night before.  Pt. denies taking ASA or other blood thinners, NSAIDs, fish oil, or GLP-1/GIP agonists.  Pt. can take morning medications the day of the procedure with a small sip of water.  I gave patient our number [phone] and he verbalized understanding of all instructions.

## 2025-03-14 ENCOUNTER — LAB VISIT (OUTPATIENT)
Dept: LAB | Facility: HOSPITAL | Age: 75
End: 2025-03-14
Attending: HOSPITALIST
Payer: MEDICARE

## 2025-03-14 ENCOUNTER — TELEPHONE (OUTPATIENT)
Dept: PULMONOLOGY | Facility: CLINIC | Age: 75
End: 2025-03-14
Payer: MEDICARE

## 2025-03-14 ENCOUNTER — OFFICE VISIT (OUTPATIENT)
Dept: HEMATOLOGY/ONCOLOGY | Facility: CLINIC | Age: 75
End: 2025-03-14
Payer: MEDICARE

## 2025-03-14 ENCOUNTER — TELEPHONE (OUTPATIENT)
Dept: HEMATOLOGY/ONCOLOGY | Facility: CLINIC | Age: 75
End: 2025-03-14
Payer: MEDICARE

## 2025-03-14 VITALS
WEIGHT: 148.56 LBS | OXYGEN SATURATION: 94 % | SYSTOLIC BLOOD PRESSURE: 151 MMHG | BODY MASS INDEX: 23.88 KG/M2 | TEMPERATURE: 98 F | DIASTOLIC BLOOD PRESSURE: 73 MMHG | HEART RATE: 95 BPM | HEIGHT: 66 IN

## 2025-03-14 DIAGNOSIS — K59.00 OBSTIPATION: ICD-10-CM

## 2025-03-14 DIAGNOSIS — D63.0 ANEMIA IN NEOPLASTIC DISEASE: ICD-10-CM

## 2025-03-14 DIAGNOSIS — E83.52 HYPERCALCEMIA OF MALIGNANCY: ICD-10-CM

## 2025-03-14 DIAGNOSIS — G89.3 CANCER ASSOCIATED PAIN: ICD-10-CM

## 2025-03-14 DIAGNOSIS — Z79.899 LONG TERM CURRENT USE OF THERAPEUTIC DRUG: ICD-10-CM

## 2025-03-14 DIAGNOSIS — C64.9 RENAL CELL CARCINOMA, UNSPECIFIED LATERALITY: ICD-10-CM

## 2025-03-14 DIAGNOSIS — N28.89 RENAL MASS: ICD-10-CM

## 2025-03-14 DIAGNOSIS — R53.81 DEBILITY: ICD-10-CM

## 2025-03-14 DIAGNOSIS — N28.89 RENAL MASS: Primary | ICD-10-CM

## 2025-03-14 LAB
25(OH)D3+25(OH)D2 SERPL-MCNC: 32 NG/ML (ref 30–96)
ALBUMIN SERPL BCP-MCNC: 3 G/DL (ref 3.5–5.2)
ALP SERPL-CCNC: 93 U/L (ref 40–150)
ALT SERPL W/O P-5'-P-CCNC: 26 U/L (ref 10–44)
ANION GAP SERPL CALC-SCNC: 15 MMOL/L (ref 8–16)
AST SERPL-CCNC: 20 U/L (ref 10–40)
BILIRUB SERPL-MCNC: 0.6 MG/DL (ref 0.1–1)
BUN SERPL-MCNC: 12 MG/DL (ref 8–23)
CA-I BLDV-SCNC: 1.62 MMOL/L (ref 1.06–1.42)
CALCIUM SERPL-MCNC: 13.7 MG/DL (ref 8.7–10.5)
CHLORIDE SERPL-SCNC: 93 MMOL/L (ref 95–110)
CO2 SERPL-SCNC: 26 MMOL/L (ref 23–29)
CREAT SERPL-MCNC: 0.9 MG/DL (ref 0.5–1.4)
ERYTHROCYTE [DISTWIDTH] IN BLOOD BY AUTOMATED COUNT: 14.5 % (ref 11.5–14.5)
EST. GFR  (NO RACE VARIABLE): >60 ML/MIN/1.73 M^2
FERRITIN SERPL-MCNC: 2237 NG/ML (ref 20–300)
GLUCOSE SERPL-MCNC: 103 MG/DL (ref 70–110)
HCT VFR BLD AUTO: 33.6 % (ref 40–54)
HGB BLD-MCNC: 10.8 G/DL (ref 14–18)
IMM GRANULOCYTES # BLD AUTO: 0.04 K/UL (ref 0–0.04)
IRON SERPL-MCNC: 16 UG/DL (ref 45–160)
MCH RBC QN AUTO: 27.2 PG (ref 27–31)
MCHC RBC AUTO-ENTMCNC: 32.1 G/DL (ref 32–36)
MCV RBC AUTO: 85 FL (ref 82–98)
NEUTROPHILS # BLD AUTO: 6.9 K/UL (ref 1.8–7.7)
PLATELET # BLD AUTO: 382 K/UL (ref 150–450)
PMV BLD AUTO: 8.9 FL (ref 9.2–12.9)
POTASSIUM SERPL-SCNC: 3 MMOL/L (ref 3.5–5.1)
PROT SERPL-MCNC: 7.4 G/DL (ref 6–8.4)
PTH-INTACT SERPL-MCNC: 6.2 PG/ML (ref 9–77)
RBC # BLD AUTO: 3.97 M/UL (ref 4.6–6.2)
SATURATED IRON: 7 % (ref 20–50)
SODIUM SERPL-SCNC: 134 MMOL/L (ref 136–145)
TOTAL IRON BINDING CAPACITY: 229 UG/DL (ref 250–450)
TRANSFERRIN SERPL-MCNC: 155 MG/DL (ref 200–375)
TSH SERPL DL<=0.005 MIU/L-ACNC: 1.16 UIU/ML (ref 0.4–4)
WBC # BLD AUTO: 8.73 K/UL (ref 3.9–12.7)

## 2025-03-14 PROCEDURE — 82652 VIT D 1 25-DIHYDROXY: CPT | Performed by: HOSPITALIST

## 2025-03-14 PROCEDURE — 82728 ASSAY OF FERRITIN: CPT | Performed by: HOSPITALIST

## 2025-03-14 PROCEDURE — 82330 ASSAY OF CALCIUM: CPT | Performed by: HOSPITALIST

## 2025-03-14 PROCEDURE — 99999 PR PBB SHADOW E&M-EST. PATIENT-LVL V: CPT | Mod: PBBFAC,,, | Performed by: HOSPITALIST

## 2025-03-14 PROCEDURE — 85027 COMPLETE CBC AUTOMATED: CPT | Performed by: HOSPITALIST

## 2025-03-14 PROCEDURE — 80053 COMPREHEN METABOLIC PANEL: CPT | Performed by: HOSPITALIST

## 2025-03-14 PROCEDURE — 99215 OFFICE O/P EST HI 40 MIN: CPT | Mod: PBBFAC | Performed by: HOSPITALIST

## 2025-03-14 PROCEDURE — 82306 VITAMIN D 25 HYDROXY: CPT | Performed by: HOSPITALIST

## 2025-03-14 PROCEDURE — 84443 ASSAY THYROID STIM HORMONE: CPT | Performed by: HOSPITALIST

## 2025-03-14 PROCEDURE — 83970 ASSAY OF PARATHORMONE: CPT | Performed by: HOSPITALIST

## 2025-03-14 PROCEDURE — 83540 ASSAY OF IRON: CPT | Performed by: HOSPITALIST

## 2025-03-14 RX ORDER — BISACODYL 5 MG
5 TABLET, DELAYED RELEASE (ENTERIC COATED) ORAL DAILY PRN
Qty: 30 TABLET | Refills: 1 | Status: SHIPPED | OUTPATIENT
Start: 2025-03-14 | End: 2026-03-14

## 2025-03-14 RX ORDER — TRAMADOL HYDROCHLORIDE 50 MG/1
50 TABLET ORAL EVERY 6 HOURS PRN
Qty: 60 EACH | Refills: 5 | Status: SHIPPED | OUTPATIENT
Start: 2025-03-14

## 2025-03-14 RX ORDER — AMOXICILLIN 250 MG
1 CAPSULE ORAL DAILY
Qty: 60 TABLET | Refills: 0 | Status: SHIPPED | OUTPATIENT
Start: 2025-03-14

## 2025-03-14 RX ORDER — ONDANSETRON 8 MG/1
8 TABLET, ORALLY DISINTEGRATING ORAL EVERY 8 HOURS PRN
Qty: 60 TABLET | Refills: 5 | Status: SHIPPED | OUTPATIENT
Start: 2025-03-14

## 2025-03-14 RX ORDER — POLYETHYLENE GLYCOL 3350 17 G/17G
17 POWDER, FOR SOLUTION ORAL 2 TIMES DAILY
Qty: 510 G | Refills: 0 | Status: SHIPPED | OUTPATIENT
Start: 2025-03-14

## 2025-03-14 NOTE — ASSESSMENT & PLAN NOTE
Presumably in setting of hypercalcemia  - Miralax bid  - Senna 2 qhs  - Bisacodyl prn  - Management of hypercalcemia

## 2025-03-14 NOTE — Clinical Note
Can we try to expedite his head imaging? Also hoping social work can set him up with home health. Can't remember who I work with in , can you pass along to them? Thanks!

## 2025-03-14 NOTE — ASSESSMENT & PLAN NOTE
- Brain MRI and Tc99m bone scan to complete staging  - Please get brain scan ASAP  - FU next week for kidney biopsy 3/18/25  - Presuming RCC confirmed, favor upfront sarita/pem to maximize SHEFFIELD  - Anticipate starting ASAP after diagnosis confirmation  - FU with me next week to review biopsy and confirm treatment plans

## 2025-03-14 NOTE — PATIENT INSTRUCTIONS
We discussed your recent imaging showing a tumor in the right kidney along with high concern for metastatic spread to the lungs. By imaging, this looks most consistent with a metastatic renal cell carcinoma (kidney cancer). This is complicated by your progressive leg weakness, high calcium levels, and severe constipation.     We need to urgently help reduce your calcium levels along with help you move your bowels. Will arrange IV fluids and zometa infusion on Monday next week to help with calcium levels and RX for miralax, dulcolax, and sennakot called into local pharmacy. Will also give you some tramadol for pain and ondansetron for nausea.    I would also like to get brain imaging with an MRI to evaluate your weakness and a bone scan to look for any metastatic disease in the bones.     You are scheduled for a kidney biopsy on Tuesday. Once we confirm the exact type of cancer, would like to start treatment ASAP. We discussed the likelihood of a metastatic renal cell carcinoma. If confirmed, would want to start combination of lenvatinib + pembrolizumab. Orders have been placed to help expedite, but won't start until we get confirmation.    - Arrange IVF and zometa on Monday  - Kidney biopsy Tuesday  - Need Bone scan and brain MRI  - FU with me next Friday to review reults  - Tentative plan to start treatment week of 3/24/25

## 2025-03-14 NOTE — PROGRESS NOTES
MEDICAL ONCOLOGY - NEW PATIENT VISIT    Best Contact Phone Number(s): [phone] (home)      Cancer/Stage/TNM:    Cancer Staging   No matching staging information was found for the patient.       Reason for visit: Neymar Victoria is a 74 y.o. male found to have right sided kidney tumor with associated pulmonary metastases 02/2025 in the setting of progressive leg weakness and hypercalcemia. Awaiting biopsy next week. He presents to medical oncology clinic for initial evaluation.    History has been obtained by chart review and discussion with the patient.    HPI:     Has had progressive weakness over the last 2 months. Seems to have started around 1/15/25 when he had to stop a cardiac stress test early due to leg weakness. Has progressed to the point he can no longer walk effectively. Requires assistance with transfer in and out of bed. He has associated pain over his right flank over the last month. Pain is constant and can be up to 7/10. Reports normal urination. No dysuria or hematuria. Has signficant constipation; using miralax along with senna. Last bowel was probably 1 week ago. No back pain or radicular pain. Denies signficant abdominal pain or nausea. Appetite is very low. Has lost about 30 lbs over the last two months. No CP, SOB or cough. No headaches.    Prior to current episode he was generally healthy and quite active and independent. Has taken metoprolol and amlodipine-valsartan for HTN. No known MI or stroke. No known DM2. No signficant lung disease.       Oncology History Overview Note   2/24/25: ED visit with bilateral leg weakness for several weeks. Associated RLQ pain and constipation with poor appetite.     2/24/25 CT a/p  Impression:  1.  11 cm heterogeneously enhancing mass involves the inferior right kidney.  This is associated with multiple pulmonary  nodules and masses measuring up to 6 cm.  These findings are most consistent with a renal cell carcinoma with pulmonary metastasis.  No evidence  for invasion of the left renal vein at this time.  2. Negative for acute process otherwise.  Normal appendix.  Negative for hydronephrosis or renal stones.  Negative for free air.  Negative for inflammatory changes.  3.  Coronary artery calcifications.  Evidence for old granulomatous disease.  Fatty infiltration of the liver.  Hepatic and renal cysts.  Prostate calcifications.  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  Other nonemergent findings as described above.    2/27/25 CT Chest  Impression:  - Bilateral pulmonary metastatic disease with the largest lesion located within the right lower lobe measuring 4.0 x 6.2 cm.     Renal mass   2/26/2025 Initial Diagnosis    Renal cell carcinoma          Past Medical History:   Diagnosis Date    Arthritis     Early dry stage nonexudative age-related macular degeneration of both eyes 9/28/2020    Erectile dysfunction     Hyperlipidemia LDL goal < 160     Hypertension     Seasonal allergies         Past Surgical History:   Procedure Laterality Date    CATARACT EXTRACTION W/ INTRAOCULAR LENS IMPLANT Right 06/10/2021    COLONOSCOPY N/A 02/29/2016    Procedure: COLONOSCOPY;  Surgeon: Aruna Rocha MD;  Location: [facility];  Service: Endoscopy;  Laterality: N/A;    COLONOSCOPY N/A 06/07/2019    Procedure: COLONOSCOPY;  Surgeon: Jacobo Sigala MD;  Location: [facility];  Service: Endoscopy;  Laterality: N/A;    COLONOSCOPY N/A 09/09/2022    Procedure: COLONOSCOPY;  Surgeon: Jacobo Sigala MD;  Location: [facility];  Service: General;  Laterality: N/A;    WISDOM TOOTH EXTRACTION          Family History   Problem Relation Name Age of Onset    Macular degeneration Mother Johan Victoria     Arthritis Mother Johan Victoria     Lymphoma Mother Johan Victoria     Vision loss Mother Johan Victoria     Melanoma Father Nilesh GODINEZYari Victoria     Cancer  "Father Nilesh Victoria     Stroke Paternal Grandfather Neymar Victoria     Hypertension Other      Psoriasis Neg Hx      Lupus Neg Hx      Colon cancer Neg Hx      Uterine cancer Neg Hx      Prostate cancer Neg Hx      Bladder Cancer Neg Hx      Kidney cancer Neg Hx          Social History     Tobacco Use    Smoking status: Former     Current packs/day: 0.00     Average packs/day: 0.5 packs/day for 60.0 years (30.0 ttl pk-yrs)     Types: Cigarettes     Start date:      Quit date: 2025     Years since quittin.1    Smokeless tobacco: Never   Substance Use Topics    Alcohol use: Not Currently     Comment: social use of alchol        I have reviewed and updated the patient's past medical, surgical, family and social histories.    Review of patient's allergies indicates:   Allergen Reactions    Statins-hmg-coa reductase inhibitors      Myalgia        Current Medications[1]     Physical Exam:   BP (!) 151/73   Pulse 95   Temp 98.1 °F (36.7 °C) (Temporal)   Ht 5' 6" (1.676 m)   Wt 67.4 kg (148 lb 9.4 oz)   SpO2 (!) 94%   BMI 23.98 kg/m²      ECOG Performance status: 3            Physical Exam  Constitutional:       General: He is not in acute distress.     Appearance: Normal appearance. He is ill-appearing.   HENT:      Head: Normocephalic.   Eyes:      General: No scleral icterus.     Extraocular Movements: Extraocular movements intact.      Conjunctiva/sclera: Conjunctivae normal.   Cardiovascular:      Rate and Rhythm: Normal rate.      Heart sounds: No murmur heard.  Pulmonary:      Effort: Pulmonary effort is normal. No respiratory distress.   Abdominal:      General: There is no distension.      Palpations: Abdomen is soft.   Skin:     General: Skin is warm and dry.   Neurological:      Mental Status: He is alert and oriented to person, place, and time.      Motor: Weakness present.   Psychiatric:         Mood and Affect: Mood normal.         Behavior: Behavior normal.         Thought Content: Thought " content normal.           Labs:   Recent Results (from the past 48 hours)   CBC Oncology    Collection Time: 03/14/25  2:19 PM   Result Value Ref Range    WBC 8.73 3.90 - 12.70 K/uL    RBC 3.97 (L) 4.60 - 6.20 M/uL    Hemoglobin 10.8 (L) 14.0 - 18.0 g/dL    Hematocrit 33.6 (L) 40.0 - 54.0 %    MCV 85 82 - 98 fL    MCH 27.2 27.0 - 31.0 pg    MCHC 32.1 32.0 - 36.0 g/dL    RDW 14.5 11.5 - 14.5 %    Platelets 382 150 - 450 K/uL    MPV 8.9 (L) 9.2 - 12.9 fL    Gran # (ANC) 6.9 1.8 - 7.7 K/uL    Immature Grans (Abs) 0.04 0.00 - 0.04 K/uL   Comprehensive Metabolic Panel    Collection Time: 03/14/25  2:19 PM   Result Value Ref Range    Sodium 134 (L) 136 - 145 mmol/L    Potassium 3.0 (L) 3.5 - 5.1 mmol/L    Chloride 93 (L) 95 - 110 mmol/L    CO2 26 23 - 29 mmol/L    Glucose 103 70 - 110 mg/dL    BUN 12 8 - 23 mg/dL    Creatinine 0.9 0.5 - 1.4 mg/dL    Calcium 13.7 (HH) 8.7 - 10.5 mg/dL    Total Protein 7.4 6.0 - 8.4 g/dL    Albumin 3.0 (L) 3.5 - 5.2 g/dL    Total Bilirubin 0.6 0.1 - 1.0 mg/dL    Alkaline Phosphatase 93 40 - 150 U/L    AST 20 10 - 40 U/L    ALT 26 10 - 44 U/L    eGFR >60 >60 mL/min/1.73 m^2    Anion Gap 15 8 - 16 mmol/L   TSH    Collection Time: 03/14/25  2:19 PM   Result Value Ref Range    TSH 1.160 0.400 - 4.000 uIU/mL   Ferritin    Collection Time: 03/14/25  2:19 PM   Result Value Ref Range    Ferritin 2,237 (H) 20.0 - 300.0 ng/mL          Imaging:       CT Chest Without Contrast  Narrative: EXAM: CT CHEST WITHOUT CONTRAST    CLINICAL HISTORY: Kidney cancer, monitor; [C65.9]-Malignant neoplasm of unspecified renal pelvis.    TECHNIQUE: Standard thin section axial images of the chest with 2-D reformatted sagittal and coronal images.Exam performed without IV contrast.    All CT scans at this location are performed using dose modulation techniques as appropriate to a performed exam including the following: automated exposure control; adjustment of the mA and/or kV according to patient size (this includes  techniques or standardized protocols for targeted exams where dose is matched to indication / reason for exam; i.e. extremities or head); use of iterative reconstruction technique.    COMPARISON: No prior CT chest.  Study correlated with recent CT abdomen and pelvis dated 02/24/2025.    FINDINGS:    Multiple metastatic lesions identified throughout the chest.  Largest is located within the posterior right lower lobe measuring 4.0 x 6.2 cm.  Multiple additional metastatic lesions throughout the right lung.  Additional posterior right lower lobe lesion measures 1.6 x 1.9 cm.    Metastatic lesions noted within the left lower lobe.  The largest is a pleural-based lesion measuring 2.0 x 1.4 cm (series 604 image 226).  2.  Subcentimeter pulmonary nodules within the left upper lobe and lingula the larger of the 2 is located within the lingula and is pleural-based, measuring 3 mm.    Heart normal in size and appearance. Moderate coronary artery calcifications. Thoracic aorta is normal in caliber. Partially calcified subcarinal lymph node measures 1.3 x 0.9 cm.  Additional smaller scattered lymph nodes identified precarinal lymph node measuring 0.4 x 1.5 cm.  Multiple calcified right hilar lymph nodes.  No axillary adenopathy.  No adenopathy within the inferior neck.    Soft tissues of the inferior neck are normal.  Trachea and esophagus are midline and are normal.    Chest wall soft tissues are normal. No acute bony abnormality.  Impression: Bilateral pulmonary metastatic disease with the largest lesion located within the right lower lobe measuring 4.0 x 6.2 cm.    Finalized on: 2/27/2025 1:10 PM By:  Bayron De Guzman MD  [facility]# [id number]      2025-02-27 13:13:[id number]     [facility]        I have personally reviewed the above imaging    Path:   Reviewed pathology as documented in oncology history      Assessment and Plan:   1. Renal mass  Overview:  By imaging most consistent with a right sided RCC with associated lung  metastases. Complicated by leg weakness and hypercalcemia.    Assessment & Plan:  - Brain MRI and Tc99m bone scan to complete staging  - Please get brain scan ASAP  - FU next week for kidney biopsy 3/18/25  - Presuming RCC confirmed, favor upfront sarita/pem to maximize SHEFFIELD  - Anticipate starting ASAP after diagnosis confirmation  - FU with me next week to review biopsy and confirm treatment plans    Orders:  -     Ambulatory referral/consult to Oncology  -     CBC Oncology; Standing  -     Comprehensive Metabolic Panel; Standing  -     TSH; Future; Expected date: 03/14/2025  -     Ferritin; Future; Expected date: 03/14/2025  -     Iron and TIBC; Future; Expected date: 03/14/2025  -     Physician communication order; Standing  -     Physician communication order; Standing  -     Physician communication order; Standing  -     Physician communication order; Standing  -     ondansetron (ZOFRAN-ODT) 8 MG TbDL; Take 1 tablet (8 mg total) by mouth every 8 (eight) hours as needed (nausea). Take 1 tablet (8 mg) by mouth 30 minutes prior to oral chemotherapy and every 8 hours as needed for nausea/vomiting (MAX = 3 tablets in 24 hours).  Dispense: 60 tablet; Refill: 5  -     lenvatinib (LENVIMA) 20 mg/day (10 mg x 2) Cap; Take 20 mg by mouth once daily.  Dispense: 60 capsule; Refill: 11  -     HOME HEALTH ORDERS  -     Ambulatory referral/consult to Home Health; Future; Expected date: 03/15/2025    2. Hypercalcemia of malignancy  Assessment & Plan:  - Needs IVF and zometa appts on Monday  - Send CMP, PTH, PTHrp, calcitriol, vitamin d now    Orders:  -     PTH, intact; Future; Expected date: 03/14/2025  -     PTH-RELATED PEPTIDE; Future; Expected date: 03/14/2025  -     Calcitriol; Future; Expected date: 03/14/2025  -     Vitamin D; Future; Expected date: 03/14/2025  -     CALCIUM, IONIZED; Future; Expected date: 03/14/2025    3. Obstipation  Assessment & Plan:  Presumably in setting of hypercalcemia  - Miralax bid  - Senna 2  qhs  - Bisacodyl prn  - Management of hypercalcemia    Orders:  -     polyethylene glycol (MIRALAX) 17 gram/dose powder; Take 17 g by mouth 2 (two) times daily.  Dispense: 510 g; Refill: 0  -     senna-docusate 8.6-50 mg (SENNA WITH DOCUSATE SODIUM) 8.6-50 mg per tablet; Take 1 tablet by mouth once daily.  Dispense: 60 tablet; Refill: 0  -     bisacodyL (DULCOLAX, BISACODYL,) 5 mg EC tablet; Take 1 tablet (5 mg total) by mouth daily as needed for Constipation.  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 Cancer associated pain  -     traMADoL (ULTRAM) 50 mg tablet; Take 1 tablet (50 mg total) by mouth every 6 (six) hours as needed for Pain.  Dispense: 60 each; Refill: 5    6. Debility  -     HOME HEALTH ORDERS    7. Renal cell carcinoma, unspecified laterality  Overview:  By imaging most consistent with a right sided RCC with associated lung metastases. Complicated by leg weakness and hypercalcemia.    Assessment & Plan:  - Brain MRI and Tc99m bone scan to complete staging  - Please get brain scan ASAP  - FU next week for kidney biopsy 3/18/25  - Presuming RCC confirmed, favor upfront sarita/pem to maximize SHEFFIELD  - Anticipate starting ASAP after diagnosis confirmation  - FU with me next week to review biopsy and confirm treatment plans      8. Long term current use of therapeutic drug  -     TSH; Future; Expected date: 03/14/2025               Follow up:   Route Chart for Scheduling    Med Onc Chart Routing      Follow up with physician 1 week.   Follow up with AZUL    Infusion scheduling note New or changed treatment   IVF + zometa 3/17/25 ;Pembro 3/24/25   Injection scheduling note    Labs CBC and CMP   Scheduling:  Preferred lab:  Lab interval:     Imaging MRI and bone scans   please expedite MR brain ASAP   Pharmacy appointment    Other referrals                   Treatment Plan Information   OP LENVATINIB 20 MG DAILY + PEMBROLIZUMAB 400 MG Q6W Jason Contreras MD   Associated diagnosis: Renal mass   noted on 2/26/2025   Line of treatment: First Line  Treatment Goal: Palliative     Upcoming Treatment Dates - OP LENVATINIB 20 MG DAILY + PEMBROLIZUMAB 400 MG Q6W    3/24/2025       Chemotherapy       pembrolizumab (KEYTRUDA) 400 mg in 0.9% NaCl SolP 116 mL infusion  5/5/2025       Chemotherapy       pembrolizumab (KEYTRUDA) 400 mg in 0.9% NaCl SolP 116 mL infusion  6/16/2025       Chemotherapy       pembrolizumab (KEYTRUDA) 400 mg in 0.9% NaCl SolP 116 mL infusion  7/28/2025       Chemotherapy       pembrolizumab (KEYTRUDA) 400 mg in 0.9% NaCl SolP 116 mL infusion    Supportive Plan Information  IV FLUIDS AND ELECTROLYTES Jason Contreras MD   Associated Diagnosis: Hypercalcemia of malignancy   noted on 3/14/2025   Line of treatment: Supportive Care   Treatment goal: Supportive     Upcoming Treatment Dates - IV FLUIDS AND ELECTROLYTES    No upcoming days in selected categories.    Therapy Plan Information  ZOLEDRONIC ACID (ZOMETA) Q4W for Hypercalcemia, noted on 3/14/2025  Medications  zoledronic acid (ZOMETA) 4 mg in 0.9% NaCl 100 mL IVPB  4 mg, Intravenous, Every 4 weeks  PRN Medications  EPINEPHrine (EPIPEN) 0.3 mg/0.3 mL pen injection 0.3 mg  0.3 mg, Intramuscular  diphenhydrAMINE injection 50 mg  50 mg, Intravenous  hydrocortisone sodium succinate injection 100 mg  100 mg, Intravenous  Flushes  0.9% NaCl 250 mL flush bag  Intravenous, Every 4 weeks  sodium chloride 0.9% flush 10 mL  10 mL, Intravenous, Every 4 weeks  heparin, porcine (PF) 100 unit/mL injection flush 500 Units  500 Units, Intravenous, Every 4 weeks  alteplase injection 2 mg  2 mg, Intra-Catheter, Every 4 weeks      No therapy plan of the specified type found.    No therapy plan of the specified type found.      The above information has been reviewed with the patient and all questions have been  answered to their apparent satisfaction.  They understand that they can call the clinic with any questions.    Jason Contreras MD  Hematology/Oncology  [facility] [[email or web address]] - [facility]         [1]   Current Outpatient Medications   Medication Sig Dispense Refill    amlodipine-valsartan (EXFORGE)  mg per tablet Take 1 tablet by mouth every evening. 90 tablet 1    cyanocobalamin (VITAMIN B-12) 1000 MCG tablet Take 1 tablet (1,000 mcg total) by mouth once daily.      lactulose (CHRONULAC) 20 gram/30 mL Soln Take 30 mLs (20 g total) by mouth once daily. 420 mL 2    metoprolol succinate (TOPROL-XL) 200 MG 24 hr tablet Take 1 tablet (200 mg total) by mouth every evening. 90 tablet 3    PROPYLENE GLYCOL/ (SYSTANE OPHT) Apply to eye.      tadalafiL (CIALIS) 10 MG tablet 1 tablet at least 30 minutes prior to sexual activity; not more than once daily 10 tablet 1    bisacodyL (DULCOLAX, BISACODYL,) 5 mg EC tablet Take 1 tablet (5 mg total) by mouth daily as needed for Constipation. 30 tablet 1    lenvatinib (LENVIMA) 20 mg/day (10 mg x 2) Cap Take 20 mg by mouth once daily. 60 capsule 11    ondansetron (ZOFRAN-ODT) 8 MG TbDL Take 1 tablet (8 mg total) by mouth every 8 (eight) hours as needed (nausea). Take 1 tablet (8 mg) by mouth 30 minutes prior to oral chemotherapy and every 8 hours as needed for nausea/vomiting (MAX = 3 tablets in 24 hours). 60 tablet 5    polyethylene glycol (MIRALAX) 17 gram/dose powder Take 17 g by mouth 2 (two) times daily. 510 g 0    senna-docusate 8.6-50 mg (SENNA WITH DOCUSATE SODIUM) 8.6-50 mg per tablet Take 1 tablet by mouth once daily. 60 tablet 0    traMADoL (ULTRAM) 50 mg tablet Take 1 tablet (50 mg total) by mouth every 6 (six) hours as needed for Pain. 60 each 5     No current facility-administered medications for this visit.

## 2025-03-17 ENCOUNTER — HOSPITAL ENCOUNTER (INPATIENT)
Facility: HOSPITAL | Age: 75
LOS: 2 days | Discharge: HOSPICE | DRG: 687 | End: 2025-03-20
Attending: EMERGENCY MEDICINE | Admitting: FAMILY MEDICINE
Payer: MEDICARE

## 2025-03-17 DIAGNOSIS — R91.8 PULMONARY NODULES: ICD-10-CM

## 2025-03-17 DIAGNOSIS — R31.9 HEMATURIA, UNSPECIFIED TYPE: ICD-10-CM

## 2025-03-17 DIAGNOSIS — N28.89 RENAL MASS: ICD-10-CM

## 2025-03-17 DIAGNOSIS — E83.52 HYPERCALCEMIA: Primary | ICD-10-CM

## 2025-03-17 DIAGNOSIS — R07.9 CHEST PAIN: ICD-10-CM

## 2025-03-17 DIAGNOSIS — E83.52 HYPERCALCEMIA OF MALIGNANCY: ICD-10-CM

## 2025-03-17 DIAGNOSIS — R53.1 WEAKNESS: ICD-10-CM

## 2025-03-17 LAB
ALBUMIN SERPL BCP-MCNC: 2.8 G/DL (ref 3.5–5.2)
ALP SERPL-CCNC: 88 U/L (ref 40–150)
ALT SERPL W/O P-5'-P-CCNC: 18 U/L (ref 10–44)
ANION GAP SERPL CALC-SCNC: 12 MMOL/L (ref 8–16)
AST SERPL-CCNC: 17 U/L (ref 10–40)
BACTERIA #/AREA URNS HPF: ABNORMAL /HPF
BASOPHILS # BLD AUTO: 0.02 K/UL (ref 0–0.2)
BASOPHILS NFR BLD: 0.3 % (ref 0–1.9)
BILIRUB SERPL-MCNC: 0.6 MG/DL (ref 0.1–1)
BILIRUB UR QL STRIP: NEGATIVE
BNP SERPL-MCNC: 12 PG/ML (ref 0–99)
BUN SERPL-MCNC: 15 MG/DL (ref 8–23)
CALCIUM SERPL-MCNC: 14.8 MG/DL (ref 8.7–10.5)
CHLORIDE SERPL-SCNC: 95 MMOL/L (ref 95–110)
CK SERPL-CCNC: 46 U/L (ref 20–200)
CLARITY UR: ABNORMAL
CO2 SERPL-SCNC: 28 MMOL/L (ref 23–29)
COLOR UR: ABNORMAL
CREAT SERPL-MCNC: 0.9 MG/DL (ref 0.5–1.4)
DIFFERENTIAL METHOD BLD: ABNORMAL
EOSINOPHIL # BLD AUTO: 0.1 K/UL (ref 0–0.5)
EOSINOPHIL NFR BLD: 0.7 % (ref 0–8)
ERYTHROCYTE [DISTWIDTH] IN BLOOD BY AUTOMATED COUNT: 14.5 % (ref 11.5–14.5)
EST. GFR  (NO RACE VARIABLE): >60 ML/MIN/1.73 M^2
GLUCOSE SERPL-MCNC: 97 MG/DL (ref 70–110)
GLUCOSE UR QL STRIP: NEGATIVE
HCT VFR BLD AUTO: 33.8 % (ref 40–54)
HCV AB SERPL QL IA: NEGATIVE
HEP C VIRUS HOLD SPECIMEN: NORMAL
HGB BLD-MCNC: 10.8 G/DL (ref 14–18)
HGB UR QL STRIP: ABNORMAL
HIV 1+2 AB+HIV1 P24 AG SERPL QL IA: NEGATIVE
HYALINE CASTS #/AREA URNS LPF: 0 /LPF
IMM GRANULOCYTES # BLD AUTO: 0.03 K/UL (ref 0–0.04)
IMM GRANULOCYTES NFR BLD AUTO: 0.4 % (ref 0–0.5)
KETONES UR QL STRIP: NEGATIVE
LEUKOCYTE ESTERASE UR QL STRIP: ABNORMAL
LYMPHOCYTES # BLD AUTO: 0.6 K/UL (ref 1–4.8)
LYMPHOCYTES NFR BLD: 8.6 % (ref 18–48)
MAGNESIUM SERPL-MCNC: 2 MG/DL (ref 1.6–2.6)
MCH RBC QN AUTO: 26.9 PG (ref 27–31)
MCHC RBC AUTO-ENTMCNC: 32 G/DL (ref 32–36)
MCV RBC AUTO: 84 FL (ref 82–98)
MICROSCOPIC COMMENT: ABNORMAL
MONOCYTES # BLD AUTO: 0.7 K/UL (ref 0.3–1)
MONOCYTES NFR BLD: 9.2 % (ref 4–15)
NEUTROPHILS # BLD AUTO: 6.1 K/UL (ref 1.8–7.7)
NEUTROPHILS NFR BLD: 80.8 % (ref 38–73)
NITRITE UR QL STRIP: NEGATIVE
NRBC BLD-RTO: 0 /100 WBC
OHS QRS DURATION: 102 MS
OHS QTC CALCULATION: 440 MS
PH UR STRIP: 7 [PH] (ref 5–8)
PLATELET # BLD AUTO: 341 K/UL (ref 150–450)
PMV BLD AUTO: 8.6 FL (ref 9.2–12.9)
POTASSIUM SERPL-SCNC: 3.1 MMOL/L (ref 3.5–5.1)
PROT SERPL-MCNC: 7.1 G/DL (ref 6–8.4)
PROT UR QL STRIP: ABNORMAL
PTH-INTACT SERPL-MCNC: 5.1 PG/ML (ref 9–77)
RBC # BLD AUTO: 4.02 M/UL (ref 4.6–6.2)
RBC #/AREA URNS HPF: >100 /HPF (ref 0–4)
SODIUM SERPL-SCNC: 135 MMOL/L (ref 136–145)
SP GR UR STRIP: 1.01 (ref 1–1.03)
TROPONIN I SERPL DL<=0.01 NG/ML-MCNC: 0.02 NG/ML (ref 0–0.03)
URN SPEC COLLECT METH UR: ABNORMAL
UROBILINOGEN UR STRIP-ACNC: NEGATIVE EU/DL
WBC # BLD AUTO: 7.48 K/UL (ref 3.9–12.7)
WBC #/AREA URNS HPF: 3 /HPF (ref 0–5)

## 2025-03-17 PROCEDURE — 96361 HYDRATE IV INFUSION ADD-ON: CPT

## 2025-03-17 PROCEDURE — 82550 ASSAY OF CK (CPK): CPT | Performed by: EMERGENCY MEDICINE

## 2025-03-17 PROCEDURE — 83880 ASSAY OF NATRIURETIC PEPTIDE: CPT | Performed by: EMERGENCY MEDICINE

## 2025-03-17 PROCEDURE — 96374 THER/PROPH/DIAG INJ IV PUSH: CPT

## 2025-03-17 PROCEDURE — G0378 HOSPITAL OBSERVATION PER HR: HCPCS

## 2025-03-17 PROCEDURE — 83970 ASSAY OF PARATHORMONE: CPT | Performed by: EMERGENCY MEDICINE

## 2025-03-17 PROCEDURE — 25000003 PHARM REV CODE 250: Performed by: HOSPITALIST

## 2025-03-17 PROCEDURE — 99285 EMERGENCY DEPT VISIT HI MDM: CPT | Mod: 25

## 2025-03-17 PROCEDURE — 96372 THER/PROPH/DIAG INJ SC/IM: CPT | Mod: 59 | Performed by: INTERNAL MEDICINE

## 2025-03-17 PROCEDURE — 63600175 PHARM REV CODE 636 W HCPCS: Mod: TB | Performed by: INTERNAL MEDICINE

## 2025-03-17 PROCEDURE — 99215 OFFICE O/P EST HI 40 MIN: CPT | Mod: 95,,, | Performed by: INTERNAL MEDICINE

## 2025-03-17 PROCEDURE — 93010 ELECTROCARDIOGRAM REPORT: CPT | Mod: ,,, | Performed by: INTERNAL MEDICINE

## 2025-03-17 PROCEDURE — 96375 TX/PRO/DX INJ NEW DRUG ADDON: CPT

## 2025-03-17 PROCEDURE — 25000003 PHARM REV CODE 250: Performed by: EMERGENCY MEDICINE

## 2025-03-17 PROCEDURE — 85025 COMPLETE CBC W/AUTO DIFF WBC: CPT | Performed by: EMERGENCY MEDICINE

## 2025-03-17 PROCEDURE — 81000 URINALYSIS NONAUTO W/SCOPE: CPT | Performed by: EMERGENCY MEDICINE

## 2025-03-17 PROCEDURE — 83735 ASSAY OF MAGNESIUM: CPT | Performed by: EMERGENCY MEDICINE

## 2025-03-17 PROCEDURE — 36415 COLL VENOUS BLD VENIPUNCTURE: CPT | Performed by: EMERGENCY MEDICINE

## 2025-03-17 PROCEDURE — 87389 HIV-1 AG W/HIV-1&-2 AB AG IA: CPT | Performed by: EMERGENCY MEDICINE

## 2025-03-17 PROCEDURE — 25500020 PHARM REV CODE 255: Performed by: EMERGENCY MEDICINE

## 2025-03-17 PROCEDURE — 63600175 PHARM REV CODE 636 W HCPCS: Performed by: EMERGENCY MEDICINE

## 2025-03-17 PROCEDURE — 86803 HEPATITIS C AB TEST: CPT | Performed by: EMERGENCY MEDICINE

## 2025-03-17 PROCEDURE — 80053 COMPREHEN METABOLIC PANEL: CPT | Performed by: EMERGENCY MEDICINE

## 2025-03-17 PROCEDURE — 96372 THER/PROPH/DIAG INJ SC/IM: CPT | Performed by: FAMILY MEDICINE

## 2025-03-17 PROCEDURE — 63600175 PHARM REV CODE 636 W HCPCS: Performed by: FAMILY MEDICINE

## 2025-03-17 PROCEDURE — 63600175 PHARM REV CODE 636 W HCPCS: Mod: JZ,TB | Performed by: FAMILY MEDICINE

## 2025-03-17 PROCEDURE — 25000003 PHARM REV CODE 250: Performed by: FAMILY MEDICINE

## 2025-03-17 PROCEDURE — 93005 ELECTROCARDIOGRAM TRACING: CPT

## 2025-03-17 PROCEDURE — A9585 GADOBUTROL INJECTION: HCPCS | Performed by: EMERGENCY MEDICINE

## 2025-03-17 PROCEDURE — 84484 ASSAY OF TROPONIN QUANT: CPT | Performed by: EMERGENCY MEDICINE

## 2025-03-17 RX ORDER — NALOXONE HCL 0.4 MG/ML
0.02 VIAL (ML) INJECTION
Status: DISCONTINUED | OUTPATIENT
Start: 2025-03-17 | End: 2025-03-20 | Stop reason: HOSPADM

## 2025-03-17 RX ORDER — OXYCODONE HYDROCHLORIDE 5 MG/1
5 TABLET ORAL EVERY 6 HOURS PRN
Refills: 0 | Status: DISCONTINUED | OUTPATIENT
Start: 2025-03-17 | End: 2025-03-20 | Stop reason: HOSPADM

## 2025-03-17 RX ORDER — ONDANSETRON 8 MG/1
8 TABLET, ORALLY DISINTEGRATING ORAL EVERY 8 HOURS PRN
Status: DISCONTINUED | OUTPATIENT
Start: 2025-03-17 | End: 2025-03-20 | Stop reason: HOSPADM

## 2025-03-17 RX ORDER — AMLODIPINE BESYLATE 10 MG/1
10 TABLET ORAL NIGHTLY
Status: DISCONTINUED | OUTPATIENT
Start: 2025-03-17 | End: 2025-03-20

## 2025-03-17 RX ORDER — POTASSIUM CHLORIDE 20 MEQ/1
40 TABLET, EXTENDED RELEASE ORAL 2 TIMES DAILY
Status: DISCONTINUED | OUTPATIENT
Start: 2025-03-17 | End: 2025-03-20 | Stop reason: HOSPADM

## 2025-03-17 RX ORDER — ZOLPIDEM TARTRATE 5 MG/1
5 TABLET ORAL NIGHTLY PRN
Status: DISCONTINUED | OUTPATIENT
Start: 2025-03-17 | End: 2025-03-20 | Stop reason: HOSPADM

## 2025-03-17 RX ORDER — PROMETHAZINE HYDROCHLORIDE 25 MG/1
25 TABLET ORAL EVERY 6 HOURS PRN
Status: DISCONTINUED | OUTPATIENT
Start: 2025-03-17 | End: 2025-03-20 | Stop reason: HOSPADM

## 2025-03-17 RX ORDER — TRAMADOL HYDROCHLORIDE 50 MG/1
50 TABLET ORAL
Status: COMPLETED | OUTPATIENT
Start: 2025-03-17 | End: 2025-03-18

## 2025-03-17 RX ORDER — GADOBUTROL 604.72 MG/ML
10 INJECTION INTRAVENOUS
Status: COMPLETED | OUTPATIENT
Start: 2025-03-17 | End: 2025-03-17

## 2025-03-17 RX ORDER — ACETAMINOPHEN 325 MG/1
650 TABLET ORAL EVERY 4 HOURS PRN
Status: DISCONTINUED | OUTPATIENT
Start: 2025-03-17 | End: 2025-03-20 | Stop reason: HOSPADM

## 2025-03-17 RX ORDER — DIPHENHYDRAMINE HYDROCHLORIDE 50 MG/ML
25 INJECTION, SOLUTION INTRAMUSCULAR; INTRAVENOUS
Status: COMPLETED | OUTPATIENT
Start: 2025-03-17 | End: 2025-03-17

## 2025-03-17 RX ORDER — CEFTRIAXONE 1 G/1
1 INJECTION, POWDER, FOR SOLUTION INTRAMUSCULAR; INTRAVENOUS
Status: DISCONTINUED | OUTPATIENT
Start: 2025-03-18 | End: 2025-03-20 | Stop reason: HOSPADM

## 2025-03-17 RX ORDER — LIDOCAINE 50 MG/G
1 PATCH TOPICAL
Status: DISCONTINUED | OUTPATIENT
Start: 2025-03-17 | End: 2025-03-20 | Stop reason: HOSPADM

## 2025-03-17 RX ORDER — NICOTINE 7MG/24HR
1 PATCH, TRANSDERMAL 24 HOURS TRANSDERMAL DAILY
Status: DISCONTINUED | OUTPATIENT
Start: 2025-03-18 | End: 2025-03-18

## 2025-03-17 RX ORDER — SODIUM CHLORIDE 9 MG/ML
INJECTION, SOLUTION INTRAVENOUS CONTINUOUS
Status: DISCONTINUED | OUTPATIENT
Start: 2025-03-17 | End: 2025-03-20 | Stop reason: HOSPADM

## 2025-03-17 RX ORDER — VALSARTAN 160 MG/1
320 TABLET ORAL NIGHTLY
Status: DISCONTINUED | OUTPATIENT
Start: 2025-03-17 | End: 2025-03-20

## 2025-03-17 RX ORDER — MORPHINE SULFATE 2 MG/ML
1 INJECTION, SOLUTION INTRAMUSCULAR; INTRAVENOUS EVERY 12 HOURS PRN
Status: DISCONTINUED | OUTPATIENT
Start: 2025-03-17 | End: 2025-03-20 | Stop reason: HOSPADM

## 2025-03-17 RX ORDER — IBUPROFEN 200 MG
16 TABLET ORAL
Status: DISCONTINUED | OUTPATIENT
Start: 2025-03-17 | End: 2025-03-20 | Stop reason: HOSPADM

## 2025-03-17 RX ORDER — IBUPROFEN 200 MG
24 TABLET ORAL
Status: DISCONTINUED | OUTPATIENT
Start: 2025-03-17 | End: 2025-03-20 | Stop reason: HOSPADM

## 2025-03-17 RX ORDER — KETOROLAC TROMETHAMINE 30 MG/ML
15 INJECTION, SOLUTION INTRAMUSCULAR; INTRAVENOUS EVERY 6 HOURS PRN
Status: DISPENSED | OUTPATIENT
Start: 2025-03-17 | End: 2025-03-20

## 2025-03-17 RX ORDER — TRAMADOL HYDROCHLORIDE 50 MG/1
50 TABLET ORAL EVERY 6 HOURS PRN
Status: DISCONTINUED | OUTPATIENT
Start: 2025-03-17 | End: 2025-03-17

## 2025-03-17 RX ORDER — CALCITONIN SALMON 200 [USP'U]/ML
4 INJECTION, SOLUTION INTRAMUSCULAR; SUBCUTANEOUS EVERY 12 HOURS
Status: DISCONTINUED | OUTPATIENT
Start: 2025-03-17 | End: 2025-03-18

## 2025-03-17 RX ORDER — ZOLEDRONIC ACID 0.04 MG/ML
4 INJECTION, SOLUTION INTRAVENOUS
Status: COMPLETED | OUTPATIENT
Start: 2025-03-17 | End: 2025-03-17

## 2025-03-17 RX ORDER — AMLODIPINE AND VALSARTAN 10; 320 MG/1; MG/1
1 TABLET ORAL NIGHTLY
Status: DISCONTINUED | OUTPATIENT
Start: 2025-03-17 | End: 2025-03-17

## 2025-03-17 RX ORDER — TALC
6 POWDER (GRAM) TOPICAL NIGHTLY PRN
Status: DISCONTINUED | OUTPATIENT
Start: 2025-03-17 | End: 2025-03-17

## 2025-03-17 RX ORDER — SODIUM CHLORIDE 0.9 % (FLUSH) 0.9 %
10 SYRINGE (ML) INJECTION EVERY 8 HOURS PRN
Status: DISCONTINUED | OUTPATIENT
Start: 2025-03-17 | End: 2025-03-20 | Stop reason: HOSPADM

## 2025-03-17 RX ORDER — GLUCAGON 1 MG
1 KIT INJECTION
Status: DISCONTINUED | OUTPATIENT
Start: 2025-03-17 | End: 2025-03-20 | Stop reason: HOSPADM

## 2025-03-17 RX ORDER — HYDROXYZINE HYDROCHLORIDE 25 MG/ML
25 INJECTION, SOLUTION INTRAMUSCULAR ONCE
Status: COMPLETED | OUTPATIENT
Start: 2025-03-17 | End: 2025-03-17

## 2025-03-17 RX ORDER — MORPHINE SULFATE 4 MG/ML
4 INJECTION, SOLUTION INTRAMUSCULAR; INTRAVENOUS
Refills: 0 | Status: COMPLETED | OUTPATIENT
Start: 2025-03-17 | End: 2025-03-17

## 2025-03-17 RX ORDER — HYDRALAZINE HYDROCHLORIDE 20 MG/ML
10 INJECTION INTRAMUSCULAR; INTRAVENOUS EVERY 6 HOURS PRN
Status: DISCONTINUED | OUTPATIENT
Start: 2025-03-17 | End: 2025-03-20 | Stop reason: HOSPADM

## 2025-03-17 RX ORDER — TALC
6 POWDER (GRAM) TOPICAL NIGHTLY PRN
Status: DISCONTINUED | OUTPATIENT
Start: 2025-03-17 | End: 2025-03-20 | Stop reason: HOSPADM

## 2025-03-17 RX ORDER — ENOXAPARIN SODIUM 100 MG/ML
40 INJECTION SUBCUTANEOUS EVERY 24 HOURS
Status: DISCONTINUED | OUTPATIENT
Start: 2025-03-17 | End: 2025-03-20 | Stop reason: HOSPADM

## 2025-03-17 RX ADMIN — DIPHENHYDRAMINE HYDROCHLORIDE 25 MG: 50 INJECTION, SOLUTION INTRAMUSCULAR; INTRAVENOUS at 11:03

## 2025-03-17 RX ADMIN — SODIUM CHLORIDE 1000 ML: 9 INJECTION, SOLUTION INTRAVENOUS at 07:03

## 2025-03-17 RX ADMIN — TRAMADOL HYDROCHLORIDE 50 MG: 50 TABLET, COATED ORAL at 09:03

## 2025-03-17 RX ADMIN — LACTULOSE 20 G: 20 SOLUTION ORAL at 04:03

## 2025-03-17 RX ADMIN — ENOXAPARIN SODIUM 40 MG: 40 INJECTION SUBCUTANEOUS at 04:03

## 2025-03-17 RX ADMIN — SODIUM CHLORIDE: 9 INJECTION, SOLUTION INTRAVENOUS at 03:03

## 2025-03-17 RX ADMIN — LIDOCAINE 1 PATCH: 50 PATCH TOPICAL at 06:03

## 2025-03-17 RX ADMIN — OXYCODONE HYDROCHLORIDE 5 MG: 5 TABLET ORAL at 06:03

## 2025-03-17 RX ADMIN — CALCITONIN SALMON 268 UNITS: 200 INJECTION, SOLUTION INTRAMUSCULAR; SUBCUTANEOUS at 07:03

## 2025-03-17 RX ADMIN — AMLODIPINE BESYLATE 10 MG: 10 TABLET ORAL at 09:03

## 2025-03-17 RX ADMIN — KETOROLAC TROMETHAMINE 15 MG: 30 INJECTION, SOLUTION INTRAMUSCULAR at 07:03

## 2025-03-17 RX ADMIN — Medication 6 MG: at 09:03

## 2025-03-17 RX ADMIN — HYDROXYZINE HYDROCHLORIDE 25 MG: 25 INJECTION, SOLUTION INTRAMUSCULAR at 04:03

## 2025-03-17 RX ADMIN — MORPHINE SULFATE 4 MG: 4 INJECTION INTRAVENOUS at 07:03

## 2025-03-17 RX ADMIN — POTASSIUM CHLORIDE 40 MEQ: 1500 TABLET, EXTENDED RELEASE ORAL at 09:03

## 2025-03-17 RX ADMIN — GADOBUTROL 6 ML: 604.72 INJECTION INTRAVENOUS at 11:03

## 2025-03-17 RX ADMIN — ZOLEDRONIC ACID 4 MG: 0.04 INJECTION, SOLUTION INTRAVENOUS at 02:03

## 2025-03-17 RX ADMIN — SODIUM CHLORIDE: 9 INJECTION, SOLUTION INTRAVENOUS at 08:03

## 2025-03-17 RX ADMIN — SODIUM CHLORIDE 500 ML: 9 INJECTION, SOLUTION INTRAVENOUS at 02:03

## 2025-03-17 RX ADMIN — VALSARTAN 320 MG: 160 TABLET, FILM COATED ORAL at 09:03

## 2025-03-17 NOTE — SUBJECTIVE & OBJECTIVE
Past Medical History:   Diagnosis Date    Arthritis     Early dry stage nonexudative age-related macular degeneration of both eyes 9/28/2020    Erectile dysfunction     Hyperlipidemia LDL goal < 160     Hypertension     Seasonal allergies        Past Surgical History:   Procedure Laterality Date    CATARACT EXTRACTION W/ INTRAOCULAR LENS IMPLANT Right 06/10/2021    COLONOSCOPY N/A 02/29/2016    Procedure: COLONOSCOPY;  Surgeon: Aruna Rocha MD;  Location: [facility] ENDO;  Service: Endoscopy;  Laterality: N/A;    COLONOSCOPY N/A 06/07/2019    Procedure: COLONOSCOPY;  Surgeon: Jacobo Sigala MD;  Location: [facility] ENDO;  Service: Endoscopy;  Laterality: N/A;    COLONOSCOPY N/A 09/09/2022    Procedure: COLONOSCOPY;  Surgeon: Jacobo Sigala MD;  Location: [facility];  Service: General;  Laterality: N/A;    WISDOM TOOTH EXTRACTION         Review of patient's allergies indicates:   Allergen Reactions    Statins-hmg-coa reductase inhibitors      Myalgia       No current facility-administered medications on file prior to encounter.     Current Outpatient Medications on File Prior to Encounter   Medication Sig    amlodipine-valsartan (EXFORGE)  mg per tablet Take 1 tablet by mouth every evening.    lactulose (CHRONULAC) 20 gram/30 mL Soln Take 30 mLs (20 g total) by mouth once daily.    metoprolol succinate (TOPROL-XL) 200 MG 24 hr tablet Take 1 tablet (200 mg total) by mouth every evening.    traMADoL (ULTRAM) 50 mg tablet Take 1 tablet (50 mg total) by mouth every 6 (six) hours as needed for Pain.    bisacodyL (DULCOLAX, BISACODYL,) 5 mg EC tablet Take 1 tablet (5 mg total) by mouth daily as needed for Constipation.    cyanocobalamin (VITAMIN B-12) 1000 MCG tablet Take 1 tablet (1,000 mcg total) by mouth once daily.    lenvatinib (LENVIMA) 20 mg/day (10 mg x 2) Cap Take 20 mg by mouth once daily.    ondansetron (ZOFRAN-ODT) 8 MG TbDL Take 1 tablet (8 mg total) by mouth every 8 (eight) hours as needed (nausea).  Take 1 tablet (8 mg) by mouth 30 minutes prior to oral chemotherapy and every 8 hours as needed for nausea/vomiting (MAX = 3 tablets in 24 hours).    polyethylene glycol (MIRALAX) 17 gram/dose powder Take 17 g by mouth 2 (two) times daily.    PROPYLENE GLYCOL/ (SYSTANE OPHT) Apply to eye.    senna-docusate 8.6-50 mg (SENNA WITH DOCUSATE SODIUM) 8.6-50 mg per tablet Take 1 tablet by mouth once daily.    tadalafiL (CIALIS) 10 MG tablet 1 tablet at least 30 minutes prior to sexual activity; not more than once daily     Family History       Problem Relation (Age of Onset)    Arthritis Mother    Cancer Father    Hypertension Other    Lymphoma Mother    Macular degeneration Mother    Melanoma Father    Stroke Paternal Grandfather    Vision loss Mother          Tobacco Use    Smoking status: Former     Current packs/day: 0.00     Average packs/day: 0.5 packs/day for 60.0 years (30.0 ttl pk-yrs)     Types: Cigarettes     Start date:      Quit date: 2025     Years since quittin.2    Smokeless tobacco: Never   Substance and Sexual Activity    Alcohol use: Not Currently     Comment: social use of alchol    Drug use: No    Sexual activity: Not Currently     Partners: Female     Birth control/protection: None     Review of Systems   Constitutional:  Positive for activity change, appetite change, fatigue and unexpected weight change.   Respiratory:  Positive for shortness of breath.    Gastrointestinal:  Negative for abdominal pain, nausea and vomiting.   Genitourinary:  Negative for decreased urine volume and difficulty urinating.   Musculoskeletal:  Positive for back pain.   Neurological:  Positive for weakness.   Psychiatric/Behavioral:  Positive for decreased concentration.      Objective:     Vital Signs (Most Recent):  Temp: 98.4 °F (36.9 °C) (25 1438)  Pulse: 68 (25 1519)  Resp: 18 (25 1438)  BP: (!) 168/74 (25 1438)  SpO2: 95 % (25 1601) Vital Signs (24h Range):  Temp:  [98  °F (36.7 °C)-98.4 °F (36.9 °C)] 98.4 °F (36.9 °C)  Pulse:  [68-98] 68  Resp:  [15-20] 18  SpO2:  [89 %-97 %] 95 %  BP: (136-168)/(66-82) 168/74     Weight: 73 kg (160 lb 15 oz)  Body mass index is 25.98 kg/m².     Physical Exam  Vitals and nursing note reviewed. Exam conducted with a chaperone present (neighbor, nursing).   Constitutional:       General: He is not in acute distress.     Appearance: He is ill-appearing. He is not toxic-appearing.      Interventions: Nasal cannula in place.   HENT:      Head: Normocephalic and atraumatic.   Cardiovascular:      Rate and Rhythm: Normal rate.   Pulmonary:      Effort: Pulmonary effort is normal. No respiratory distress.      Breath sounds: Decreased air movement present.   Abdominal:      Palpations: Abdomen is soft.      Tenderness: There is no abdominal tenderness.   Musculoskeletal:        Arms:       Right lower leg: No edema.      Left lower leg: No edema.   Skin:     General: Skin is warm.      Coloration: Skin is pale.   Neurological:      Mental Status: He is alert.      Motor: Weakness present.                Significant Labs: All pertinent labs within the past 24 hours have been reviewed.  CBC:   Recent Labs   Lab 03/17/25  0647   WBC 7.48   HGB 10.8*   HCT 33.8*        CMP:   Recent Labs   Lab 03/17/25  0647   *   K 3.1*   CL 95   CO2 28   GLU 97   BUN 15   CREATININE 0.9   CALCIUM 14.8*   PROT 7.1   ALBUMIN 2.8*   BILITOT 0.6   ALKPHOS 88   AST 17   ALT 18   ANIONGAP 12     Cardiac Markers:   Recent Labs   Lab 03/17/25  0647   BNP 12     Magnesium: pending  Troponin:   Recent Labs   Lab 03/17/25  0647   TROPONINI 0.017     Urine Studies:   Recent Labs   Lab 03/17/25  0726   COLORU Brown*   APPEARANCEUA Cloudy*   PHUR 7.0   SPECGRAV 1.010   PROTEINUA 2+*   GLUCUA Negative   KETONESU Negative   BILIRUBINUA Negative   OCCULTUA 3+*   NITRITE Negative   UROBILINOGEN Negative   LEUKOCYTESUR 1+*   RBCUA >100*   WBCUA 3   BACTERIA Rare   HYALINECASTS 0      Pth 5.1    Significant Imaging: I have reviewed all pertinent imaging results/findings within the past 24 hours.  CT: I have reviewed all pertinent results/findings within the past 24 hours and my personal findings are:  renal mass with pulmonary mets, foraminal stenosis; ct head without contrast with chronic findings  CXR: I have reviewed all pertinent results/findings within the past 24 hours and my personal findings are:  no acute process  U/S: I have reviewed all pertinent results/findings within the past 24 hours and my personal findings are:  no parathyroid tissue of note  Mri showing same as ct lumbar; xray pelvis without fracture

## 2025-03-17 NOTE — HPI
74M  has a past medical history of Arthritis, Early dry stage nonexudative age-related macular degeneration of both eyes, Erectile dysfunction, Hyperlipidemia LDL goal < 160, Hypertension, and Seasonal allergies.  Presents to emergency department for generalized weakness and falls. Associated with back pain and weight loss. Recently seen in hem/onc for renal cell carcinoma with pulmonary mets. Denies bladder or bowel incontinence. Neighbor present and endorses improvement in mentation since being evaluated in emergency department. Does not wear supplemental oxygen at baseline.     Initial workup in emergency department reveals hypertension, hypoxia on room air, improved with nasal cannula. Afebrile. Cbc with normocytic anemia. Cmp showing mild hyponatremia, hypokalemia and hypercalcemia. Bnp, ck, and troponin(s) within normal limits. Pth 5.1 urinalysis with hematuria. Ct head without contrast with chronic microvascular ischemic findings. Ct lumbar spine without contrast showing large right renal mass with pulmonary mets but no bony mets and L5-S1 foraminal stenosis. Chest x-ray with no active disease. Xray pelvis with on fracture. Mri lumbar spine with and without contrast showing same as ct lumbar spine. Us thyroid with no definite parathyroid tissue.    Hospital medicine consulted for hypercalcemia of malignancy. Hem/onc consulted.

## 2025-03-17 NOTE — SUBJECTIVE & OBJECTIVE
VIRTUAL TELENOTE    Start time:5:35pm  Chief complaint: hypercalcemia   The patient location is: [facility]  The patient arrived at: 5:35pm  Present with the patient at the time of the telemed/virtual assessment:family         End time:  5:55pm    Total time spent with patient: 20 min  Total time spent during visit : 45 min    The attending portion of this evaluation, treatment, and documentation was performed per Antonia Segovia MD via AudioVisual Assisted with secure video- assisted technology with ENOV[employer] software platform. Both the provider and the patient were located in the hospital and utilized 2 way audio/video to document history and physical exam. .             Oncology Treatment Plan:   OP LENVATINIB 20 MG DAILY + PEMBROLIZUMAB 400 MG Q6W    Medications:  Continuous Infusions:   0.9% NaCl   Intravenous Continuous 125 mL/hr at 03/17/25 1556 New Bag at 03/17/25 1556     Scheduled Meds:   amLODIPine  10 mg Oral QHS    calcitonin  4 Units/kg Subcutaneous Q12H    enoxparin  40 mg Subcutaneous Daily    lactulose  20 g Oral Daily    LIDOcaine  1 patch Transdermal Q24H    [START ON 3/18/2025] nicotine  1 patch Transdermal Daily    potassium chloride  40 mEq Oral BID    traMADoL  50 mg Oral Q6H WAKE    valsartan  320 mg Oral QHS     PRN Meds:  Current Facility-Administered Medications:     acetaminophen, 650 mg, Oral, Q4H PRN    dextrose 50%, 12.5 g, Intravenous, PRN    dextrose 50%, 25 g, Intravenous, PRN    glucagon (human recombinant), 1 mg, Intramuscular, PRN    glucose, 16 g, Oral, PRN    glucose, 24 g, Oral, PRN    hydrALAZINE, 10 mg, Intravenous, Q6H PRN    ketorolac, 15 mg, Intravenous, Q6H PRN    melatonin, 6 mg, Oral, Nightly PRN    morphine, 1 mg, Intravenous, Q12H PRN    naloxone, 0.02 mg, Intravenous, PRN    ondansetron, 8 mg, Oral, Q8H PRN    oxyCODONE, 5 mg, Oral, Q6H PRN    promethazine, 25 mg, Oral, Q6H PRN    sodium chloride 0.9%, 10 mL, Intravenous, Q8H PRN     Review of patient's allergies  indicates:   Allergen Reactions    Statins-hmg-coa reductase inhibitors      Myalgia        Past Medical History:   Diagnosis Date    Arthritis     Early dry stage nonexudative age-related macular degeneration of both eyes 2020    Erectile dysfunction     Hyperlipidemia LDL goal < 160     Hypertension     Seasonal allergies      Past Surgical History:   Procedure Laterality Date    CATARACT EXTRACTION W/ INTRAOCULAR LENS IMPLANT Right 06/10/2021    COLONOSCOPY N/A 2016    Procedure: COLONOSCOPY;  Surgeon: Aruna Rocha MD;  Location: [facility] ENDO;  Service: Endoscopy;  Laterality: N/A;    COLONOSCOPY N/A 2019    Procedure: COLONOSCOPY;  Surgeon: Jacobo Sigala MD;  Location: [facility] ENDO;  Service: Endoscopy;  Laterality: N/A;    COLONOSCOPY N/A 2022    Procedure: COLONOSCOPY;  Surgeon: Jacobo Sigala MD;  Location: [facility];  Service: General;  Laterality: N/A;    WISDOM TOOTH EXTRACTION       Family History       Problem Relation (Age of Onset)    Arthritis Mother    Cancer Father    Hypertension Other    Lymphoma Mother    Macular degeneration Mother    Melanoma Father    Stroke Paternal Grandfather    Vision loss Mother          Tobacco Use    Smoking status: Former     Current packs/day: 0.00     Average packs/day: 0.5 packs/day for 60.0 years (30.0 ttl pk-yrs)     Types: Cigarettes     Start date:      Quit date: 2025     Years since quittin.2    Smokeless tobacco: Never   Substance and Sexual Activity    Alcohol use: Not Currently     Comment: social use of alchol    Drug use: No    Sexual activity: Not Currently     Partners: Female     Birth control/protection: None       Review of Systems   Unable to perform ROS: Mental status change     Objective:     Vital Signs (Most Recent):  Temp: 97.4 °F (36.3 °C) (25)  Pulse: 78 (25)  Resp: 16 (25 180)  BP: (!) 154/70 (25)  SpO2: 95 % (25) Vital Signs (24h Range):  Temp:   [97.4 °F (36.3 °C)-98.4 °F (36.9 °C)] 97.4 °F (36.3 °C)  Pulse:  [68-98] 78  Resp:  [15-20] 16  SpO2:  [89 %-97 %] 95 %  BP: (136-168)/(66-82) 154/70     Weight: 67.1 kg (147 lb 14.9 oz)  Body mass index is 23.88 kg/m².  Body surface area is 1.77 meters squared.      Intake/Output Summary (Last 24 hours) at 3/17/2025 1809  Last data filed at 3/17/2025 1453  Gross per 24 hour   Intake 1998 ml   Output 200 ml   Net 1798 ml        Physical Exam     Significant Labs:   All pertinent labs from the last 24 hours have been reviewed.    Diagnostic Results:  I have reviewed and interpreted all pertinent imaging results/findings within the past 24 hours.

## 2025-03-17 NOTE — HPI
74M  has a past medical history of Arthritis,Hyperlipidemia  Hypertension, right sided kidney tumor with associated pulmonary metastases 02/2025 presents to emergency department for generalized weakness and falls and AMS. .  . Family at bedside and provides history as patient has AMS. Pt has had wt loss and  progressive weakness over the last 2 months.Appetite is diminished and  Has lost about 30 lbs over the last two months. No CP, SOB or cough. No headaches.He is followed by Oncologist, Dr. Contreras with plan for outpatient staging workup. Workup in ED includes  lumbar spine without contrast showing large right renal mass with pulmonary mets but no bony mets and L5-S1 foraminal stenosis. Labs on presentation shows Corrected Ca level 15.8mg/dL Consulted for hypercalcemia of malignancy.     Pt has received IVF hydration and Zometa IV 4mg .

## 2025-03-17 NOTE — HOSPITAL COURSE
Pt has received IVF hydration and Zometa IV 4mg .   Unfortunately ekg not completely normal    Cardiology consult has been placed in [place] with Dr Das for stimulant clearance    Please inform pt       Sinus bradycardia   Rightward axis   Incomplete right bundle branch block   Borderline ECG   Confirmed by CHERRY EUGENE, YOSELIN BURGESS ([id number]) on 8/6/2019 8:42:46 PM

## 2025-03-17 NOTE — H&P
[facility] [place] [facility]  History & Physical    Patient Name: Neymar Victoria  MRN: [id number]  Patient Class: OP- Observation  Admission Date: 3/17/2025  Attending Physician: Manuelito Clark MD   Primary Care Provider: Susan Chávez MD         Patient information was obtained from patient, caregiver / friend, and ER records.     Subjective:     Principal Problem:Hypercalcemia of malignancy    Chief Complaint:   Chief Complaint   Patient presents with    Fatigue     Increasing generalized weakness since January, recent dx renal cell ca, multiple falls        HPI: 74M  has a past medical history of Arthritis, Early dry stage nonexudative age-related macular degeneration of both eyes, Erectile dysfunction, Hyperlipidemia LDL goal < 160, Hypertension, and Seasonal allergies.  Presents to emergency department for generalized weakness and falls. Associated with back pain and weight loss. Recently seen in hem/onc for renal cell carcinoma with pulmonary mets. Denies bladder or bowel incontinence. Neighbor present and endorses improvement in mentation since being evaluated in emergency department. Does not wear supplemental oxygen at baseline.     Initial workup in emergency department reveals hypertension, hypoxia on room air, improved with nasal cannula. Afebrile. Cbc with normocytic anemia. Cmp showing mild hyponatremia, hypokalemia and hypercalcemia. Bnp, ck, and troponin(s) within normal limits. Pth 5.1 urinalysis with hematuria. Ct head without contrast with chronic microvascular ischemic findings. Ct lumbar spine without contrast showing large right renal mass with pulmonary mets but no bony mets and L5-S1 foraminal stenosis. Chest x-ray with no active disease. Xray pelvis with on fracture. Mri lumbar spine with and without contrast showing same as ct lumbar spine. Us thyroid with no definite parathyroid tissue.    Hospital medicine consulted for hypercalcemia of malignancy. Hem/onc consulted.    Past  Medical History:   Diagnosis Date    Arthritis     Early dry stage nonexudative age-related macular degeneration of both eyes 9/28/2020    Erectile dysfunction     Hyperlipidemia LDL goal < 160     Hypertension     Seasonal allergies        Past Surgical History:   Procedure Laterality Date    CATARACT EXTRACTION W/ INTRAOCULAR LENS IMPLANT Right 06/10/2021    COLONOSCOPY N/A 02/29/2016    Procedure: COLONOSCOPY;  Surgeon: Aruna Rocha MD;  Location: [facility] ENDO;  Service: Endoscopy;  Laterality: N/A;    COLONOSCOPY N/A 06/07/2019    Procedure: COLONOSCOPY;  Surgeon: Jacobo Sigala MD;  Location: [facility] ENDO;  Service: Endoscopy;  Laterality: N/A;    COLONOSCOPY N/A 09/09/2022    Procedure: COLONOSCOPY;  Surgeon: Jacobo Sigala MD;  Location: [facility];  Service: General;  Laterality: N/A;    WISDOM TOOTH EXTRACTION         Review of patient's allergies indicates:   Allergen Reactions    Statins-hmg-coa reductase inhibitors      Myalgia       No current facility-administered medications on file prior to encounter.     Current Outpatient Medications on File Prior to Encounter   Medication Sig    amlodipine-valsartan (EXFORGE)  mg per tablet Take 1 tablet by mouth every evening.    lactulose (CHRONULAC) 20 gram/30 mL Soln Take 30 mLs (20 g total) by mouth once daily.    metoprolol succinate (TOPROL-XL) 200 MG 24 hr tablet Take 1 tablet (200 mg total) by mouth every evening.    traMADoL (ULTRAM) 50 mg tablet Take 1 tablet (50 mg total) by mouth every 6 (six) hours as needed for Pain.    bisacodyL (DULCOLAX, BISACODYL,) 5 mg EC tablet Take 1 tablet (5 mg total) by mouth daily as needed for Constipation.    cyanocobalamin (VITAMIN B-12) 1000 MCG tablet Take 1 tablet (1,000 mcg total) by mouth once daily.    lenvatinib (LENVIMA) 20 mg/day (10 mg x 2) Cap Take 20 mg by mouth once daily.    ondansetron (ZOFRAN-ODT) 8 MG TbDL Take 1 tablet (8 mg total) by mouth every 8 (eight) hours as needed (nausea). Take  1 tablet (8 mg) by mouth 30 minutes prior to oral chemotherapy and every 8 hours as needed for nausea/vomiting (MAX = 3 tablets in 24 hours).    polyethylene glycol (MIRALAX) 17 gram/dose powder Take 17 g by mouth 2 (two) times daily.    PROPYLENE GLYCOL/ (SYSTANE OPHT) Apply to eye.    senna-docusate 8.6-50 mg (SENNA WITH DOCUSATE SODIUM) 8.6-50 mg per tablet Take 1 tablet by mouth once daily.    tadalafiL (CIALIS) 10 MG tablet 1 tablet at least 30 minutes prior to sexual activity; not more than once daily     Family History       Problem Relation (Age of Onset)    Arthritis Mother    Cancer Father    Hypertension Other    Lymphoma Mother    Macular degeneration Mother    Melanoma Father    Stroke Paternal Grandfather    Vision loss Mother          Tobacco Use    Smoking status: Former     Current packs/day: 0.00     Average packs/day: 0.5 packs/day for 60.0 years (30.0 ttl pk-yrs)     Types: Cigarettes     Start date:      Quit date: 2025     Years since quittin.2    Smokeless tobacco: Never   Substance and Sexual Activity    Alcohol use: Not Currently     Comment: social use of alchol    Drug use: No    Sexual activity: Not Currently     Partners: Female     Birth control/protection: None     Review of Systems   Constitutional:  Positive for activity change, appetite change, fatigue and unexpected weight change.   Respiratory:  Positive for shortness of breath.    Gastrointestinal:  Negative for abdominal pain, nausea and vomiting.   Genitourinary:  Negative for decreased urine volume and difficulty urinating.   Musculoskeletal:  Positive for back pain.   Neurological:  Positive for weakness.   Psychiatric/Behavioral:  Positive for decreased concentration.      Objective:     Vital Signs (Most Recent):  Temp: 98.4 °F (36.9 °C) (25 1438)  Pulse: 68 (25 1519)  Resp: 18 (25 1438)  BP: (!) 168/74 (25 1438)  SpO2: 95 % (25 1601) Vital Signs (24h Range):  Temp:  [98 °F  (36.7 °C)-98.4 °F (36.9 °C)] 98.4 °F (36.9 °C)  Pulse:  [68-98] 68  Resp:  [15-20] 18  SpO2:  [89 %-97 %] 95 %  BP: (136-168)/(66-82) 168/74     Weight: 73 kg (160 lb 15 oz)  Body mass index is 25.98 kg/m².     Physical Exam  Vitals and nursing note reviewed. Exam conducted with a chaperone present (neighbor, nursing).   Constitutional:       General: He is not in acute distress.     Appearance: He is ill-appearing. He is not toxic-appearing.      Interventions: Nasal cannula in place.   HENT:      Head: Normocephalic and atraumatic.   Cardiovascular:      Rate and Rhythm: Normal rate.   Pulmonary:      Effort: Pulmonary effort is normal. No respiratory distress.      Breath sounds: Decreased air movement present.   Abdominal:      Palpations: Abdomen is soft.      Tenderness: There is no abdominal tenderness.   Musculoskeletal:        Arms:       Right lower leg: No edema.      Left lower leg: No edema.   Skin:     General: Skin is warm.      Coloration: Skin is pale.   Neurological:      Mental Status: He is alert.      Motor: Weakness present.                Significant Labs: All pertinent labs within the past 24 hours have been reviewed.  CBC:   Recent Labs   Lab 03/17/25  0647   WBC 7.48   HGB 10.8*   HCT 33.8*        CMP:   Recent Labs   Lab 03/17/25  0647   *   K 3.1*   CL 95   CO2 28   GLU 97   BUN 15   CREATININE 0.9   CALCIUM 14.8*   PROT 7.1   ALBUMIN 2.8*   BILITOT 0.6   ALKPHOS 88   AST 17   ALT 18   ANIONGAP 12     Cardiac Markers:   Recent Labs   Lab 03/17/25  0647   BNP 12     Magnesium: pending  Troponin:   Recent Labs   Lab 03/17/25  0647   TROPONINI 0.017     Urine Studies:   Recent Labs   Lab 03/17/25  0726   COLORU Brown*   APPEARANCEUA Cloudy*   PHUR 7.0   SPECGRAV 1.010   PROTEINUA 2+*   GLUCUA Negative   KETONESU Negative   BILIRUBINUA Negative   OCCULTUA 3+*   NITRITE Negative   UROBILINOGEN Negative   LEUKOCYTESUR 1+*   RBCUA >100*   WBCUA 3   BACTERIA Rare   HYALINECASTS 0      Pth 5.1    Significant Imaging: I have reviewed all pertinent imaging results/findings within the past 24 hours.  CT: I have reviewed all pertinent results/findings within the past 24 hours and my personal findings are:  renal mass with pulmonary mets, foraminal stenosis; ct head without contrast with chronic findings  CXR: I have reviewed all pertinent results/findings within the past 24 hours and my personal findings are:  no acute process  U/S: I have reviewed all pertinent results/findings within the past 24 hours and my personal findings are:  no parathyroid tissue of note  Mri showing same as ct lumbar; xray pelvis without fracture   Assessment/Plan:     * Hypercalcemia of malignancy  Hypercalcemia is likely due to Malignancy. The patient has the following symptoms due to their hypercalcemia: weakness. Their most recent calcium and albumin results are listed below.  Recent Labs     03/17/25  0647   CALCIUM 14.8*   ALBUMIN 2.8*     Plan  - Obtain the following labs to work up the cause of hypercalcemia: PTH   PTH, Intact   Date Value Ref Range Status   03/17/2025 5.1 (L) 9.0 - 77.0 pg/mL Final    .   - Treat the hypercalcemia with: IV fluids ordered at a rate of 125 ml/hr and bolus and zometa .   - The patient's hypercalcemia is  stable .   - repeat calcium in am  Hem/onc consulted   Speech consulted     Hematuria  Likely due to renal cell mass  Consider urology consult if concerns for urinary retention or clots  Bladder scan prn     Renal mass with pulmonary metastasis  Hem/onc consulted   Physical/occupational therapy     Weakness  Physical/occupational therapy pending      Essential hypertension  Patient's blood pressure range in the last 24 hours was: BP  Min: 136/70  Max: 168/74.The patient's inpatient anti-hypertensive regimen is listed below:  Current Antihypertensives  amLODIPine tablet 10 mg, Nightly, Oral  valsartan tablet 320 mg, Nightly, Oral    Plan  - BP is uncontrolled, will adjust as  follows: mildly elevated, receiving fluids, home medication(s) resumed  - intravenous prn hydralazine      VTE Risk Mitigation (From admission, onward)           Ordered     enoxaparin injection 40 mg  Daily         03/17/25 1302     IP VTE HIGH RISK PATIENT  Once         03/17/25 1302     Place sequential compression device  Until discontinued         03/17/25 1302                       On 03/17/2025, patient should be placed in hospital observation services under my care.             Manuelito Clark MD  Department of Hospital Medicine  [facility]

## 2025-03-17 NOTE — ASSESSMENT & PLAN NOTE
Likely due to renal cell mass  Consider urology consult if concerns for urinary retention or clots  Bladder scan prn

## 2025-03-17 NOTE — ED PROVIDER NOTES
SCRIBE #1 NOTE: I, Emmanuel Hines, am scribing for, and in the presence of, Nura Yi MD. I have scribed the entire note.       History     Chief Complaint   Patient presents with    Fatigue     Increasing generalized weakness since January, recent dx renal cell ca, multiple falls     Review of patient's allergies indicates:   Allergen Reactions    Statins-hmg-coa reductase inhibitors      Myalgia         History of Present Illness     HPI    3/17/2025, 6:33 AM  History obtained from the EMS  Patient is a poor historian which limited history.       History of Present Illness: Neymar Victoria is a 74 y.o. male patient who presents to the Emergency Department for evaluation of increased generalized weakness which onset gradually 2 months ago but worsened today. EMS reports patient was recently dx with renal CA and has not started any treatment. They states patient has had multiple falls recently. No further complaints or concerns at this time.       Arrival mode: EMS      PCP: Susan Chávez MD        Past Medical History:  Past Medical History:   Diagnosis Date    Arthritis     Early dry stage nonexudative age-related macular degeneration of both eyes 9/28/2020    Erectile dysfunction     Hyperlipidemia LDL goal < 160     Hypertension     Seasonal allergies        Past Surgical History:  Past Surgical History:   Procedure Laterality Date    CATARACT EXTRACTION W/ INTRAOCULAR LENS IMPLANT Right 06/10/2021    COLONOSCOPY N/A 02/29/2016    Procedure: COLONOSCOPY;  Surgeon: Aruna Rocha MD;  Location: [facility];  Service: Endoscopy;  Laterality: N/A;    COLONOSCOPY N/A 06/07/2019    Procedure: COLONOSCOPY;  Surgeon: Jacobo Sigala MD;  Location: [facility];  Service: Endoscopy;  Laterality: N/A;    COLONOSCOPY N/A 09/09/2022    Procedure: COLONOSCOPY;  Surgeon: Jacobo Sigala MD;  Location: [facility];  Service: General;  Laterality: N/A;    WISDOM TOOTH EXTRACTION           Family History:  Family  History   Problem Relation Name Age of Onset    Macular degeneration Mother Johan Victoria     Arthritis Mother Johan Victoria     Lymphoma Mother Johan Victoria     Vision loss Mother Johan Victoria     Melanoma Father Nilesh Victoria     Cancer Father Nilesh Victoria     Stroke Paternal Grandfather Neymar Victoria     Hypertension Other      Psoriasis Neg Hx      Lupus Neg Hx      Colon cancer Neg Hx      Uterine cancer Neg Hx      Prostate cancer Neg Hx      Bladder Cancer Neg Hx      Kidney cancer Neg Hx         Social History:  Social History     Tobacco Use    Smoking status: Former     Current packs/day: 0.00     Average packs/day: 0.5 packs/day for 60.0 years (30.0 ttl pk-yrs)     Types: Cigarettes     Start date:      Quit date: 2025     Years since quittin.2    Smokeless tobacco: Never   Substance and Sexual Activity    Alcohol use: Not Currently     Comment: social use of alchol    Drug use: No    Sexual activity: Not Currently     Partners: Female     Birth control/protection: None        Review of Systems     Review of Systems   Unable to perform ROS: Other (Poor historian)        Physical Exam     Initial Vitals [25 0628]   BP Pulse Resp Temp SpO2   136/70 92 18 98 °F (36.7 °C) 95 %      MAP       --          Physical Exam   Nursing Notes and Vital Signs Reviewed.  Constitutional: Patient is in mild distress. Elderly. frail.  Head: Atraumatic. Normocephalic.  Eyes: PERRL. EOM intact. Conjunctivae are not pale. No scleral icterus.  ENT: Mucous membranes are moist. Oropharynx is clear and symmetric.    Neck: Supple. Full ROM. No lymphadenopathy.  Cardiovascular: Regular rate. Regular rhythm. No murmurs, rubs, or gallops. Distal pulses are 2+ and symmetric.  Pulmonary/Chest: No respiratory distress. Clear to auscultation bilaterally. No wheezing or rales.  Abdominal: Soft and non-distended.  There is no tenderness.  No rebound, guarding, or rigidity. Good bowel sounds.  Genitourinary: No  "CVA tenderness  Musculoskeletal: Moves all extremities. No obvious deformities. No edema. No calf tenderness.  Skin: Warm and dry.  Neurological:   Normal speech.  No acute focal neurological deficits are appreciated.  Psychiatric: Normal affect. Good eye contact. Appropriate in content.     ED Course   Procedures  ED Vital Signs:  Vitals:    03/17/25 0628 03/17/25 0654 03/17/25 0736 03/17/25 0802   BP: 136/70  (!) 166/79 (!) 156/70   Pulse: 92 97 84    Resp: 18  19    Temp: 98 °F (36.7 °C)      TempSrc: Oral      SpO2: 95%  (!) 94%    Weight: 67.1 kg (148 lb)      Height: 5' 6" (1.676 m)       03/17/25 0840 03/17/25 0902 03/17/25 1003 03/17/25 1006   BP:  (!) 160/69 (!) 158/77    Pulse: 75 74  98   Resp: 15 16  19   Temp:       TempSrc:       SpO2: 95% 95%  (!) 93%   Weight:       Height:        03/17/25 1051 03/17/25 1213 03/17/25 1225 03/17/25 1229   BP:  (!) 152/70     Pulse: 92      Resp: 16      Temp:       TempSrc:       SpO2: (!) 93%  (!) 90% (!) 89%   Weight:       Height:        03/17/25 1230 03/17/25 1233 03/17/25 1238   BP:  (!) 147/66    Pulse:   68   Resp:   18   Temp:      TempSrc:      SpO2: (!) 91%  97%   Weight:      Height:          Abnormal Lab Results:  Labs Reviewed   CBC W/ AUTO DIFFERENTIAL - Abnormal       Result Value    WBC 7.48      RBC 4.02 (*)     Hemoglobin 10.8 (*)     Hematocrit 33.8 (*)     MCV 84      MCH 26.9 (*)     MCHC 32.0      RDW 14.5      Platelets 341      MPV 8.6 (*)     Immature Granulocytes 0.4      Gran # (ANC) 6.1      Immature Grans (Abs) 0.03      Lymph # 0.6 (*)     Mono # 0.7      Eos # 0.1      Baso # 0.02      nRBC 0      Gran % 80.8 (*)     Lymph % 8.6 (*)     Mono % 9.2      Eosinophil % 0.7      Basophil % 0.3      Differential Method Automated      Narrative:     Release to patient->Immediate   COMPREHENSIVE METABOLIC PANEL - Abnormal    Sodium 135 (*)     Potassium 3.1 (*)     Chloride 95      CO2 28      Glucose 97      BUN 15      Creatinine 0.9      " Calcium 14.8 (*)     Total Protein 7.1      Albumin 2.8 (*)     Total Bilirubin 0.6      Alkaline Phosphatase 88      AST 17      ALT 18      eGFR >60      Anion Gap 12      Narrative:     Release to patient->Immediate   CALCIUM  critical result(s) called and verbal readback obtained from   JEFERSON PECK RN by JESSICA 03/17/2025 07:32   URINALYSIS, REFLEX TO URINE CULTURE - Abnormal    Specimen UA Urine, Clean Catch      Color, UA Brown (*)     Appearance, UA Cloudy (*)     pH, UA 7.0      Specific Gravity, UA 1.010      Protein, UA 2+ (*)     Glucose, UA Negative      Ketones, UA Negative      Bilirubin (UA) Negative      Occult Blood UA 3+ (*)     Nitrite, UA Negative      Urobilinogen, UA Negative      Leukocytes, UA 1+ (*)     Narrative:     Specimen Source->Urine   URINALYSIS MICROSCOPIC - Abnormal    RBC, UA >100 (*)     WBC, UA 3      Bacteria Rare      Hyaline Casts, UA 0      Microscopic Comment SEE COMMENT      Narrative:     Specimen Source->Urine   B-TYPE NATRIURETIC PEPTIDE    BNP 12      Narrative:     Release to patient->Immediate   CK    CPK 46      Narrative:     Release to patient->Immediate   TROPONIN I    Troponin I 0.017      Narrative:     Release to patient->Immediate   HEPATITIS C ANTIBODY    Hepatitis C Ab Negative      Narrative:     Release to patient->Immediate   HEP C VIRUS HOLD SPECIMEN    HEP C Virus Hold Specimen Hold for HCV sendout      Narrative:     Release to patient->Immediate   HIV 1 / 2 ANTIBODY    HIV 1/2 Ag/Ab Negative      Narrative:     Release to patient->Immediate   PTH, INTACT        All Lab Results:  Results for orders placed or performed during the hospital encounter of 03/17/25   EKG 12-lead    Collection Time: 03/17/25  6:37 AM   Result Value Ref Range    QRS Duration 102 ms    [facility] QTC Calculation 440 ms   CBC Auto Differential    Collection Time: 03/17/25  6:47 AM   Result Value Ref Range    WBC 7.48 3.90 - 12.70 K/uL    RBC 4.02 (L) 4.60 - 6.20 M/uL    Hemoglobin  10.8 (L) 14.0 - 18.0 g/dL    Hematocrit 33.8 (L) 40.0 - 54.0 %    MCV 84 82 - 98 fL    MCH 26.9 (L) 27.0 - 31.0 pg    MCHC 32.0 32.0 - 36.0 g/dL    RDW 14.5 11.5 - 14.5 %    Platelets 341 150 - 450 K/uL    MPV 8.6 (L) 9.2 - 12.9 fL    Immature Granulocytes 0.4 0.0 - 0.5 %    Gran # (ANC) 6.1 1.8 - 7.7 K/uL    Immature Grans (Abs) 0.03 0.00 - 0.04 K/uL    Lymph # 0.6 (L) 1.0 - 4.8 K/uL    Mono # 0.7 0.3 - 1.0 K/uL    Eos # 0.1 0.0 - 0.5 K/uL    Baso # 0.02 0.00 - 0.20 K/uL    nRBC 0 0 /100 WBC    Gran % 80.8 (H) 38.0 - 73.0 %    Lymph % 8.6 (L) 18.0 - 48.0 %    Mono % 9.2 4.0 - 15.0 %    Eosinophil % 0.7 0.0 - 8.0 %    Basophil % 0.3 0.0 - 1.9 %    Differential Method Automated    Comprehensive Metabolic Panel    Collection Time: 03/17/25  6:47 AM   Result Value Ref Range    Sodium 135 (L) 136 - 145 mmol/L    Potassium 3.1 (L) 3.5 - 5.1 mmol/L    Chloride 95 95 - 110 mmol/L    CO2 28 23 - 29 mmol/L    Glucose 97 70 - 110 mg/dL    BUN 15 8 - 23 mg/dL    Creatinine 0.9 0.5 - 1.4 mg/dL    Calcium 14.8 (HH) 8.7 - 10.5 mg/dL    Total Protein 7.1 6.0 - 8.4 g/dL    Albumin 2.8 (L) 3.5 - 5.2 g/dL    Total Bilirubin 0.6 0.1 - 1.0 mg/dL    Alkaline Phosphatase 88 40 - 150 U/L    AST 17 10 - 40 U/L    ALT 18 10 - 44 U/L    eGFR >60 >60 mL/min/1.73 m^2    Anion Gap 12 8 - 16 mmol/L   BNP    Collection Time: 03/17/25  6:47 AM   Result Value Ref Range    BNP 12 0 - 99 pg/mL   CK    Collection Time: 03/17/25  6:47 AM   Result Value Ref Range    CPK 46 20 - 200 U/L   Troponin I    Collection Time: 03/17/25  6:47 AM   Result Value Ref Range    Troponin I 0.017 0.000 - 0.026 ng/mL   Hepatitis C Antibody    Collection Time: 03/17/25  6:47 AM   Result Value Ref Range    Hepatitis C Ab Negative Negative   HCV Virus Hold Specimen    Collection Time: 03/17/25  6:47 AM   Result Value Ref Range    HEP C Virus Hold Specimen Hold for HCV sendout    HIV 1/2 Ag/Ab (4th Gen)    Collection Time: 03/17/25  6:47 AM   Result Value Ref Range    HIV  1/2 Ag/Ab Negative Negative   Urinalysis, Reflex to Urine Culture Urine, Clean Catch    Collection Time: 03/17/25  7:26 AM    Specimen: Urine   Result Value Ref Range    Specimen UA Urine, Clean Catch     Color, UA Brown (A) Yellow, Straw, Flaca    Appearance, UA Cloudy (A) Clear    pH, UA 7.0 5.0 - 8.0    Specific Gravity, UA 1.010 1.005 - 1.030    Protein, UA 2+ (A) Negative    Glucose, UA Negative Negative    Ketones, UA Negative Negative    Bilirubin (UA) Negative Negative    Occult Blood UA 3+ (A) Negative    Nitrite, UA Negative Negative    Urobilinogen, UA Negative <2.0 EU/dL    Leukocytes, UA 1+ (A) Negative   Urinalysis Microscopic    Collection Time: 03/17/25  7:26 AM   Result Value Ref Range    RBC, UA >100 (H) 0 - 4 /hpf    WBC, UA 3 0 - 5 /hpf    Bacteria Rare None-Occ /hpf    Hyaline Casts, UA 0 0-1/lpf /lpf    Microscopic Comment SEE COMMENT          Imaging Results:  Imaging Results              MRI Lumbar Spine W WO Cont (Final result)  Result time 03/17/25 12:25:06      Final result by Nilesh Tamez MD (03/17/25 12:25:06)                   Impression:      Large mass of the lower pole of the right kidney.  No evidence of lumbar metastatic disease.  Degenerative disc disease notably at L3-4, L4-5 and L5-S1.  L5 pars defects with grade 1 L5-S1 spondylolisthesis with moderate to severe foraminal stenosis.      Electronically signed by: Nilesh Tamez MD  Date:    03/17/2025  Time:    12:25               Narrative:    EXAMINATION:  MRI LUMBAR SPINE W WO CONTRAST    CLINICAL HISTORY:  Low back pain, cancer suspected;    TECHNIQUE:  Standard multiplanar without and with contrast MRI sequences of the lumbar spine performed with 6cc Gadavist.    COMPARISON:  CT scan 03/17/2025.    FINDINGS:  There is a large mass of the lower pole of the right kidney.  There is a simple cyst at the lower pole of the left kidney.    The distal cord and conus appear intrinsically normal.  The cauda equina appears  normal    There are bilateral L5 pars defects with 5 mm L5-S1 spondylolisthesis.    The lumbar vertebra are normal in signal except for small endplate Schmorl's nodes on the left at L2-3.    No evidence of metastatic disease or leptomeningeal disease.    T12-L1: Unremarkable.    L1-2:    Minor disc bulge and facet arthrosis.    L2-3:    Mild disc degeneration with disc bulge and mild facet arthrosis.    L3-4:    Mild to moderate disc degeneration with disc narrowing, desiccation and disc bulge.  Mild facet arthrosis.  Mild central stenosis.    L4-5:    Mild disc degeneration with mild disc bulge.  Mild facet arthrosis.    L5-S1:   Mild disc degeneration with disc narrowing, desiccation and disc bulge.  Pars defects with spondylolisthesis.  Facet arthrosis.  Moderate to severe foraminal stenosis left worse than right.                                       CT Lumbar Spine Without Contrast (Final result)  Result time 03/17/25 08:46:15      Final result by Nilesh Tamez MD (03/17/25 08:46:15)                   Impression:      Large right renal mass with pulmonary metastatic disease.  No CT evidence of lumbar metastatic disease.    Multilevel lumbar degenerative disc disease with L3-4 and L4-5 central canal stenosis.  L5 pars defects with grade 1 spondylolisthesis with moderate to severe L5-S1 foraminal stenosis.    All CT scans at this facility are performed  using dose modulation techniques as appropriate to performed exam including the following:  automated exposure control; adjustment of mA and/or kV according to the patients size (this includes techniques or standardized protocols for targeted exams where dose is matched to indication/reason for exam: i.e. extremities or head);  iterative reconstruction technique.      Electronically signed by: Nilesh Tamez MD  Date:    03/17/2025  Time:    08:46               Narrative:    EXAMINATION:  CT LUMBAR SPINE WITHOUT CONTRAST    CLINICAL HISTORY:  Low back  pain, progressive neurologic deficit;Weakness (780.79);    TECHNIQUE:  Standard noncontrast CT scan of the lumbar spine.  Limited by patient motion.    COMPARISON:  X-ray 02/11/2025.  Chest CT 02/27/2025    FINDINGS:  There are several lung mass is evident in the lung bases consistent with previous chest CT demonstrating pulmonary metastatic disease.  There is also a large right renal mass.    The abdominal aorta is densely calcified.  There is no gross lytic or destructive mass of the lumbar spine evident on this exam.    There are bilateral L5 pars defects with grade 1 L5-S1 spondylolisthesis.    T12-L1: Mild disc bulge and mild facet arthrosis.    L1-2: Mild disc bulge and mild facet arthrosis.    L2-3: Mild disc bulge with mild facet arthrosis.  Mild central canal stenosis.    L3-4: Moderate disc degeneration with disc bulge and facet arthrosis with moderate central canal stenosis.    L4-5: Mild disc degeneration with disc bulge and facet arthrosis with mild central stenosis.    L5-S1: Bilateral pars defects with grade 1 spondylolisthesis.  Central canal is patent.  Moderate to severe foraminal stenosis is noted.                                       CT Head Without Contrast (Final result)  Result time 03/17/25 08:29:09      Final result by Hernán Wisdom MD (03/17/25 08:29:09)                   Impression:      Chronic microvascular ischemic changes.    All CT scans at this facility use dose modulation, iterative reconstruction, and/or weight based dosing when appropriate to reduce radiation dose to as low as reasonable achievable.      Electronically signed by: Hernán Wisdom MD  Date:    03/17/2025  Time:    08:29               Narrative:    EXAMINATION:  CT HEAD WITHOUT CONTRAST    CLINICAL HISTORY:  Head trauma, minor (Age >= 65y);    TECHNIQUE:  Low dose axial CT images obtained throughout the head without intravenous contrast. Sagittal and coronal reconstructions were  performed.    COMPARISON:  None.    FINDINGS:  Intracranial compartment:    The brain parenchyma demonstrates areas of decreased attenuation with mild to moderate periventricular white matter consistent with chronic microvascular ischemic changes..  No parenchymal mass, hemorrhage, edema or major vascular distribution infarct.  Vascular calcifications are noted.    Mild prominence of the sulci and ventricles are consistent with age-related involutional changes.    No extra-axial blood or fluid collections.    Skull/extracranial contents (limited evaluation): No fracture. Mastoid air cells and paranasal sinuses are essentially clear.                                       X-Ray Chest AP Portable (Final result)  Result time 03/17/25 07:59:18      Final result by Hernán Wisdom MD (03/17/25 07:59:18)                   Impression:      No acute process seen.      Electronically signed by: Hernán Wisdom MD  Date:    03/17/2025  Time:    07:59               Narrative:    EXAMINATION:  XR CHEST AP PORTABLE    CLINICAL HISTORY:  weakness;    FINDINGS:  Single view of the chest.  Comparison 02/27/2025    Cardiac silhouette is normal.  The lungs demonstrate no evidence of active disease.  No evidence of pleural effusion or pneumothorax.  Bones appear intact.  Moderate degenerative changes and moderate atherosclerotic disease.                                       X-Ray Pelvis Routine AP (Final result)  Result time 03/17/25 08:08:44      Final result by Hernán Wisdom MD (03/17/25 08:08:44)                   Impression:      No acute fracture or dislocation.      Electronically signed by: Hernán Wisdom MD  Date:    03/17/2025  Time:    08:08               Narrative:    EXAMINATION:  XR PELVIS ROUTINE AP    CLINICAL HISTORY:  XR PELVIS ROUTINE AP    COMPARISON:  None    FINDINGS:  Single views of the pelvis were obtained.    No evidence of acute fracture or dislocation.  Bony mineralization is normal.  Soft tissues are  "unremarkable.   Scattered degenerative change.  Dense atherosclerotic disease.                                       The EKG was ordered, reviewed, and independently interpreted by the ED provider.  Interpretation time: 06:37  Rate: 93 BPM  Rhythm: normal sinus rhythm  Interpretation: Minimal voltage criteria for LVH, may be normal variant (R in aVL)Inferior infarct. Possible Anterior infarct. No STEMI.    The Emergency Provider reviewed the vital signs and test results, which are outlined above.     ED Discussion       ED Course as of 03/17/25 1313   Mon Mar 17, 2025   1240 Calcium(!!): 14.8 [NF]      ED Course User Index  [NF] Nura Yi MD     Medical Decision Making  73 y/o M with history of renal cell carcinoma. weakness since January. with multiple falls and worsening weakness the last few days.  states his "legs aren't working right"  Ct head negative.  CT and MRI of the lumbar spine show DDD, but nothing that appears to explain the weakness or mets to the bone.  Calcium is 14.8. Wife states she can't help him up or get him around. Consulted  for admission.    Amount and/or Complexity of Data Reviewed  Independent Historian: EMS     Details: EMS provided report on arrival  Labs: ordered. Decision-making details documented in ED Course.  Radiology: ordered. Decision-making details documented in ED Course.  ECG/medicine tests: ordered and independent interpretation performed. Decision-making details documented in ED Course.  Discussion of management or test interpretation with external provider(s):     12:50 PM: Discussed case with AMEENA Ferrari ([facility]). Dr. Clark agrees with current care and management of pt and accepts admission.   Admitting Service: [facility]  Admitting Physician: Dr. Clark  Admit to: obs med tele    12:50 PM: Re-evaluated pt. I have discussed test results, shared treatment plan, and the need for admission with patient and family at bedside. Pt and family " express understanding at this time and agree with all information. All questions answered. Pt and family have no further questions or concerns at this time. Pt is ready for admit.    Risk  Prescription drug management.                 ED Medication(s):  Medications   0.9% NaCl infusion (has no administration in time range)   sodium chloride 0.9% bolus 500 mL 500 mL (has no administration in time range)   zoledronic acid (ZOMETA) 4 mg in 0.9% NaCl 100 mL IVPB (has no administration in time range)   lactulose 20 gram/30 mL solution Soln 20 g (has no administration in time range)   sodium chloride 0.9% flush 10 mL (has no administration in time range)   enoxaparin injection 40 mg (has no administration in time range)   melatonin tablet 6 mg (has no administration in time range)   ondansetron disintegrating tablet 8 mg (has no administration in time range)   promethazine tablet 25 mg (has no administration in time range)   acetaminophen tablet 650 mg (has no administration in time range)   ketorolac injection 15 mg (has no administration in time range)   naloxone 0.4 mg/mL injection 0.02 mg (has no administration in time range)   glucose chewable tablet 16 g (has no administration in time range)   glucose chewable tablet 24 g (has no administration in time range)   dextrose 50% injection 12.5 g (has no administration in time range)   dextrose 50% injection 25 g (has no administration in time range)   glucagon (human recombinant) injection 1 mg (has no administration in time range)   amLODIPine tablet 10 mg (has no administration in time range)   valsartan tablet 320 mg (has no administration in time range)   morphine injection 4 mg (4 mg Intravenous Given 3/17/25 0736)   sodium chloride 0.9% bolus 1,000 mL 1,000 mL (0 mLs Intravenous Stopped 3/17/25 0836)   sodium chloride 0.9% bolus 1,000 mL 1,000 mL (0 mLs Intravenous Stopped 3/17/25 0839)   diphenhydrAMINE injection 25 mg (25 mg Intravenous Given 3/17/25 1111)    gadobutroL (GADAVIST) injection 10 mL (6 mLs Intravenous Given 3/17/25 1131)       New Prescriptions    No medications on file               Scribe Attestation:   Scribe #1: I performed the above scribed service and the documentation accurately describes the services I performed. I attest to the accuracy of the note.     Attending:   Physician Attestation Statement for Scribe #1: I, Nura Yi MD, personally performed the services described in this documentation, as scribed by Emmanuel Hines, in my presence, and it is both accurate and complete.           Clinical Impression       ICD-10-CM ICD-9-CM   1. Hypercalcemia  E83.52 275.42   2. Weakness  R53.1 780.79   3. Chest pain  R07.9 786.50       Disposition:   Disposition: Placed in Observation  Condition: Nura Lin MD  03/17/25 9023

## 2025-03-17 NOTE — ASSESSMENT & PLAN NOTE
Patient's blood pressure range in the last 24 hours was: BP  Min: 136/70  Max: 168/74.The patient's inpatient anti-hypertensive regimen is listed below:  Current Antihypertensives  amLODIPine tablet 10 mg, Nightly, Oral  valsartan tablet 320 mg, Nightly, Oral    Plan  - BP is uncontrolled, will adjust as follows: mildly elevated, receiving fluids, home medication(s) resumed  - intravenous prn hydralazine

## 2025-03-17 NOTE — ASSESSMENT & PLAN NOTE
Hypercalcemia is likely due to Malignancy. The patient has the following symptoms due to their hypercalcemia: weakness. Their most recent calcium and albumin results are listed below.  Recent Labs     03/17/25  0647   CALCIUM 14.8*   ALBUMIN 2.8*     Plan  - Obtain the following labs to work up the cause of hypercalcemia: PTH   PTH, Intact   Date Value Ref Range Status   03/17/2025 5.1 (L) 9.0 - 77.0 pg/mL Final    .   - Treat the hypercalcemia with: IV fluids ordered at a rate of 125 ml/hr and bolus and zometa .   - The patient's hypercalcemia is  stable .   - repeat calcium in am  Hem/onc consulted   Speech consulted

## 2025-03-17 NOTE — ASSESSMENT & PLAN NOTE
Corrected Ca 15.8 on admit  Cont aggressive  IVF   S/p  zometa in ED on 3/17/25   Plan calcitonin  - Send PTH, PTHrp, calcitriol, vitamin d now  - monitor daily cmp

## 2025-03-17 NOTE — ASSESSMENT & PLAN NOTE
Followed by Oncologist, Dr. Contreras  By imaging most consistent with a right sided RCC with associated lung metastases. Complicated by leg weakness and hypercalcemia.  Staging workup planned  including Brain MRI and Tc99m bone scan to complete staging  Plan Brain MRI during hospitalization when pt more stable   -  kidney biopsy planned 3/18/25( consider inpatient biopsy)  - Presuming RCC confirmed,  upfront sarita/pem to maximize SHEFFIELD planned   - Outpatient FU with Dr. Contreras planned to  review workup  and confirm treatment plans

## 2025-03-17 NOTE — CONSULTS
[facility] Surg  Hematology/Oncology  Consult Note    Patient Name: Neymar Victoria  MRN: [id number]  Admission Date: 3/17/2025  Hospital Length of Stay: 0 days  Code Status: DNR   Attending Provider: Manuelito Clark MD  Consulting Provider: Antonia Segovia MD  Primary Care Physician: Susan Chávez MD  Principal Problem:Hypercalcemia of malignancy    Inpatient consult to [facility] Oncology  Consult performed by: Antonia Segovia MD  Consult ordered by: Antonia Segovia MD        Subjective:     HPI:  74M  has a past medical history of Arthritis,Hyperlipidemia  Hypertension, right sided kidney tumor with associated pulmonary metastases 02/2025 presents to emergency department for generalized weakness and falls and AMS. .  . Family at bedside and provides history as patient has AMS. Pt has had wt loss and  progressive weakness over the last 2 months.Appetite is diminished and  Has lost about 30 lbs over the last two months. No CP, SOB or cough. No headaches.He is followed by Oncologist, Dr. Contreras with plan for outpatient staging workup. Workup in ED includes  lumbar spine without contrast showing large right renal mass with pulmonary mets but no bony mets and L5-S1 foraminal stenosis. Labs on presentation shows Corrected Ca level 15.8mg/dL Consulted for hypercalcemia of malignancy.     Pt has received IVF hydration and Zometa IV 4mg .     VIRTUAL TELENOTE    Start time:5:35pm  Chief complaint: hypercalcemia   The patient location is: [facility]  The patient arrived at: 5:35pm  Present with the patient at the time of the telemed/virtual assessment:family         End time:  5:55pm    Total time spent with patient: 20 min  Total time spent during visit : 45 min    The attending portion of this evaluation, treatment, and documentation was performed per Antonia Segovia MD via AudioVisual Assisted with secure video- assisted technology with [employer] software platform. Both the provider and the patient were  located in the hospital and utilized 2 way audio/video to document history and physical exam. .             Oncology Treatment Plan:   OP LENVATINIB 20 MG DAILY + PEMBROLIZUMAB 400 MG Q6W    Medications:  Continuous Infusions:   0.9% NaCl   Intravenous Continuous 125 mL/hr at 03/17/25 1556 New Bag at 03/17/25 1556     Scheduled Meds:   amLODIPine  10 mg Oral QHS    calcitonin  4 Units/kg Subcutaneous Q12H    enoxparin  40 mg Subcutaneous Daily    lactulose  20 g Oral Daily    LIDOcaine  1 patch Transdermal Q24H    [START ON 3/18/2025] nicotine  1 patch Transdermal Daily    potassium chloride  40 mEq Oral BID    traMADoL  50 mg Oral Q6H WAKE    valsartan  320 mg Oral QHS     PRN Meds:  Current Facility-Administered Medications:     acetaminophen, 650 mg, Oral, Q4H PRN    dextrose 50%, 12.5 g, Intravenous, PRN    dextrose 50%, 25 g, Intravenous, PRN    glucagon (human recombinant), 1 mg, Intramuscular, PRN    glucose, 16 g, Oral, PRN    glucose, 24 g, Oral, PRN    hydrALAZINE, 10 mg, Intravenous, Q6H PRN    ketorolac, 15 mg, Intravenous, Q6H PRN    melatonin, 6 mg, Oral, Nightly PRN    morphine, 1 mg, Intravenous, Q12H PRN    naloxone, 0.02 mg, Intravenous, PRN    ondansetron, 8 mg, Oral, Q8H PRN    oxyCODONE, 5 mg, Oral, Q6H PRN    promethazine, 25 mg, Oral, Q6H PRN    sodium chloride 0.9%, 10 mL, Intravenous, Q8H PRN     Review of patient's allergies indicates:   Allergen Reactions    Statins-hmg-coa reductase inhibitors      Myalgia        Past Medical History:   Diagnosis Date    Arthritis     Early dry stage nonexudative age-related macular degeneration of both eyes 9/28/2020    Erectile dysfunction     Hyperlipidemia LDL goal < 160     Hypertension     Seasonal allergies      Past Surgical History:   Procedure Laterality Date    CATARACT EXTRACTION W/ INTRAOCULAR LENS IMPLANT Right 06/10/2021    COLONOSCOPY N/A 02/29/2016    Procedure: COLONOSCOPY;  Surgeon: Aruna Rocha MD;  Location: [facility];   Service: Endoscopy;  Laterality: N/A;    COLONOSCOPY N/A 2019    Procedure: COLONOSCOPY;  Surgeon: Jacobo Sigala MD;  Location: [facility] ENDO;  Service: Endoscopy;  Laterality: N/A;    COLONOSCOPY N/A 2022    Procedure: COLONOSCOPY;  Surgeon: Jacobo Sigala MD;  Location: [facility] ENDO;  Service: General;  Laterality: N/A;    WISDOM TOOTH EXTRACTION       Family History       Problem Relation (Age of Onset)    Arthritis Mother    Cancer Father    Hypertension Other    Lymphoma Mother    Macular degeneration Mother    Melanoma Father    Stroke Paternal Grandfather    Vision loss Mother          Tobacco Use    Smoking status: Former     Current packs/day: 0.00     Average packs/day: 0.5 packs/day for 60.0 years (30.0 ttl pk-yrs)     Types: Cigarettes     Start date:      Quit date: 2025     Years since quittin.2    Smokeless tobacco: Never   Substance and Sexual Activity    Alcohol use: Not Currently     Comment: social use of alchol    Drug use: No    Sexual activity: Not Currently     Partners: Female     Birth control/protection: None       Review of Systems   Unable to perform ROS: Mental status change     Objective:     Vital Signs (Most Recent):  Temp: 97.4 °F (36.3 °C) (25)  Pulse: 78 (25)  Resp: 16 (25)  BP: (!) 154/70 (25)  SpO2: 95 % (25) Vital Signs (24h Range):  Temp:  [97.4 °F (36.3 °C)-98.4 °F (36.9 °C)] 97.4 °F (36.3 °C)  Pulse:  [68-98] 78  Resp:  [15-20] 16  SpO2:  [89 %-97 %] 95 %  BP: (136-168)/(66-82) 154/70     Weight: 67.1 kg (147 lb 14.9 oz)  Body mass index is 23.88 kg/m².  Body surface area is 1.77 meters squared.      Intake/Output Summary (Last 24 hours) at 3/17/2025 1809  Last data filed at 3/17/2025 1453  Gross per 24 hour   Intake 1998 ml   Output 200 ml   Net 1798 ml        Physical Exam     Significant Labs:   All pertinent labs from the last 24 hours have been reviewed.    Diagnostic Results:  I have  reviewed and interpreted all pertinent imaging results/findings within the past 24 hours.  Assessment/Plan:     * Hypercalcemia of malignancy  Corrected Ca 15.8 on admit  Cont aggressive  IVF   S/p  zometa in ED on 3/17/25   Plan calcitonin  - Send PTH, PTHrp, calcitriol, vitamin d now  - monitor daily cmp         Renal mass with pulmonary metastasis  Followed by Oncologist, Dr. Contreras  By imaging most consistent with a right sided RCC with associated lung metastases. Complicated by leg weakness and hypercalcemia.  Staging workup planned  including Brain MRI and Tc99m bone scan to complete staging  Plan Brain MRI during hospitalization when pt more stable   -  kidney biopsy planned 3/18/25( consider inpatient biopsy)  - Presuming RCC confirmed,  upfront sarita/pem to maximize SHEFFIELD planned   - Outpatient FU with Dr. Contreras planned to  review workup  and confirm treatment plans        Thank you for your consult. I will follow-up with patient. Please contact us if you have any additional questions.    Antonia Segovia MD  Hematology/Oncology  [facility]

## 2025-03-17 NOTE — ACP (ADVANCE CARE PLANNING)
Code Status  In light of the patients advanced and life limiting illness,I engaged the the patient in a voluntary conversation about the patient's preferences for care  at the very end of life. The patient wishes to have a natural, peaceful death.  Along those lines, the patient does not wish to have CPR or other invasive treatments performed when his heart and/or breathing stops. I communicated to the patient that a DNR order would be placed in his medical record to reflect this preference.    A total of 21 min was spent on advance care planning, goals of care discussion, emotional support, formulating and communicating prognosis and exploring burden/benefit of various approaches of treatment. This discussion occurred on a fully voluntary basis with the verbal consent of the patient and/or family.

## 2025-03-17 NOTE — PHARMACY MED REC
"Admission Medication History     The home medication history was taken by Aubrey Cardona.    You may go to "Admission" then "Reconcile Home Medications" tabs to review and/or act upon these items.     The home medication list has been updated by the Pharmacy department.   Please read ALL comments highlighted in yellow.   Please address this information as you see fit.    Feel free to contact us if you have any questions or require assistance.      Medications listed below were obtained from: Patient/family and Analytic software- [employer]  (Not in a hospital admission)      Aubrye Cardona  [id number]    Current Outpatient Medications on File Prior to Encounter   Medication Sig Dispense Refill Last Dose/Taking    amlodipine-valsartan (EXFORGE)  mg per tablet Take 1 tablet by mouth every evening. 90 tablet 1 3/16/2025    lactulose (CHRONULAC) 20 gram/30 mL Soln Take 30 mLs (20 g total) by mouth once daily. 420 mL 2 3/16/2025    metoprolol succinate (TOPROL-XL) 200 MG 24 hr tablet Take 1 tablet (200 mg total) by mouth every evening. 90 tablet 3 3/16/2025    traMADoL (ULTRAM) 50 mg tablet Take 1 tablet (50 mg total) by mouth every 6 (six) hours as needed for Pain. 60 each 5 3/17/2025    bisacodyL (DULCOLAX, BISACODYL,) 5 mg EC tablet Take 1 tablet (5 mg total) by mouth daily as needed for Constipation. 30 tablet 1     cyanocobalamin (VITAMIN B-12) 1000 MCG tablet Take 1 tablet (1,000 mcg total) by mouth once daily.       lenvatinib (LENVIMA) 20 mg/day (10 mg x 2) Cap Take 20 mg by mouth once daily. 60 capsule 11     ondansetron (ZOFRAN-ODT) 8 MG TbDL Take 1 tablet (8 mg total) by mouth every 8 (eight) hours as needed (nausea). Take 1 tablet (8 mg) by mouth 30 minutes prior to oral chemotherapy and every 8 hours as needed for nausea/vomiting (MAX = 3 tablets in 24 hours). 60 tablet 5     polyethylene glycol (MIRALAX) 17 gram/dose powder Take 17 g by mouth 2 (two) times daily. 510 g 0     PROPYLENE GLYCOL/PEG " 400 (SYSTANE OPHT) Apply to eye.       senna-docusate 8.6-50 mg (SENNA WITH DOCUSATE SODIUM) 8.6-50 mg per tablet Take 1 tablet by mouth once daily. 60 tablet 0     tadalafiL (CIALIS) 10 MG tablet 1 tablet at least 30 minutes prior to sexual activity; not more than once daily 10 tablet 1                        .

## 2025-03-18 LAB
ALBUMIN SERPL BCP-MCNC: 2.6 G/DL (ref 3.5–5.2)
ALBUMIN SERPL BCP-MCNC: 2.6 G/DL (ref 3.5–5.2)
ALP SERPL-CCNC: 92 U/L (ref 40–150)
ALP SERPL-CCNC: 92 U/L (ref 40–150)
ALT SERPL W/O P-5'-P-CCNC: 18 U/L (ref 10–44)
ALT SERPL W/O P-5'-P-CCNC: 18 U/L (ref 10–44)
ANION GAP SERPL CALC-SCNC: 12 MMOL/L (ref 8–16)
ANION GAP SERPL CALC-SCNC: 13 MMOL/L (ref 8–16)
AST SERPL-CCNC: 28 U/L (ref 10–40)
AST SERPL-CCNC: 29 U/L (ref 10–40)
BILIRUB SERPL-MCNC: 0.5 MG/DL (ref 0.1–1)
BILIRUB SERPL-MCNC: 0.5 MG/DL (ref 0.1–1)
BUN SERPL-MCNC: 11 MG/DL (ref 8–23)
BUN SERPL-MCNC: 12 MG/DL (ref 8–23)
CALCIUM SERPL-MCNC: 11 MG/DL (ref 8.7–10.5)
CALCIUM SERPL-MCNC: 11.4 MG/DL (ref 8.7–10.5)
CHLORIDE SERPL-SCNC: 101 MMOL/L (ref 95–110)
CHLORIDE SERPL-SCNC: 102 MMOL/L (ref 95–110)
CO2 SERPL-SCNC: 22 MMOL/L (ref 23–29)
CO2 SERPL-SCNC: 24 MMOL/L (ref 23–29)
CREAT SERPL-MCNC: 0.8 MG/DL (ref 0.5–1.4)
CREAT SERPL-MCNC: 1 MG/DL (ref 0.5–1.4)
EST. GFR  (NO RACE VARIABLE): >60 ML/MIN/1.73 M^2
EST. GFR  (NO RACE VARIABLE): >60 ML/MIN/1.73 M^2
GLUCOSE SERPL-MCNC: 79 MG/DL (ref 70–110)
GLUCOSE SERPL-MCNC: 97 MG/DL (ref 70–110)
MAGNESIUM SERPL-MCNC: 1.7 MG/DL (ref 1.6–2.6)
POTASSIUM SERPL-SCNC: 2.9 MMOL/L (ref 3.5–5.1)
POTASSIUM SERPL-SCNC: 3.6 MMOL/L (ref 3.5–5.1)
PROT SERPL-MCNC: 5.5 G/DL (ref 6–8.4)
PROT SERPL-MCNC: 6.6 G/DL (ref 6–8.4)
SODIUM SERPL-SCNC: 136 MMOL/L (ref 136–145)
SODIUM SERPL-SCNC: 138 MMOL/L (ref 136–145)

## 2025-03-18 PROCEDURE — 21400001 HC TELEMETRY ROOM

## 2025-03-18 PROCEDURE — 63600175 PHARM REV CODE 636 W HCPCS: Performed by: HOSPITALIST

## 2025-03-18 PROCEDURE — 25500020 PHARM REV CODE 255: Performed by: FAMILY MEDICINE

## 2025-03-18 PROCEDURE — 80053 COMPREHEN METABOLIC PANEL: CPT | Mod: 91 | Performed by: FAMILY MEDICINE

## 2025-03-18 PROCEDURE — 99223 1ST HOSP IP/OBS HIGH 75: CPT | Mod: ,,, | Performed by: UROLOGY

## 2025-03-18 PROCEDURE — 97166 OT EVAL MOD COMPLEX 45 MIN: CPT

## 2025-03-18 PROCEDURE — 63600175 PHARM REV CODE 636 W HCPCS: Performed by: FAMILY MEDICINE

## 2025-03-18 PROCEDURE — 97530 THERAPEUTIC ACTIVITIES: CPT

## 2025-03-18 PROCEDURE — A9585 GADOBUTROL INJECTION: HCPCS | Performed by: FAMILY MEDICINE

## 2025-03-18 PROCEDURE — 11000001 HC ACUTE MED/SURG PRIVATE ROOM

## 2025-03-18 PROCEDURE — 82397 CHEMILUMINESCENT ASSAY: CPT | Performed by: FAMILY MEDICINE

## 2025-03-18 PROCEDURE — 97116 GAIT TRAINING THERAPY: CPT

## 2025-03-18 PROCEDURE — 25000003 PHARM REV CODE 250: Performed by: FAMILY MEDICINE

## 2025-03-18 PROCEDURE — 97163 PT EVAL HIGH COMPLEX 45 MIN: CPT

## 2025-03-18 PROCEDURE — 83735 ASSAY OF MAGNESIUM: CPT | Performed by: FAMILY MEDICINE

## 2025-03-18 PROCEDURE — 36415 COLL VENOUS BLD VENIPUNCTURE: CPT | Performed by: FAMILY MEDICINE

## 2025-03-18 RX ORDER — POTASSIUM CHLORIDE 7.45 MG/ML
10 INJECTION INTRAVENOUS
Status: COMPLETED | OUTPATIENT
Start: 2025-03-18 | End: 2025-03-18

## 2025-03-18 RX ORDER — LANOLIN ALCOHOL/MO/W.PET/CERES
400 CREAM (GRAM) TOPICAL 2 TIMES DAILY
Status: DISCONTINUED | OUTPATIENT
Start: 2025-03-18 | End: 2025-03-20 | Stop reason: HOSPADM

## 2025-03-18 RX ORDER — GADOBUTROL 604.72 MG/ML
10 INJECTION INTRAVENOUS
Status: COMPLETED | OUTPATIENT
Start: 2025-03-18 | End: 2025-03-18

## 2025-03-18 RX ORDER — FUROSEMIDE 10 MG/ML
20 INJECTION INTRAMUSCULAR; INTRAVENOUS EVERY 12 HOURS
Status: DISCONTINUED | OUTPATIENT
Start: 2025-03-19 | End: 2025-03-20 | Stop reason: HOSPADM

## 2025-03-18 RX ORDER — FUROSEMIDE 10 MG/ML
20 INJECTION INTRAMUSCULAR; INTRAVENOUS EVERY 12 HOURS
Status: DISCONTINUED | OUTPATIENT
Start: 2025-03-18 | End: 2025-03-18

## 2025-03-18 RX ORDER — LORAZEPAM 0.5 MG/1
0.5 TABLET ORAL ONCE
Status: COMPLETED | OUTPATIENT
Start: 2025-03-18 | End: 2025-03-18

## 2025-03-18 RX ADMIN — Medication 400 MG: at 08:03

## 2025-03-18 RX ADMIN — LORAZEPAM 0.5 MG: 2 INJECTION INTRAMUSCULAR; INTRAVENOUS at 02:03

## 2025-03-18 RX ADMIN — SODIUM CHLORIDE 500 ML: 0.9 INJECTION, SOLUTION INTRAVENOUS at 10:03

## 2025-03-18 RX ADMIN — SODIUM CHLORIDE: 9 INJECTION, SOLUTION INTRAVENOUS at 07:03

## 2025-03-18 RX ADMIN — GADOBUTROL 6 ML: 604.72 INJECTION INTRAVENOUS at 04:03

## 2025-03-18 RX ADMIN — TRAMADOL HYDROCHLORIDE 50 MG: 50 TABLET, COATED ORAL at 08:03

## 2025-03-18 RX ADMIN — POTASSIUM CHLORIDE 40 MEQ: 1500 TABLET, EXTENDED RELEASE ORAL at 08:03

## 2025-03-18 RX ADMIN — POTASSIUM CHLORIDE 10 MEQ: 7.46 INJECTION, SOLUTION INTRAVENOUS at 10:03

## 2025-03-18 RX ADMIN — SODIUM CHLORIDE: 9 INJECTION, SOLUTION INTRAVENOUS at 06:03

## 2025-03-18 RX ADMIN — POTASSIUM CHLORIDE 10 MEQ: 7.46 INJECTION, SOLUTION INTRAVENOUS at 09:03

## 2025-03-18 RX ADMIN — TRAMADOL HYDROCHLORIDE 50 MG: 50 TABLET, COATED ORAL at 02:03

## 2025-03-18 RX ADMIN — OXYCODONE HYDROCHLORIDE 5 MG: 5 TABLET ORAL at 05:03

## 2025-03-18 RX ADMIN — LORAZEPAM 0.5 MG: 0.5 TABLET ORAL at 03:03

## 2025-03-18 RX ADMIN — AMLODIPINE BESYLATE 10 MG: 10 TABLET ORAL at 08:03

## 2025-03-18 RX ADMIN — CEFTRIAXONE 1 G: 1 INJECTION, POWDER, FOR SOLUTION INTRAMUSCULAR; INTRAVENOUS at 01:03

## 2025-03-18 RX ADMIN — KETOROLAC TROMETHAMINE 15 MG: 30 INJECTION, SOLUTION INTRAMUSCULAR at 02:03

## 2025-03-18 RX ADMIN — POTASSIUM CHLORIDE 10 MEQ: 7.46 INJECTION, SOLUTION INTRAVENOUS at 01:03

## 2025-03-18 RX ADMIN — LIDOCAINE 1 PATCH: 50 PATCH TOPICAL at 05:03

## 2025-03-18 RX ADMIN — VALSARTAN 320 MG: 160 TABLET, FILM COATED ORAL at 08:03

## 2025-03-18 RX ADMIN — POTASSIUM CHLORIDE 10 MEQ: 7.46 INJECTION, SOLUTION INTRAVENOUS at 12:03

## 2025-03-18 RX ADMIN — CEFTRIAXONE 1 G: 1 INJECTION, POWDER, FOR SOLUTION INTRAMUSCULAR; INTRAVENOUS at 11:03

## 2025-03-18 NOTE — ASSESSMENT & PLAN NOTE
Likely due to renal cell mass  Us retroperitoneal concerning for hyperechoic mass in bladder wall  Post void bladder scan showing >300mL  Urology aware

## 2025-03-18 NOTE — PROGRESS NOTES
[facility] Medicine  Progress Note    Patient Name: Neymar Victoria  MRN: [id number]  Patient Class: IP- Inpatient   Admission Date: 3/17/2025  Length of Stay: 0 days  Attending Physician: Manuelito Clark MD  Primary Care Provider: Susan Chávez MD        Subjective     Principal Problem:Hypercalcemia of malignancy        HPI:  74M  has a past medical history of Arthritis, Early dry stage nonexudative age-related macular degeneration of both eyes, Erectile dysfunction, Hyperlipidemia LDL goal < 160, Hypertension, and Seasonal allergies.  Presents to emergency department for generalized weakness and falls. Associated with back pain and weight loss. Recently seen in hem/onc for renal cell carcinoma with pulmonary mets. Denies bladder or bowel incontinence. Neighbor present and endorses improvement in mentation since being evaluated in emergency department. Does not wear supplemental oxygen at baseline.     Initial workup in emergency department reveals hypertension, hypoxia on room air, improved with nasal cannula. Afebrile. Cbc with normocytic anemia. Cmp showing mild hyponatremia, hypokalemia and hypercalcemia. Bnp, ck, and troponin(s) within normal limits. Pth 5.1 urinalysis with hematuria. Ct head without contrast with chronic microvascular ischemic findings. Ct lumbar spine without contrast showing large right renal mass with pulmonary mets but no bony mets and L5-S1 foraminal stenosis. Chest x-ray with no active disease. Xray pelvis with on fracture. Mri lumbar spine with and without contrast showing same as ct lumbar spine. Us thyroid with no definite parathyroid tissue.    Hospital medicine consulted for hypercalcemia of malignancy. Hem/onc consulted.    Overview/Hospital Course:  3/18 admitted for generalized weakness, falls, confusion related to hypercalcemia of malignancy. Plans for mri brain and renal biopsy per urology and oncology. Interventional radiology aware and plans for tomorrow.  Physical/occupational therapy recommending rehab.     Interval History: See hospital course for today      Review of Systems   Respiratory:  Negative for shortness of breath.    Allergic/Immunologic: Positive for immunocompromised state.   Neurological:  Positive for weakness.   Psychiatric/Behavioral:  Positive for agitation, confusion and decreased concentration.      Objective:     Vital Signs (Most Recent):  Temp: 97.2 °F (36.2 °C) (03/18/25 1123)  Pulse: 89 (03/18/25 1123)  Resp: 16 (03/18/25 1408)  BP: (!) 165/78 (03/18/25 1123)  SpO2: (!) 94 % (03/18/25 1123) Vital Signs (24h Range):  Temp:  [97.2 °F (36.2 °C)-98.7 °F (37.1 °C)] 97.2 °F (36.2 °C)  Pulse:  [] 89  Resp:  [16-20] 16  SpO2:  [92 %-95 %] 94 %  BP: (140-174)/(63-78) 165/78     Weight: 67.1 kg (147 lb 14.9 oz)  Body mass index is 23.88 kg/m².    Intake/Output Summary (Last 24 hours) at 3/18/2025 1508  Last data filed at 3/18/2025 0505  Gross per 24 hour   Intake 1486.27 ml   Output --   Net 1486.27 ml         Physical Exam  Vitals and nursing note reviewed. Exam conducted with a chaperone present (family, nursing).   Constitutional:       General: He is not in acute distress.     Appearance: He is ill-appearing. He is not toxic-appearing.   HENT:      Head: Normocephalic and atraumatic.   Cardiovascular:      Rate and Rhythm: Normal rate.   Pulmonary:      Effort: Pulmonary effort is normal. No respiratory distress.   Abdominal:      Palpations: Abdomen is soft.      Tenderness: There is no abdominal tenderness.   Musculoskeletal:      Right lower leg: No edema.      Left lower leg: No edema.   Skin:     General: Skin is warm.      Coloration: Skin is pale.   Neurological:      Mental Status: He is lethargic.      Motor: Weakness present.   Psychiatric:         Behavior: Behavior is slowed.               Significant Labs: All pertinent labs within the past 24 hours have been reviewed.  CBC:   Recent Labs   Lab 03/17/25  0647   WBC 7.48   HGB  10.8*   HCT 33.8*        CMP:   Recent Labs   Lab 03/17/25  0647 03/18/25  0650   * 138   K 3.1* 2.9*   CL 95 102   CO2 28 24   GLU 97 97   BUN 15 12   CREATININE 0.9 0.8   CALCIUM 14.8* 11.0*   PROT 7.1 5.5*   ALBUMIN 2.8* 2.6*   BILITOT 0.6 0.5   ALKPHOS 88 92   AST 17 29   ALT 18 18   ANIONGAP 12 12     Magnesium:   Recent Labs   Lab 03/17/25  0647 03/18/25  0650   MG 2.0 1.7       Significant Imaging: I have reviewed all pertinent imaging results/findings within the past 24 hours.  CT: I have reviewed all pertinent results/findings within the past 24 hours and my personal findings are:  head without contrast showing chronic ischemic changes   U/S: I have reviewed all pertinent results/findings within the past 24 hours and my personal findings are:  thyroid showing no findings; us retroperitoneal with hyperechoic masslike focus in bladder wall  Mri lumbar spine showing severe foraminal stenosis; mri brain pending; xray pelvis showing no fracture       Assessment & Plan  Hypercalcemia of malignancy  Hypercalcemia is likely due to Malignancy. The patient has the following symptoms due to their hypercalcemia: weakness. Their most recent calcium and albumin results are listed below.  Recent Labs     03/17/25  0647 03/18/25  0650   CALCIUM 14.8* 11.0*   ALBUMIN 2.8* 2.6*     Plan  - Obtain the following labs to work up the cause of hypercalcemia: PTH   PTH, Intact   Date Value Ref Range Status   03/17/2025 5.1 (L) 9.0 - 77.0 pg/mL Final    .   - Treat the hypercalcemia with: IV fluids ordered at a rate of 125 ml/hr and bolus and zometa .   - The patient's hypercalcemia is  stable .   - repeat calcium with modest improvement  Hem/onc consulted   Speech consulted   Essential hypertension  Patient's blood pressure range in the last 24 hours was: BP  Min: 140/63  Max: 174/76.The patient's inpatient anti-hypertensive regimen is listed below:  Current Antihypertensives  amLODIPine tablet 10 mg, Nightly,  Oral  valsartan tablet 320 mg, Nightly, Oral  hydrALAZINE injection 10 mg, Every 6 hours PRN, Intravenous    Plan  - BP is uncontrolled, will adjust as follows: mildly elevated, receiving fluids, home medication(s) resumed  - intravenous prn hydralazine  Weakness  Physical/occupational therapy recommending rehab     Renal mass with pulmonary metastasis  Hem/onc and urology consulted   Awaiting renal mass biopsy  Mri brain pending  Physical/occupational therapy recommending rehab  Hematuria  Likely due to renal cell mass  Us retroperitoneal concerning for hyperechoic mass in bladder wall  Post void bladder scan showing >300mL  Urology aware  VTE Risk Mitigation (From admission, onward)           Ordered     enoxaparin injection 40 mg  Daily         03/17/25 1302     IP VTE HIGH RISK PATIENT  Once         03/17/25 1302     Place sequential compression device  Until discontinued         03/17/25 1302                    Discharge Planning   ALKA:      Code Status: DNR   Medical Readiness for Discharge Date:   Discharge Plan A: Rehab                        Manuelito Clark MD  Department of Hospital Medicine   [facility] Med [facility]

## 2025-03-18 NOTE — CONSULTS
Urology Consult    Consulting Physician: Eduardo BRAY    Reason for consultation: Renal mass, hematuria    Chief Complaint:  Hypercalcemia of malignancy    HPI:    Neymar Victoria is a 74 y.o. male who was recently diagnosed with a large right-sided renal mass and numerous pulmonary metastasis.  This is presumably renal cell carcinoma.  He was scheduled for outpatient workup including biopsy and further staging by Dr. Contreras.    In the interim he has had mental status changes.  He was admitted with hypercalcemia.  He has also had gross hematuria.  His wife describes 5 days of significant blood in his urine.  His urine then cleared up and then intermittently turns pink.    He is seen with his wife and his son from [place] at bedside.    Patient is unable to participate in the interview due to altered mental status, sedation.    Past Medical History:   Diagnosis Date    Arthritis     Early dry stage nonexudative age-related macular degeneration of both eyes 9/28/2020    Erectile dysfunction     Hyperlipidemia LDL goal < 160     Hypertension     Seasonal allergies         Past Surgical History:   Procedure Laterality Date    CATARACT EXTRACTION W/ INTRAOCULAR LENS IMPLANT Right 06/10/2021    COLONOSCOPY N/A 02/29/2016    Procedure: COLONOSCOPY;  Surgeon: Aruna Rocha MD;  Location: [facility];  Service: Endoscopy;  Laterality: N/A;    COLONOSCOPY N/A 06/07/2019    Procedure: COLONOSCOPY;  Surgeon: Jacobo Sigala MD;  Location: [facility];  Service: Endoscopy;  Laterality: N/A;    COLONOSCOPY N/A 09/09/2022    Procedure: COLONOSCOPY;  Surgeon: Jacobo Sigala MD;  Location: [facility];  Service: General;  Laterality: N/A;    WISDOM TOOTH EXTRACTION          Social History     Socioeconomic History    Marital status:     Number of children: 2   Occupational History    Occupation: Retired   Tobacco Use    Smoking status: Former     Current packs/day: 0.00     Average packs/day: 0.5 packs/day for  60.0 years (30.0 ttl pk-yrs)     Types: Cigarettes     Start date:      Quit date: 2025     Years since quittin.2    Smokeless tobacco: Never   Substance and Sexual Activity    Alcohol use: Not Currently     Comment: social use of alchol    Drug use: No    Sexual activity: Not Currently     Partners: Female     Birth control/protection: None   Social History Narrative    Lives in [place] with his wife, Alexandra. Retired; formerly worked for the [employer].      Social Drivers of Health     Financial Resource Strain: Patient Declined (3/18/2025)    Overall Financial Resource Strain (CARDIA)     Difficulty of Paying Living Expenses: Patient declined   Food Insecurity: Patient Declined (3/18/2025)    Hunger Vital Sign     Worried About Running Out of Food in the Last Year: Patient declined     Ran Out of Food in the Last Year: Patient declined   Transportation Needs: No Transportation Needs (2024)    PRAPARE - Transportation     Lack of Transportation (Medical): No     Lack of Transportation (Non-Medical): No   Physical Activity: Insufficiently Active (2025)    Exercise Vital Sign     Days of Exercise per Week: 1 day     Minutes of Exercise per Session: 20 min   Stress: Patient Declined (3/18/2025)    [employer] - Occupational Stress Questionnaire     Feeling of Stress : Patient declined   Housing Stability: Patient Declined (3/18/2025)    Housing Stability Vital Sign     Unable to Pay for Housing in the Last Year: Patient declined     Homeless in the Last Year: Patient declined        Family History   Problem Relation Name Age of Onset    Macular degeneration Mother Johan Victoria     Arthritis Mother Johan Victoria     Lymphoma Mother Johan Victoria     Vision loss Mother Johan Victoria     Melanoma Father Nilesh Victoria     Cancer Father Nilesh GODINEZYari Victoria     Stroke Paternal Grandfather Neymar Victoria     Hypertension Other      Psoriasis Neg Hx    "   Lupus Neg Hx      Colon cancer Neg Hx      Uterine cancer Neg Hx      Prostate cancer Neg Hx      Bladder Cancer Neg Hx      Kidney cancer Neg Hx          Review of patient's allergies indicates:   Allergen Reactions    Statins-hmg-coa reductase inhibitors      Myalgia       Medications:      Medications Ordered Prior to Encounter[1]    VITALS (Last 24H):  Temp:  [97.2 °F (36.2 °C)-98.7 °F (37.1 °C)]   Pulse:  []   Resp:  [16-20]   BP: (140-174)/(63-78)   SpO2:  [92 %-94 %]     INTAKE & OUTPUT (Last 24H):    Intake/Output Summary (Last 24 hours) at 3/18/2025 1709  Last data filed at 3/18/2025 0505  Gross per 24 hour   Intake 1486.27 ml   Output --   Net 1486.27 ml         PHYSICAL EXAM:    Intermittently alert   Respirations even unlabored  Abdomen is soft   Right upper quadrant mass    Laboratory Data:  Reviewed and noted in plan where applicable. Please see chart for full laboratory data.    No results for input(s): "CPK", "CPKMB", "TROPONINI", "MB" in the last 24 hours. No results for input(s): "POCTGLUCOSE" in the last 24 hours.     Lab Results   Component Value Date    INR 1.0 02/26/2025       Lab Results   Component Value Date    WBC 7.48 03/17/2025    HGB 10.8 (L) 03/17/2025    HCT 33.8 (L) 03/17/2025    MCV 84 03/17/2025     03/17/2025       BMP  Recent Labs   Lab 03/18/25  0650   GLU 97      K 2.9*      CO2 24   BUN 12   CREATININE 0.8   CALCIUM 11.0*   MG 1.7         Radiology:  Reviewed and noted in plan where applicable. Please see chart for full radiology data.  US Retroperitoneal Complete  Narrative: EXAMINATION:  US RETROPERITONEAL COMPLETE    CLINICAL HISTORY:  hematuria and clots;    TECHNIQUE:  Ultrasound of the kidneys and urinary bladder was performed including color flow and Doppler evaluation of the kidneys.    COMPARISON:  CT T 02/24/2025    FINDINGS:  Right kidney: The right kidney measures 16.9 cm. No cortical thinning. No loss of corticomedullary distinction. " Resistive index measures 0.81.  There is a 10.5 x 8.4 x 10.2 cm heterogeneous solid mass at the lower pole.  There are nearby small hypoechoic foci in the midpole measuring 0.7 and 0.9 cm.  No renal stone. No hydronephrosis.    Left kidney: The left kidney measures 12.7 cm. No cortical thinning. No loss of corticomedullary distinction. Resistive index measures 0.85.  Multiple simple cysts, largest measuring 3.4 x 4.1 x 3.6 cm.  No suspicious mass.  No renal stone. No hydronephrosis.    There is a 3.0 x 2.1 x 4.9 cm hyperechoic masslike focus along the posterior urinary bladder wall without internal vascularity.  Impression: 10.5 x 8.4 x 10.2 cm heterogeneous mass in the lower pole the right kidney concerning for renal cell carcinoma.  Nonspecific smaller adjacent hypoechoic foci in the midpole of the right kidney may represent same process versus small cyst.    3.0 x 2.1 x 4.9 cm hyperechoic masslike focus along the posterior urinary bladder wall.  Correlation with cystography recommended.    Electronically signed by: Darion Musa  Date:    03/18/2025  Time:    08:51       ASSESSMENT/PLAN:     Presumed metastatic renal cell carcinoma.  Suspect hematuria related to bleed from the right kidney.  Family is not keen on any invasive intervention if bleeding persists.  Nephrectomy or embolization would add quite a bit of morbidity.  Ultrasound demonstrates what appears to be a clot within the bladder.  Patient is emptying adequately.  Would try to avoid De Jesus catheter.   I had extensive discussion with the patient and family regarding treatment goals and option of hospice/palliative care.  Patient is scheduled for biopsy of his kidney tomorrow.  In his current state it will be difficult for him to initiate any therapy.  He is a DNR.  Consider palliative care consult.  Another 1 of his sons is scheduled to be here tomorrow.         [1]   No current facility-administered medications on file prior to encounter.      Current Outpatient Medications on File Prior to Encounter   Medication Sig Dispense Refill    amlodipine-valsartan (EXFORGE)  mg per tablet Take 1 tablet by mouth every evening. 90 tablet 1    lactulose (CHRONULAC) 20 gram/30 mL Soln Take 30 mLs (20 g total) by mouth once daily. 420 mL 2    metoprolol succinate (TOPROL-XL) 200 MG 24 hr tablet Take 1 tablet (200 mg total) by mouth every evening. 90 tablet 3    traMADoL (ULTRAM) 50 mg tablet Take 1 tablet (50 mg total) by mouth every 6 (six) hours as needed for Pain. 60 each 5    bisacodyL (DULCOLAX, BISACODYL,) 5 mg EC tablet Take 1 tablet (5 mg total) by mouth daily as needed for Constipation. 30 tablet 1    cyanocobalamin (VITAMIN B-12) 1000 MCG tablet Take 1 tablet (1,000 mcg total) by mouth once daily.      lenvatinib (LENVIMA) 20 mg/day (10 mg x 2) Cap Take 20 mg by mouth once daily. 60 capsule 11    ondansetron (ZOFRAN-ODT) 8 MG TbDL Take 1 tablet (8 mg total) by mouth every 8 (eight) hours as needed (nausea). Take 1 tablet (8 mg) by mouth 30 minutes prior to oral chemotherapy and every 8 hours as needed for nausea/vomiting (MAX = 3 tablets in 24 hours). 60 tablet 5    polyethylene glycol (MIRALAX) 17 gram/dose powder Take 17 g by mouth 2 (two) times daily. 510 g 0    PROPYLENE GLYCOL/ (SYSTANE OPHT) Apply to eye.      senna-docusate 8.6-50 mg (SENNA WITH DOCUSATE SODIUM) 8.6-50 mg per tablet Take 1 tablet by mouth once daily. 60 tablet 0    tadalafiL (CIALIS) 10 MG tablet 1 tablet at least 30 minutes prior to sexual activity; not more than once daily 10 tablet 1

## 2025-03-18 NOTE — H&P (VIEW-ONLY)
Urology Consult    Consulting Physician: Eduardo BRAY    Reason for consultation: Renal mass, hematuria    Chief Complaint:  Hypercalcemia of malignancy    HPI:    Neymar Victoria is a 74 y.o. male who was recently diagnosed with a large right-sided renal mass and numerous pulmonary metastasis.  This is presumably renal cell carcinoma.  He was scheduled for outpatient workup including biopsy and further staging by Dr. Contreras.    In the interim he has had mental status changes.  He was admitted with hypercalcemia.  He has also had gross hematuria.  His wife describes 5 days of significant blood in his urine.  His urine then cleared up and then intermittently turns pink.    He is seen with his wife and his son from [place] at bedside.    Patient is unable to participate in the interview due to altered mental status, sedation.    Past Medical History:   Diagnosis Date    Arthritis     Early dry stage nonexudative age-related macular degeneration of both eyes 9/28/2020    Erectile dysfunction     Hyperlipidemia LDL goal < 160     Hypertension     Seasonal allergies         Past Surgical History:   Procedure Laterality Date    CATARACT EXTRACTION W/ INTRAOCULAR LENS IMPLANT Right 06/10/2021    COLONOSCOPY N/A 02/29/2016    Procedure: COLONOSCOPY;  Surgeon: Aruna Rocha MD;  Location: [facility];  Service: Endoscopy;  Laterality: N/A;    COLONOSCOPY N/A 06/07/2019    Procedure: COLONOSCOPY;  Surgeon: Jacobo Sigala MD;  Location: [facility];  Service: Endoscopy;  Laterality: N/A;    COLONOSCOPY N/A 09/09/2022    Procedure: COLONOSCOPY;  Surgeon: Jacobo Sigala MD;  Location: [facility];  Service: General;  Laterality: N/A;    WISDOM TOOTH EXTRACTION          Social History     Socioeconomic History    Marital status:     Number of children: 2   Occupational History    Occupation: Retired   Tobacco Use    Smoking status: Former     Current packs/day: 0.00     Average packs/day: 0.5 packs/day for  60.0 years (30.0 ttl pk-yrs)     Types: Cigarettes     Start date:      Quit date: 2025     Years since quittin.2    Smokeless tobacco: Never   Substance and Sexual Activity    Alcohol use: Not Currently     Comment: social use of alchol    Drug use: No    Sexual activity: Not Currently     Partners: Female     Birth control/protection: None   Social History Narrative    Lives in [place] with his wife, Alexandra. Retired; formerly worked for the [employer].      Social Drivers of Health     Financial Resource Strain: Patient Declined (3/18/2025)    Overall Financial Resource Strain (CARDIA)     Difficulty of Paying Living Expenses: Patient declined   Food Insecurity: Patient Declined (3/18/2025)    Hunger Vital Sign     Worried About Running Out of Food in the Last Year: Patient declined     Ran Out of Food in the Last Year: Patient declined   Transportation Needs: No Transportation Needs (2024)    PRAPARE - Transportation     Lack of Transportation (Medical): No     Lack of Transportation (Non-Medical): No   Physical Activity: Insufficiently Active (2025)    Exercise Vital Sign     Days of Exercise per Week: 1 day     Minutes of Exercise per Session: 20 min   Stress: Patient Declined (3/18/2025)    [employer] - Occupational Stress Questionnaire     Feeling of Stress : Patient declined   Housing Stability: Patient Declined (3/18/2025)    Housing Stability Vital Sign     Unable to Pay for Housing in the Last Year: Patient declined     Homeless in the Last Year: Patient declined        Family History   Problem Relation Name Age of Onset    Macular degeneration Mother Johan Victoria     Arthritis Mother Johan Victoria     Lymphoma Mother Johan Victoria     Vision loss Mother Johan Victoria     Melanoma Father Nilesh Victoria     Cancer Father Nilesh GODINEZYari Victoria     Stroke Paternal Grandfather Neymar Victoria     Hypertension Other      Psoriasis Neg Hx    "   Lupus Neg Hx      Colon cancer Neg Hx      Uterine cancer Neg Hx      Prostate cancer Neg Hx      Bladder Cancer Neg Hx      Kidney cancer Neg Hx          Review of patient's allergies indicates:   Allergen Reactions    Statins-hmg-coa reductase inhibitors      Myalgia       Medications:      Medications Ordered Prior to Encounter[1]    VITALS (Last 24H):  Temp:  [97.2 °F (36.2 °C)-98.7 °F (37.1 °C)]   Pulse:  []   Resp:  [16-20]   BP: (140-174)/(63-78)   SpO2:  [92 %-94 %]     INTAKE & OUTPUT (Last 24H):    Intake/Output Summary (Last 24 hours) at 3/18/2025 1709  Last data filed at 3/18/2025 0505  Gross per 24 hour   Intake 1486.27 ml   Output --   Net 1486.27 ml         PHYSICAL EXAM:    Intermittently alert   Respirations even unlabored  Abdomen is soft   Right upper quadrant mass    Laboratory Data:  Reviewed and noted in plan where applicable. Please see chart for full laboratory data.    No results for input(s): "CPK", "CPKMB", "TROPONINI", "MB" in the last 24 hours. No results for input(s): "POCTGLUCOSE" in the last 24 hours.     Lab Results   Component Value Date    INR 1.0 02/26/2025       Lab Results   Component Value Date    WBC 7.48 03/17/2025    HGB 10.8 (L) 03/17/2025    HCT 33.8 (L) 03/17/2025    MCV 84 03/17/2025     03/17/2025       BMP  Recent Labs   Lab 03/18/25  0650   GLU 97      K 2.9*      CO2 24   BUN 12   CREATININE 0.8   CALCIUM 11.0*   MG 1.7         Radiology:  Reviewed and noted in plan where applicable. Please see chart for full radiology data.  US Retroperitoneal Complete  Narrative: EXAMINATION:  US RETROPERITONEAL COMPLETE    CLINICAL HISTORY:  hematuria and clots;    TECHNIQUE:  Ultrasound of the kidneys and urinary bladder was performed including color flow and Doppler evaluation of the kidneys.    COMPARISON:  CT T 02/24/2025    FINDINGS:  Right kidney: The right kidney measures 16.9 cm. No cortical thinning. No loss of corticomedullary distinction. " Resistive index measures 0.81.  There is a 10.5 x 8.4 x 10.2 cm heterogeneous solid mass at the lower pole.  There are nearby small hypoechoic foci in the midpole measuring 0.7 and 0.9 cm.  No renal stone. No hydronephrosis.    Left kidney: The left kidney measures 12.7 cm. No cortical thinning. No loss of corticomedullary distinction. Resistive index measures 0.85.  Multiple simple cysts, largest measuring 3.4 x 4.1 x 3.6 cm.  No suspicious mass.  No renal stone. No hydronephrosis.    There is a 3.0 x 2.1 x 4.9 cm hyperechoic masslike focus along the posterior urinary bladder wall without internal vascularity.  Impression: 10.5 x 8.4 x 10.2 cm heterogeneous mass in the lower pole the right kidney concerning for renal cell carcinoma.  Nonspecific smaller adjacent hypoechoic foci in the midpole of the right kidney may represent same process versus small cyst.    3.0 x 2.1 x 4.9 cm hyperechoic masslike focus along the posterior urinary bladder wall.  Correlation with cystography recommended.    Electronically signed by: Darion Musa  Date:    03/18/2025  Time:    08:51       ASSESSMENT/PLAN:     Presumed metastatic renal cell carcinoma.  Suspect hematuria related to bleed from the right kidney.  Family is not keen on any invasive intervention if bleeding persists.  Nephrectomy or embolization would add quite a bit of morbidity.  Ultrasound demonstrates what appears to be a clot within the bladder.  Patient is emptying adequately.  Would try to avoid De Jesus catheter.   I had extensive discussion with the patient and family regarding treatment goals and option of hospice/palliative care.  Patient is scheduled for biopsy of his kidney tomorrow.  In his current state it will be difficult for him to initiate any therapy.  He is a DNR.  Consider palliative care consult.  Another 1 of his sons is scheduled to be here tomorrow.         [1]   No current facility-administered medications on file prior to encounter.      Current Outpatient Medications on File Prior to Encounter   Medication Sig Dispense Refill    amlodipine-valsartan (EXFORGE)  mg per tablet Take 1 tablet by mouth every evening. 90 tablet 1    lactulose (CHRONULAC) 20 gram/30 mL Soln Take 30 mLs (20 g total) by mouth once daily. 420 mL 2    metoprolol succinate (TOPROL-XL) 200 MG 24 hr tablet Take 1 tablet (200 mg total) by mouth every evening. 90 tablet 3    traMADoL (ULTRAM) 50 mg tablet Take 1 tablet (50 mg total) by mouth every 6 (six) hours as needed for Pain. 60 each 5    bisacodyL (DULCOLAX, BISACODYL,) 5 mg EC tablet Take 1 tablet (5 mg total) by mouth daily as needed for Constipation. 30 tablet 1    cyanocobalamin (VITAMIN B-12) 1000 MCG tablet Take 1 tablet (1,000 mcg total) by mouth once daily.      lenvatinib (LENVIMA) 20 mg/day (10 mg x 2) Cap Take 20 mg by mouth once daily. 60 capsule 11    ondansetron (ZOFRAN-ODT) 8 MG TbDL Take 1 tablet (8 mg total) by mouth every 8 (eight) hours as needed (nausea). Take 1 tablet (8 mg) by mouth 30 minutes prior to oral chemotherapy and every 8 hours as needed for nausea/vomiting (MAX = 3 tablets in 24 hours). 60 tablet 5    polyethylene glycol (MIRALAX) 17 gram/dose powder Take 17 g by mouth 2 (two) times daily. 510 g 0    PROPYLENE GLYCOL/ (SYSTANE OPHT) Apply to eye.      senna-docusate 8.6-50 mg (SENNA WITH DOCUSATE SODIUM) 8.6-50 mg per tablet Take 1 tablet by mouth once daily. 60 tablet 0    tadalafiL (CIALIS) 10 MG tablet 1 tablet at least 30 minutes prior to sexual activity; not more than once daily 10 tablet 1

## 2025-03-18 NOTE — PT/OT/SLP EVAL
Occupational Therapy Evaluation and Treatment    Name: Neymar Victoria  MRN: [id number]  Admitting Diagnosis: Hypercalcemia of malignancy  Recent Surgery: * No surgery found *      Recommendations:     Discharge Recommendations: High Intensity Therapy  Level of Assistance Recommended: 24 hours significant assistance  Discharge Equipment Recommendations: to be determined by next level of care  Barriers to discharge:      Assessment:     Neymar Victoria is a 74 y.o. male with a medical diagnosis of Hypercalcemia of malignancy. He presents with performance deficits affecting function including weakness, impaired self care skills, impaired balance, decreased safety awareness, impaired skin, impaired endurance, impaired functional mobility, gait instability, decreased lower extremity function, decreased upper extremity function.     Rehab Prognosis: Good; patient would benefit from acute OT services to address these deficits and reach maximum level of function.    Plan:     Patient to be seen 2 x/week to address the above listed problems via self-care/home management, therapeutic activities, therapeutic exercises  Plan of Care Expires:    Plan of Care Reviewed with: patient, son    Subjective     Chief Complaint: DEBILITY AND GENERALIZED WEAKNESS  Patient Comments/Goals:   Pain/Comfort:  Pain Rating 1: 5/10 (UNABVLE TO VERBALIZE NUMBER)  Location - Orientation 1: generalized  Location 1: groin    Patients cultural, spiritual, Catholic conflicts given the current situation:      Social History:  Living Environment: Patient lives with their spouse in a single story home with number of outside stair(s): 0  Prior Level of Function: Prior to admission, patient was modified independent  Roles and Routines: Patient was driving and working prior to admission.  Equipment Used at Home: cane, straight  DME owned (not currently used): none  Assistance Upon Discharge: facility staff    Objective:     Communicated with NURSE MONK AND JOSE  CHART REVIEW prior to session. Patient found HOB elevated with telemetry, bed alarm upon OT entry to room.    General Precautions: Standard, fall (NPO)   Orthopedic Precautions: N/A   Braces: N/A    Respiratory Status: Room air    Occupational Performance      Bed Mobility:   Rolling/Turning to Left with minimum assistance  Rolling/Turning to Right with minimum assistance  Scooting anteriorly to EOB to have both feet planted on floor: minimum assistance  Supine to sit from right side of bed with minimum assistance  Supine to sit from left side of bed with minimum assistance    Functional Mobility/Transfers:  Sit <> Stand Transfer with minimum assistance with hand-held assist  Bed <> Chair Transfer using Step Transfer technique with minimum assistance with hand-held assist      Activities of Daily Living:  Upper Body Dressing: minimum assistance  Lower Body Dressing: contact guard assistance    Cognitive/Visual Perceptual:  Cognitive/Psychosocial Skills:    -     Oriented to: Person, Place, Time, Situation  -     Follows Commands/attention: Follows multistep  commands  -     Communication: clear/fluent  -     Memory: No Deficits noted  -     Safety awareness/insight to disability: impaired    Physical Exam:  Upper Extremity Range of Motion:     -       Right Upper Extremity: WFL  -       Left Upper Extremity: WFL  Upper Extremity Strength:    -       Right Upper Extremity: MMT: 4/5 GROSSLY  -       Left Upper Extremity: MMT: 4/5 GROSSLY   Strength:    -       Right Upper Extremity: MMT: 4/5 GROSSLY  -       Left Upper Extremity: MMT: 4/5 GROSSLY    AMPAC 6 Click ADL:  AMPAC Total Score: 19    Treatment & Education:  Educated patient on importance of increased tolerance to upright position and direct impact on CV endurance and strength. Patient encouraged to sit up in chair/ EOB, for a minimum of 2 consecutive hours including for all meals.. Encouraged patient to perform AROM TE to BUE throughout the day within  all available planes of motion. Re enforced importance of utilizing call light to meet needs in room and not attempt to get up without staff assistance. Patient verbalized understanding and agreed to comply.           Patient clear to stand pivot transfer with RN/PCT, assist xmin a  , step<pivot transfer .    Patient left HOB elevated with all lines intact, call button in reach, RN notified, bed alarm on, spouse present, and AVAS present .    GOALS:   Multidisciplinary Problems       Occupational Therapy Goals          Problem: Occupational Therapy    Goal Priority Disciplines Outcome Interventions   Occupational Therapy Goal     OT, PT/OT     Description: O.T GOALS TO BE MET BY 4-1-25  PT WILL TOLERATE 1 SET X 15 REPS B UE ROM EXERCISE  S WITH TOILET TRANSFERS  S WITH UE DRESSING                         DME Justifications:   Neymar's mobility limitation cannot be sufficiently resolved by the use of a cane. His functional mobility deficit can be sufficiently resolved with the use of a Rolling Walker. Patient's mobility limitation significantly impairs their ability to participate in one of more activities of daily living.  The use of a RW will significantly improve the patient's ability to participate in MRADLS and the patient will use it on regular basis in the home.    History:     Past Medical History:   Diagnosis Date    Arthritis     Early dry stage nonexudative age-related macular degeneration of both eyes 9/28/2020    Erectile dysfunction     Hyperlipidemia LDL goal < 160     Hypertension     Seasonal allergies          Past Surgical History:   Procedure Laterality Date    CATARACT EXTRACTION W/ INTRAOCULAR LENS IMPLANT Right 06/10/2021    COLONOSCOPY N/A 02/29/2016    Procedure: COLONOSCOPY;  Surgeon: Aruna Rocha MD;  Location: [facility];  Service: Endoscopy;  Laterality: N/A;    COLONOSCOPY N/A 06/07/2019    Procedure: COLONOSCOPY;  Surgeon: Jacobo Sigala MD;  Location: [facility];  Service:  Endoscopy;  Laterality: N/A;    COLONOSCOPY N/A 09/09/2022    Procedure: COLONOSCOPY;  Surgeon: Jacobo Sigala MD;  Location: [facility];  Service: General;  Laterality: N/A;    WISDOM TOOTH EXTRACTION         Time Tracking:     OT Date of Treatment: 03/18/25  OT Start Time: 1030  OT Stop Time: 1100  OT Total Time (min): 30 min    Billable Minutes: Evaluation 15 minutes and Therapeutic Activity 15 minutes    3/18/2025

## 2025-03-18 NOTE — ASSESSMENT & PLAN NOTE
Hem/onc and urology consulted   Awaiting renal mass biopsy  Mri brain pending  Physical/occupational therapy recommending rehab

## 2025-03-18 NOTE — CONSULTS
Sw met with pt's spouse and pt's son at bedside to discuss d/c plans for IP Rehab upon d/c. Pt's family unsure of the d/c plan at this time due to needed biopsy and results. Pt's family agreeable for Sw to send IP Rehab referrals in the area. Family to continue discussion with Attending MD, Dr. Clark, to determine d/c dispo and hospital progress.

## 2025-03-18 NOTE — HOSPITAL COURSE
3/18 admitted for generalized weakness, falls, confusion related to hypercalcemia of malignancy. Plans for mri brain and renal biopsy per urology and oncology. Interventional radiology aware and plans for tomorrow. Physical/occupational therapy recommending rehab.   3/19 hypercalcemia improving. interventional radiology consulted for renal mass and underwent biopsy. Await results in approximately 5 days. Physical/occupational therapy recommending low intensity therapy. Mri brain with concerns for mets to brain. Hospice info visit based on urology recommendations. Wife uncertain of next steps but likely will want to follow up with primary oncologist.    3/20  After discussing with primary oncologist the patient's functional status, mental decline despite correction of hypercalcemia and additional studies showing brain lesion on mri (renal mass biopsy results still pending), hospice recommended. I shared with family members the patient's diagnosis of stage 4 renal cell carcinoma with distant metastases and primary oncologist's recommendations during family meeting. Patient unable to participate. Case management consulted for hospice. Hospice consents signed. Patient medically stable for discharge home with hospice. Discussed with patient's wife and son that patient may pursue outpatient chemotherapy if functional status improves.    Awake but restless. Slurred speech. Underweight. Ill appearing. Weakness. In a state of decline since admission. Several team members present.    Patient seen and evaluated by me. Patient was determined to be suitable for discharge. Patient deemed stable for discharge to home with hospice.

## 2025-03-18 NOTE — ASSESSMENT & PLAN NOTE
Patient's blood pressure range in the last 24 hours was: BP  Min: 140/63  Max: 174/76.The patient's inpatient anti-hypertensive regimen is listed below:  Current Antihypertensives  amLODIPine tablet 10 mg, Nightly, Oral  valsartan tablet 320 mg, Nightly, Oral  hydrALAZINE injection 10 mg, Every 6 hours PRN, Intravenous    Plan  - BP is uncontrolled, will adjust as follows: mildly elevated, receiving fluids, home medication(s) resumed  - intravenous prn hydralazine

## 2025-03-18 NOTE — ASSESSMENT & PLAN NOTE
Hypercalcemia is likely due to Malignancy. The patient has the following symptoms due to their hypercalcemia: weakness. Their most recent calcium and albumin results are listed below.  Recent Labs     03/17/25  0647 03/18/25  0650   CALCIUM 14.8* 11.0*   ALBUMIN 2.8* 2.6*     Plan  - Obtain the following labs to work up the cause of hypercalcemia: PTH   PTH, Intact   Date Value Ref Range Status   03/17/2025 5.1 (L) 9.0 - 77.0 pg/mL Final    .   - Treat the hypercalcemia with: IV fluids ordered at a rate of 125 ml/hr and bolus and zometa.   - The patient's hypercalcemia is stable.   - repeat calcium with modest improvement  Hem/onc consulted   Speech consulted

## 2025-03-18 NOTE — SUBJECTIVE & OBJECTIVE
Interval History: See hospital course for today      Review of Systems   Respiratory:  Negative for shortness of breath.    Allergic/Immunologic: Positive for immunocompromised state.   Neurological:  Positive for weakness.   Psychiatric/Behavioral:  Positive for agitation, confusion and decreased concentration.      Objective:     Vital Signs (Most Recent):  Temp: 97.2 °F (36.2 °C) (03/18/25 1123)  Pulse: 89 (03/18/25 1123)  Resp: 16 (03/18/25 1408)  BP: (!) 165/78 (03/18/25 1123)  SpO2: (!) 94 % (03/18/25 1123) Vital Signs (24h Range):  Temp:  [97.2 °F (36.2 °C)-98.7 °F (37.1 °C)] 97.2 °F (36.2 °C)  Pulse:  [] 89  Resp:  [16-20] 16  SpO2:  [92 %-95 %] 94 %  BP: (140-174)/(63-78) 165/78     Weight: 67.1 kg (147 lb 14.9 oz)  Body mass index is 23.88 kg/m².    Intake/Output Summary (Last 24 hours) at 3/18/2025 1508  Last data filed at 3/18/2025 0505  Gross per 24 hour   Intake 1486.27 ml   Output --   Net 1486.27 ml         Physical Exam  Vitals and nursing note reviewed. Exam conducted with a chaperone present (family, nursing).   Constitutional:       General: He is not in acute distress.     Appearance: He is ill-appearing. He is not toxic-appearing.   HENT:      Head: Normocephalic and atraumatic.   Cardiovascular:      Rate and Rhythm: Normal rate.   Pulmonary:      Effort: Pulmonary effort is normal. No respiratory distress.   Abdominal:      Palpations: Abdomen is soft.      Tenderness: There is no abdominal tenderness.   Musculoskeletal:      Right lower leg: No edema.      Left lower leg: No edema.   Skin:     General: Skin is warm.      Coloration: Skin is pale.   Neurological:      Mental Status: He is lethargic.      Motor: Weakness present.   Psychiatric:         Behavior: Behavior is slowed.               Significant Labs: All pertinent labs within the past 24 hours have been reviewed.  CBC:   Recent Labs   Lab 03/17/25  0647   WBC 7.48   HGB 10.8*   HCT 33.8*        CMP:   Recent Labs    Lab 03/17/25  0647 03/18/25  0650   * 138   K 3.1* 2.9*   CL 95 102   CO2 28 24   GLU 97 97   BUN 15 12   CREATININE 0.9 0.8   CALCIUM 14.8* 11.0*   PROT 7.1 5.5*   ALBUMIN 2.8* 2.6*   BILITOT 0.6 0.5   ALKPHOS 88 92   AST 17 29   ALT 18 18   ANIONGAP 12 12     Magnesium:   Recent Labs   Lab 03/17/25  0647 03/18/25  0650   MG 2.0 1.7       Significant Imaging: I have reviewed all pertinent imaging results/findings within the past 24 hours.  CT: I have reviewed all pertinent results/findings within the past 24 hours and my personal findings are:  head without contrast showing chronic ischemic changes   U/S: I have reviewed all pertinent results/findings within the past 24 hours and my personal findings are:  thyroid showing no findings; us retroperitoneal with hyperechoic masslike focus in bladder wall  Mri lumbar spine showing severe foraminal stenosis; mri brain pending; xray pelvis showing no fracture

## 2025-03-18 NOTE — PLAN OF CARE
Discussed poc with pt, pt verbalized understanding    Purposeful rounding every 2hours    VS monitored for need of PRN interventions   Cardiac monitoring in use, pt is NSR, tele monitor # [id number]  Fall precautions in place, remains injury free  Pt denies c/o N/V  Pain under control with PRN meds    IVFs  Abx given as prescribed  Bed locked at lowest position  Call light within reach    Chart check complete  Will cont with POC

## 2025-03-18 NOTE — PLAN OF CARE
[facility] - Med Surg  Discharge Assessment    Primary Care Provider: Susan Chávez MD     Discharge Assessment (most recent)       BRIEF DISCHARGE ASSESSMENT - 03/18/25 1413          Discharge Planning    Assessment Type Discharge Planning Brief Assessment     Resource/Environmental Concerns none     Support Systems Spouse/significant other     Assistance Needed Independent     Equipment Currently Used at Home cane, straight     Current Living Arrangements home     Patient/Family Anticipates Transition to inpatient rehabilitation facility     Patient/Family Anticipated Services at Transition none     DME Needed Upon Discharge  none     Discharge Plan A Rehab

## 2025-03-18 NOTE — PT/OT/SLP EVAL
Physical Therapy Evaluation    Patient Name:  Neymar Victoria   MRN:  [id number]    Recommendations:     Discharge Recommendations: High Intensity Therapy   Discharge Equipment Recommendations: to be determined by next level of care   Barriers to discharge: Decreased caregiver support    Assessment:     Neymar Victoria is a 74 y.o. male admitted with a medical diagnosis of Hypercalcemia of malignancy.  He presents with the following impairments/functional limitations: weakness, impaired endurance, impaired self care skills, impaired functional mobility, gait instability, impaired balance, pain, decreased safety awareness, decreased lower extremity function, decreased upper extremity function, impaired cognition, decreased ROM .    Rehab Prognosis: Good; patient would benefit from acute skilled PT services to address these deficits and reach maximum level of function.    Recent Surgery: * No surgery found *      Plan:     During this hospitalization, patient to be seen 3 x/week to address the identified rehab impairments via gait training, therapeutic activities, therapeutic exercises and progress toward the following goals:    Plan of Care Expires:  04/01/25    Subjective     Chief Complaint: BACK PAIN   Patient/Family Comments/goals: NONE STATED   Pain/Comfort:  Location 1: back (UNABLE TO RELATE TO A NUMBER)    Patients cultural, spiritual, Buddhist conflicts given the current situation:      Living Environment:   PT LIVES AT HOME WITH WIFE IN A ONE STORY HOME WITH NO STEPS TO ENTER.  Prior to admission, patients level of function was KATIE WITH SPC RECENTLY PRIOR TO WILLIAM PT WAS IND, DRIVING AND RETIRED.  Equipment used at home: cane, straight.  DME owned (not currently used): none.  Upon discharge, patient will have assistance from WIFE .    Objective:     Communicated with NURSE WILLIAM AND EPIC CHART REVIEW  prior to session.  Patient found supine with peripheral IV, telemetry, bed alarm  upon PT entry to  "room.    General Precautions: Standard, fall, NPO  Orthopedic Precautions:N/A   Braces: N/A  Respiratory Status: Room air    Exams:  Cognitive Exam:  Patient is oriented to Person  RLE ROM: WFL  RLE Strength: LIMITED  LLE ROM: WFL  LLE Strength: LIMITED    Functional Mobility:  Bed Mobility:     Rolling Left:  minimum assistance  Scooting: minimum assistance  Supine to Sit: minimum assistance  Transfers:     Sit to Stand:  minimum assistance with rolling walker  Bed to Chair: minimum assistance with  rolling walker  using  Stand Pivot  Gait: PT GT TRAINED X 20' WITH RW AND MIN A       AM-PAC 6 CLICK MOBILITY  Total Score:16       Treatment & Education:  GT. BELT AND  SOCKS DONNED PRIOR TO OOB MOBILITY.  PT GIVEN STEP BY STEP VERBAL CUES FOR GT TRAINING AND RW USE.   PT RETURNED SEATED EOB AND SUP IN BED WITH MIN A. P.T. EDUCATED PT AND SON ON ROLE OF P.T. AND PT EDUCATED ON "CALL DON'T FALL", ENCOURAGED TO CALL FOR ASSISTANCE WITH ALL NEEDS FOR OOB MOBILITY.      Patient left supine with call button in reach and bed alarm on.    GOALS:   Multidisciplinary Problems       Physical Therapy Goals          Problem: Physical Therapy    Goal Priority Disciplines Outcome Interventions   Physical Therapy Goal     PT, PT/OT     Description: LT25  1. PT WILL COMPLETE BED MOBILITY WITH SBA.   2. PT WILL STAND T/F TO CHAIR WITH RW AND SBA FOR OOB TO CHAIR.  3. PT WILL GT TRAIN X 150' WITH RW AND SBA TO PROGRESS GT.  4. PT WILL INC AMPAC SCORE BY 2 POINTS TO PROGRESS GROSS FUNC MOBILITY.                            DME Justifications:  No DME recommended requiring DME justifications    History:     Past Medical History:   Diagnosis Date    Arthritis     Early dry stage nonexudative age-related macular degeneration of both eyes 2020    Erectile dysfunction     Hyperlipidemia LDL goal < 160     Hypertension     Seasonal allergies        Past Surgical History:   Procedure Laterality Date    CATARACT EXTRACTION W/ " INTRAOCULAR LENS IMPLANT Right 06/10/2021    COLONOSCOPY N/A 02/29/2016    Procedure: COLONOSCOPY;  Surgeon: Aruna Rocha MD;  Location: [facility];  Service: Endoscopy;  Laterality: N/A;    COLONOSCOPY N/A 06/07/2019    Procedure: COLONOSCOPY;  Surgeon: Jacobo Sigala MD;  Location: [facility];  Service: Endoscopy;  Laterality: N/A;    COLONOSCOPY N/A 09/09/2022    Procedure: COLONOSCOPY;  Surgeon: Jacobo Sigala MD;  Location: [facility];  Service: General;  Laterality: N/A;    WISDOM TOOTH EXTRACTION         Time Tracking:     PT Received On: 03/18/25  PT Start Time: 0925     PT Stop Time: 0950  PT Total Time (min): 25 min     Billable Minutes: Evaluation 15 and Gait Training 10      03/18/2025

## 2025-03-19 LAB
1,25(OH)2D3 SERPL-MCNC: 42 PG/ML (ref 20–79)
ALBUMIN SERPL BCP-MCNC: 2.3 G/DL (ref 3.5–5.2)
ALP SERPL-CCNC: 89 U/L (ref 40–150)
ALT SERPL W/O P-5'-P-CCNC: 15 U/L (ref 10–44)
ANION GAP SERPL CALC-SCNC: 13 MMOL/L (ref 8–16)
AST SERPL-CCNC: 24 U/L (ref 10–40)
BILIRUB SERPL-MCNC: 0.4 MG/DL (ref 0.1–1)
BUN SERPL-MCNC: 13 MG/DL (ref 8–23)
CALCIUM SERPL-MCNC: 10.2 MG/DL (ref 8.7–10.5)
CHLORIDE SERPL-SCNC: 105 MMOL/L (ref 95–110)
CO2 SERPL-SCNC: 18 MMOL/L (ref 23–29)
CREAT SERPL-MCNC: 1 MG/DL (ref 0.5–1.4)
EST. GFR  (NO RACE VARIABLE): >60 ML/MIN/1.73 M^2
GLUCOSE SERPL-MCNC: 72 MG/DL (ref 70–110)
POTASSIUM SERPL-SCNC: 3.3 MMOL/L (ref 3.5–5.1)
PROT SERPL-MCNC: 6 G/DL (ref 6–8.4)
SODIUM SERPL-SCNC: 136 MMOL/L (ref 136–145)

## 2025-03-19 PROCEDURE — 88333 PATH CONSLTJ SURG CYTO XM 1: CPT | Performed by: STUDENT IN AN ORGANIZED HEALTH CARE EDUCATION/TRAINING PROGRAM

## 2025-03-19 PROCEDURE — 63600175 PHARM REV CODE 636 W HCPCS: Performed by: HOSPITALIST

## 2025-03-19 PROCEDURE — 88341 IMHCHEM/IMCYTCHM EA ADD ANTB: CPT | Mod: 59 | Performed by: STUDENT IN AN ORGANIZED HEALTH CARE EDUCATION/TRAINING PROGRAM

## 2025-03-19 PROCEDURE — 63600175 PHARM REV CODE 636 W HCPCS: Performed by: FAMILY MEDICINE

## 2025-03-19 PROCEDURE — 97110 THERAPEUTIC EXERCISES: CPT

## 2025-03-19 PROCEDURE — 88305 TISSUE EXAM BY PATHOLOGIST: CPT | Mod: 26,,, | Performed by: STUDENT IN AN ORGANIZED HEALTH CARE EDUCATION/TRAINING PROGRAM

## 2025-03-19 PROCEDURE — 80053 COMPREHEN METABOLIC PANEL: CPT | Performed by: FAMILY MEDICINE

## 2025-03-19 PROCEDURE — 63600175 PHARM REV CODE 636 W HCPCS: Performed by: RADIOLOGY

## 2025-03-19 PROCEDURE — 97530 THERAPEUTIC ACTIVITIES: CPT

## 2025-03-19 PROCEDURE — 88305 TISSUE EXAM BY PATHOLOGIST: CPT | Performed by: STUDENT IN AN ORGANIZED HEALTH CARE EDUCATION/TRAINING PROGRAM

## 2025-03-19 PROCEDURE — 88333 PATH CONSLTJ SURG CYTO XM 1: CPT | Mod: 26,,, | Performed by: STUDENT IN AN ORGANIZED HEALTH CARE EDUCATION/TRAINING PROGRAM

## 2025-03-19 PROCEDURE — 36415 COLL VENOUS BLD VENIPUNCTURE: CPT | Performed by: FAMILY MEDICINE

## 2025-03-19 PROCEDURE — 88341 IMHCHEM/IMCYTCHM EA ADD ANTB: CPT | Mod: 26,,, | Performed by: STUDENT IN AN ORGANIZED HEALTH CARE EDUCATION/TRAINING PROGRAM

## 2025-03-19 PROCEDURE — 88342 IMHCHEM/IMCYTCHM 1ST ANTB: CPT | Mod: 26,,, | Performed by: STUDENT IN AN ORGANIZED HEALTH CARE EDUCATION/TRAINING PROGRAM

## 2025-03-19 PROCEDURE — 11000001 HC ACUTE MED/SURG PRIVATE ROOM

## 2025-03-19 PROCEDURE — 0TB03ZX EXCISION OF RIGHT KIDNEY, PERCUTANEOUS APPROACH, DIAGNOSTIC: ICD-10-PCS | Performed by: RADIOLOGY

## 2025-03-19 PROCEDURE — 25000003 PHARM REV CODE 250: Performed by: FAMILY MEDICINE

## 2025-03-19 PROCEDURE — 88342 IMHCHEM/IMCYTCHM 1ST ANTB: CPT | Performed by: STUDENT IN AN ORGANIZED HEALTH CARE EDUCATION/TRAINING PROGRAM

## 2025-03-19 RX ORDER — MIDAZOLAM HYDROCHLORIDE 1 MG/ML
INJECTION, SOLUTION INTRAMUSCULAR; INTRAVENOUS CODE/TRAUMA/SEDATION MEDICATION
Status: COMPLETED | OUTPATIENT
Start: 2025-03-19 | End: 2025-03-19

## 2025-03-19 RX ORDER — FENTANYL CITRATE 50 UG/ML
INJECTION, SOLUTION INTRAMUSCULAR; INTRAVENOUS CODE/TRAUMA/SEDATION MEDICATION
Status: COMPLETED | OUTPATIENT
Start: 2025-03-19 | End: 2025-03-19

## 2025-03-19 RX ADMIN — FENTANYL CITRATE 25 MCG: 0.05 INJECTION, SOLUTION INTRAMUSCULAR; INTRAVENOUS at 11:03

## 2025-03-19 RX ADMIN — SODIUM CHLORIDE: 9 INJECTION, SOLUTION INTRAVENOUS at 01:03

## 2025-03-19 RX ADMIN — CEFTRIAXONE 1 G: 1 INJECTION, POWDER, FOR SOLUTION INTRAMUSCULAR; INTRAVENOUS at 11:03

## 2025-03-19 RX ADMIN — FUROSEMIDE 20 MG: 10 INJECTION, SOLUTION INTRAMUSCULAR; INTRAVENOUS at 08:03

## 2025-03-19 RX ADMIN — ZOLPIDEM TARTRATE 5 MG: 5 TABLET, FILM COATED ORAL at 08:03

## 2025-03-19 RX ADMIN — POTASSIUM CHLORIDE 40 MEQ: 1500 TABLET, EXTENDED RELEASE ORAL at 08:03

## 2025-03-19 RX ADMIN — AMLODIPINE BESYLATE 10 MG: 10 TABLET ORAL at 08:03

## 2025-03-19 RX ADMIN — MORPHINE SULFATE 1 MG: 2 INJECTION, SOLUTION INTRAMUSCULAR; INTRAVENOUS at 08:03

## 2025-03-19 RX ADMIN — Medication 400 MG: at 08:03

## 2025-03-19 RX ADMIN — SODIUM CHLORIDE: 9 INJECTION, SOLUTION INTRAVENOUS at 09:03

## 2025-03-19 RX ADMIN — MIDAZOLAM 0.5 MG: 1 INJECTION INTRAMUSCULAR; INTRAVENOUS at 11:03

## 2025-03-19 RX ADMIN — VALSARTAN 320 MG: 160 TABLET, FILM COATED ORAL at 08:03

## 2025-03-19 RX ADMIN — OXYCODONE HYDROCHLORIDE 5 MG: 5 TABLET ORAL at 02:03

## 2025-03-19 NOTE — ACP (ADVANCE CARE PLANNING)
Code Status  I reengaged the family (wife and son) and patient in a discussion of the patients advanced and life limiting illness,I engaged the the patient and family in a voluntary conversation about the patient's preferences for care  at the very end of life. The patient wishes to have a natural, peaceful death.  Along those lines, the patient does not wish to have CPR or other invasive treatments performed when his heart and/or breathing stops. I communicated to the patient and family that a DNR order would be placed in his medical record to reflect this preference.    A total of 21 min was spent on advance care planning, goals of care discussion, emotional support, formulating and communicating prognosis and exploring burden/benefit of various approaches of treatment. This discussion occurred on a fully voluntary basis with the verbal consent of the patient and/or family.

## 2025-03-19 NOTE — CONSULTS
Bozena notified by Evangelina with [facility] that she was contacted by Attending MD, Dr. Clark, to meet with pt/family for hospice informational visit. Evangelina to meet with pt/family this morning and updated care team on family plans for d/c.

## 2025-03-19 NOTE — SUBJECTIVE & OBJECTIVE
Interval History: See hospital course for today      Review of Systems   Unable to perform ROS: Mental status change     Objective:     Vital Signs (Most Recent):  Temp: 98.4 °F (36.9 °C) (03/19/25 1259)  Pulse: 79 (03/19/25 1259)  Resp: 18 (03/19/25 1456)  BP: (!) 159/73 (03/19/25 1259)  SpO2: (!) 92 % (03/19/25 1259) Vital Signs (24h Range):  Temp:  [97.6 °F (36.4 °C)-100.1 °F (37.8 °C)] 98.4 °F (36.9 °C)  Pulse:  [69-91] 79  Resp:  [14-20] 18  SpO2:  [81 %-100 %] 92 %  BP: (132-172)/(66-77) 159/73     Weight: 65.3 kg (143 lb 15.4 oz)  Body mass index is 23.24 kg/m².    Intake/Output Summary (Last 24 hours) at 3/19/2025 1540  Last data filed at 3/18/2025 1959  Gross per 24 hour   Intake 240 ml   Output --   Net 240 ml         Physical Exam  Vitals and nursing note reviewed. Exam conducted with a chaperone present (visitors).   Constitutional:       General: He is awake. He is not in acute distress.     Appearance: He is underweight. He is ill-appearing. He is not toxic-appearing.   HENT:      Head: Normocephalic and atraumatic.   Cardiovascular:      Rate and Rhythm: Normal rate.   Pulmonary:      Effort: Pulmonary effort is normal. No respiratory distress.   Abdominal:      Palpations: Abdomen is soft.      Tenderness: There is no abdominal tenderness.   Musculoskeletal:      Right lower leg: No edema.      Left lower leg: No edema.   Skin:     General: Skin is warm.   Neurological:      Mental Status: He is lethargic.      Motor: Weakness present.      Comments: Slurred speech   Psychiatric:         Speech: Speech is delayed.         Behavior: Behavior is slowed.               Significant Labs: All pertinent labs within the past 24 hours have been reviewed.    CMP:   Recent Labs   Lab 03/18/25  0650 03/18/25  1704 03/19/25  0604    136 136   K 2.9* 3.6 3.3*    101 105   CO2 24 22* 18*   GLU 97 79 72   BUN 12 11 13   CREATININE 0.8 1.0 1.0   CALCIUM 11.0* 11.4* 10.2   PROT 5.5* 6.6 6.0   ALBUMIN 2.6*  2.6* 2.3*   BILITOT 0.5 0.5 0.4   ALKPHOS 92 92 89   AST 29 28 24   ALT 18 18 15   ANIONGAP 12 13 13     Magnesium:   Recent Labs   Lab 03/18/25  0650   MG 1.7       Significant Imaging: I have reviewed all pertinent imaging results/findings within the past 24 hours.  CT: I have reviewed all pertinent results/findings within the past 24 hours and my personal findings are:  guided renal mass biopsy   U/S: I have reviewed all pertinent results/findings within the past 24 hours and my personal findings are:  retroperitoneal showing hyperechoic masslike focus  Mri brain showing probable metastatic lesion

## 2025-03-19 NOTE — PLAN OF CARE
Problem: Adult Inpatient Plan of Care  Goal: Plan of Care Review  Outcome: Progressing  Goal: Patient-Specific Goal (Individualized)  Outcome: Progressing  Goal: Absence of Hospital-Acquired Illness or Injury  Outcome: Progressing  Goal: Readiness for Transition of Care  Outcome: Progressing     Problem: Skin Injury Risk Increased  Goal: Skin Health and Integrity  Outcome: Progressing

## 2025-03-19 NOTE — PROGRESS NOTES
[facility] Medicine  Progress Note    Patient Name: Neymar Victoria  MRN: [id number]  Patient Class: IP- Inpatient   Admission Date: 3/17/2025  Length of Stay: 1 days  Attending Physician: Manuelito Clark MD  Primary Care Provider: Susan Chávez MD        Subjective     Principal Problem:Hypercalcemia of malignancy        HPI:  74M  has a past medical history of Arthritis, Early dry stage nonexudative age-related macular degeneration of both eyes, Erectile dysfunction, Hyperlipidemia LDL goal < 160, Hypertension, and Seasonal allergies.  Presents to emergency department for generalized weakness and falls. Associated with back pain and weight loss. Recently seen in hem/onc for renal cell carcinoma with pulmonary mets. Denies bladder or bowel incontinence. Neighbor present and endorses improvement in mentation since being evaluated in emergency department. Does not wear supplemental oxygen at baseline.     Initial workup in emergency department reveals hypertension, hypoxia on room air, improved with nasal cannula. Afebrile. Cbc with normocytic anemia. Cmp showing mild hyponatremia, hypokalemia and hypercalcemia. Bnp, ck, and troponin(s) within normal limits. Pth 5.1 urinalysis with hematuria. Ct head without contrast with chronic microvascular ischemic findings. Ct lumbar spine without contrast showing large right renal mass with pulmonary mets but no bony mets and L5-S1 foraminal stenosis. Chest x-ray with no active disease. Xray pelvis with on fracture. Mri lumbar spine with and without contrast showing same as ct lumbar spine. Us thyroid with no definite parathyroid tissue.    Hospital medicine consulted for hypercalcemia of malignancy. Hem/onc consulted.    Overview/Hospital Course:  3/18 admitted for generalized weakness, falls, confusion related to hypercalcemia of malignancy. Plans for mri brain and renal biopsy per urology and oncology. Interventional radiology aware and plans for tomorrow.  Physical/occupational therapy recommending rehab.   3/19 hypercalcemia improving. interventional radiology consulted for renal mass and underwent biopsy. Await results in approximately 5 days. Physical/occupational therapy recommending low intensity therapy. Mri brain with concerns for mets to brain. Hospice info visit based on urology recommendations. Wife uncertain of next steps but likely will want to follow up with primary oncologist.    Interval History: See hospital course for today      Review of Systems   Unable to perform ROS: Mental status change     Objective:     Vital Signs (Most Recent):  Temp: 98.4 °F (36.9 °C) (03/19/25 1259)  Pulse: 79 (03/19/25 1259)  Resp: 18 (03/19/25 1456)  BP: (!) 159/73 (03/19/25 1259)  SpO2: (!) 92 % (03/19/25 1259) Vital Signs (24h Range):  Temp:  [97.6 °F (36.4 °C)-100.1 °F (37.8 °C)] 98.4 °F (36.9 °C)  Pulse:  [69-91] 79  Resp:  [14-20] 18  SpO2:  [81 %-100 %] 92 %  BP: (132-172)/(66-77) 159/73     Weight: 65.3 kg (143 lb 15.4 oz)  Body mass index is 23.24 kg/m².    Intake/Output Summary (Last 24 hours) at 3/19/2025 1540  Last data filed at 3/18/2025 1959  Gross per 24 hour   Intake 240 ml   Output --   Net 240 ml         Physical Exam  Vitals and nursing note reviewed. Exam conducted with a chaperone present (visitors).   Constitutional:       General: He is awake. He is not in acute distress.     Appearance: He is underweight. He is ill-appearing. He is not toxic-appearing.   HENT:      Head: Normocephalic and atraumatic.   Cardiovascular:      Rate and Rhythm: Normal rate.   Pulmonary:      Effort: Pulmonary effort is normal. No respiratory distress.   Abdominal:      Palpations: Abdomen is soft.      Tenderness: There is no abdominal tenderness.   Musculoskeletal:      Right lower leg: No edema.      Left lower leg: No edema.   Skin:     General: Skin is warm.   Neurological:      Mental Status: He is lethargic.      Motor: Weakness present.      Comments: Slurred  speech   Psychiatric:         Speech: Speech is delayed.         Behavior: Behavior is slowed.               Significant Labs: All pertinent labs within the past 24 hours have been reviewed.    CMP:   Recent Labs   Lab 03/18/25  0650 03/18/25  1704 03/19/25  0604    136 136   K 2.9* 3.6 3.3*    101 105   CO2 24 22* 18*   GLU 97 79 72   BUN 12 11 13   CREATININE 0.8 1.0 1.0   CALCIUM 11.0* 11.4* 10.2   PROT 5.5* 6.6 6.0   ALBUMIN 2.6* 2.6* 2.3*   BILITOT 0.5 0.5 0.4   ALKPHOS 92 92 89   AST 29 28 24   ALT 18 18 15   ANIONGAP 12 13 13     Magnesium:   Recent Labs   Lab 03/18/25  0650   MG 1.7       Significant Imaging: I have reviewed all pertinent imaging results/findings within the past 24 hours.  CT: I have reviewed all pertinent results/findings within the past 24 hours and my personal findings are:  guided renal mass biopsy   U/S: I have reviewed all pertinent results/findings within the past 24 hours and my personal findings are:  retroperitoneal showing hyperechoic masslike focus  Mri brain showing probable metastatic lesion       Assessment & Plan  Hypercalcemia of malignancy  Hypercalcemia is likely due to Malignancy. The patient has the following symptoms due to their hypercalcemia: weakness. Their most recent calcium and albumin results are listed below.  Recent Labs     03/18/25  0650 03/18/25  1704 03/19/25  0604   CALCIUM 11.0* 11.4* 10.2   ALBUMIN 2.6* 2.6* 2.3*     Plan  - Obtain the following labs to work up the cause of hypercalcemia: PTH   PTH, Intact   Date Value Ref Range Status   03/17/2025 5.1 (L) 9.0 - 77.0 pg/mL Final    .   - Treat the hypercalcemia with: IV fluids ordered at a rate of 125 ml/hr and lasix; received zometa and bolus .   - The patient's hypercalcemia is  stable .   - repeat calcium with improvement  Mentation remains altered     Essential hypertension  Patient's blood pressure range in the last 24 hours was: BP  Min: 132/69  Max: 172/77.The patient's inpatient  anti-hypertensive regimen is listed below:  Current Antihypertensives  amLODIPine tablet 10 mg, Nightly, Oral  valsartan tablet 320 mg, Nightly, Oral  hydrALAZINE injection 10 mg, Every 6 hours PRN, Intravenous  furosemide injection 20 mg, Every 12 hours, Intravenous    Plan  - BP is uncontrolled, will adjust as follows: mildly elevated, receiving fluids, home medication(s) resumed  - intravenous prn hydralazine  Weakness  Physical/occupational therapy recommending low intensity therapy  Renal mass with pulmonary metastasis  Hem/onc and urology consulted   Awaiting renal mass biopsy results  Mri brain with probable metastatic lesion   Physical/occupational therapy recommending low intensity therapy    Hematuria  Likely due to renal cell mass  Us retroperitoneal concerning for hyperechoic mass in bladder wall  Post void bladder scan showing >300mL  Urology aware    VTE Risk Mitigation (From admission, onward)           Ordered     enoxaparin injection 40 mg  Daily         03/17/25 1302     IP VTE HIGH RISK PATIENT  Once         03/17/25 1302     Place sequential compression device  Until discontinued         03/17/25 1302                    Discharge Planning   ALKA:      Code Status: DNR   Medical Readiness for Discharge Date:   Discharge Plan A: Rehab                    Manuelito Clark MD  Department of Hospital Medicine   [facility]

## 2025-03-19 NOTE — SEDATION DOCUMENTATION
Procedure completed at this time. VSS, NADN, and pt tolerated procedure well. Band aid to right lateral back puncture site C/D/I.

## 2025-03-19 NOTE — PLAN OF CARE
Bed mobility mod A  Seated scoot min A  Sit to stand with RW x2 trials mod A x2 with severe posterior weight shift onto heels  Seated AAROM exercises  Difficulty staying awake, lethargic  Returned to supine mod A     Recommend low intensity therapy at DC

## 2025-03-19 NOTE — PLAN OF CARE
Nutrition Recommendations/Interventions on 3/19/25:  Resume liberalized regular diet as tolerated once medically appropriate. Texture/consistency per SLP recommendations.   Will send vanilla Boost Plus BID for pt to try. Will monitor calcium level and change to Nepro, if needed.  Monitor % intake meals + supplements, weight, labs.    Goals:   Goal: Consume % of meals/snacks by follow-up. (new)  Goal: Maintain weight throughout hospitalization. (new)      Itzel Corona, MAIKEL, LDN, CNSC

## 2025-03-19 NOTE — CONSULTS
Chart reviewed by Dr. Wisdom.       ASSESSMENT/PLAN:    Right renal mass    The order for a biopsy has been placed and the procedure will be performed asap.          Thank you for the consult.

## 2025-03-19 NOTE — OP NOTE
Pre Op Diagnosis: renal mass     Post Op Diagnosis: same     Procedure:  biopsy     Procedure performed by: Alyx BRAY, Teddy SEGURA     Written Informed Consent Obtained: Yes     Specimen Removed:  yes     Estimated Blood Loss:  minimal     Findings: Local anesthesia     Sedation:  yes     The patient tolerated the procedure well and there were no complications.      Disposition:  F/U in clinic or with ordering physician    Discharge instructions:  Light activity for 24 hours.  Remove band aid in 24 hours.  No baths (showers are appropriate).      Sterile technique was performed in the RUQ, lidocaine was used as a local anesthetic.  Multiple samples taken percutaneously from the renal mass.  Pt tolerated the procedure well without immediate complications.  Please see radiologist report for details. F/u with PCP and/or ordering physician.

## 2025-03-19 NOTE — PT/OT/SLP PROGRESS
Occupational Therapy   Treatment    Name: Neymar Victoria  MRN: [id number]  Admitting Diagnosis:  Hypercalcemia of malignancy       Recommendations:     Discharge Recommendations: Low Intensity Therapy (with 24/7 care)  Discharge Equipment Recommendations:  wheelchair, walker, rolling  Barriers to discharge:       Assessment:     Neymar Victoria is a 74 y.o. male with a medical diagnosis of Hypercalcemia of malignancy. Performance deficits affecting function are weakness, gait instability, decreased upper extremity function, decreased ROM, decreased lower extremity function, impaired balance, impaired endurance, decreased safety awareness, impaired self care skills, impaired cognition, impaired functional mobility, decreased coordination.     Rehab Prognosis:  Fair; patient would benefit from acute skilled OT services to address these deficits and reach maximum level of function.       Plan:     Patient to be seen 2 x/week to address the above listed problems via self-care/home management, therapeutic activities, therapeutic exercises  Plan of Care Expires: 05/01/25  Plan of Care Reviewed with: patient, spouse    Subjective     Chief Complaint: fatigue  Patient/Family Comments/goals: rest  Pain/Comfort:  Pain Rating 1: 0/10  Pain Rating Post-Intervention 1: 0/10    Objective:     Communicated with: Nurse prior to session.  Patient found supine with telemetry, bed alarm, peripheral IV (AVASYS) upon OT entry to room.    General Precautions: Standard, fall    Orthopedic Precautions:N/A  Braces: N/A  Respiratory Status: Room air     Occupational Performance:     Bed Mobility:    Patient completed Rolling/Turning to Left with  moderate assistance  Patient completed Scooting/Bridging with moderate assistance  Patient completed Supine to Sit with moderate assistance  Patient completed Sit to Supine with moderate assistance     Functional Mobility/Transfers:  Patient completed Sit <> Stand Transfer with moderate assistance and of 2  persons  with  rolling walker   Functional Mobility: Attempted left lateral side steps with RW, however unsafe, required mod A x2      [facility] 6 Click ADL: 14    Treatment & Education:  Pt stood with RW mod A x2, x2 trials with severe posterior weight shift onto heels. Pt lethargic at times, requiring verbal cues to stay alert to participate. Pt completed AARLAYNE BUE's seated EOB x10 minutes, with min A for sitting balance.   Pt returned to supine with all needs met. Pt and patient's family encouraged to notify nursing staff to sit EOB/HOB upright for all meals. Pt's family verbalized understanding.    Patient left HOB elevated with all lines intact, call button in reach, bed alarm on, and family present    GOALS:   Multidisciplinary Problems       Occupational Therapy Goals          Problem: Occupational Therapy    Goal Priority Disciplines Outcome Interventions   Occupational Therapy Goal     OT, PT/OT Progressing    Description: O.T GOALS TO BE MET BY 4-1-25  PT WILL TOLERATE 1 SET X 15 REPS B UE ROM EXERCISE  S WITH TOILET TRANSFERS  S WITH UE DRESSING                           Time Tracking:     OT Date of Treatment: 03/19/25  OT Start Time: 0805  OT Stop Time: 0830  OT Total Time (min): 25 min    Billable Minutes:Therapeutic Activity 15  Therapeutic Exercise 10    ANA Eubanks  OT/BETTINA: OT          3/19/2025

## 2025-03-19 NOTE — INTERVAL H&P NOTE
The patient has been examined and the H&P has been reviewed:    I concur with the findings and no changes have occurred since H&P was written.        Active Hospital Problems    Diagnosis  POA    *Hypercalcemia of malignancy [E83.52]  Yes    Hematuria [R31.9]  Yes    Weakness [R53.1]  Yes     to BL LE      Renal mass with pulmonary metastasis [N28.89]  Yes     By imaging most consistent with a right sided RCC with associated lung metastases. Complicated by leg weakness and hypercalcemia.      Essential hypertension [I10]  Yes     Chronic     Home medications include amlodipine-valsartan and metoprolol.        Resolved Hospital Problems   No resolved problems to display.

## 2025-03-19 NOTE — PROGRESS NOTES
ST evaluation deferred as pt remains NPO, pending biopsy.  Cognitive/functional decline reported today.  Noted hospice consult.  ST will f/u in a.m. for assessment as ordered and clinically indicated.

## 2025-03-19 NOTE — PLAN OF CARE
Resting quietly. VSS. Awakens easily. Denies pain. No distress noted. Transported back to Room via bed by hospital transporters. Report called to nurse by Rehana Sanders RN.

## 2025-03-19 NOTE — PROGRESS NOTES
"Inpatient Nutrition Assessment    Admit Date: 3/17/2025   Total duration of encounter: 2 days   Patient Age: 74 y.o.    Nutrition Recommendation/Prescription     Resume liberalized regular diet as tolerated once medically appropriate. Texture/consistency per SLP recommendations.   Will send vanilla Boost Plus BID for pt to try. Will monitor calcium level and change to Nepro, if needed.  Monitor % intake meals + supplements, weight, labs.    Communication of Recommendations: reviewed with family    Nutrition Assessment     Malnutrition Assessment/Nutrition-Focused Physical Exam       Malnutrition Level: other (see comments) (unable to determine) (03/19/25 1233)  Energy Intake (Malnutrition): other (see comments) ("no appetite" x several days per family) (03/19/25 1233)  Weight Loss (Malnutrition): greater than 20% in 1 year (20.9% in < 1 year per chart review) (03/19/25 1233)  Subcutaneous Fat (Malnutrition): other (see comments) (unable to assess - dietitian working remotely) (03/19/25 1233)           Muscle Mass (Malnutrition): other (see comments) (unable to assess - dietitian working remotely) (03/19/25 1233)                          Fluid Accumulation (Malnutrition): other (see comments) (no edema per flowsheets) (03/19/25 1233)        A minimum of two characteristics is recommended for diagnosis of either severe or non-severe malnutrition.    Chart Review    Reason Seen: malnutrition screening tool (MST)    Malnutrition Screening Tool Results   Have you recently lost weight without trying?: Unsure  Have you been eating poorly because of a decreased appetite?: Yes   MST Score: 3   Diagnosis:  Hypercalcemia of malignancy  Essential hypertension  Weakness  Renal mass with pulmonary metastasis  Hematuria     Relevant Medical History: HTN, HLD    Scheduled Medications:  amLODIPine, 10 mg, QHS  cefTRIAXone (Rocephin) IV (PEDS and ADULTS), 1 g, Q24H  enoxparin, 40 mg, Daily  furosemide (LASIX) injection, 20 mg, " "Q12H  lactulose, 20 g, Daily  LIDOcaine, 1 patch, Q24H  magnesium oxide, 400 mg, BID  potassium chloride, 40 mEq, BID  valsartan, 320 mg, QHS    Continuous Infusions:  0.9% NaCl, Last Rate: 125 mL/hr at 03/18/25 1959    PRN Medications:  acetaminophen, 650 mg, Q4H PRN  dextrose 50%, 12.5 g, PRN  dextrose 50%, 25 g, PRN  glucagon (human recombinant), 1 mg, PRN  glucose, 16 g, PRN  glucose, 24 g, PRN  hydrALAZINE, 10 mg, Q6H PRN  ketorolac, 15 mg, Q6H PRN  melatonin, 6 mg, Nightly PRN  morphine, 1 mg, Q12H PRN  naloxone, 0.02 mg, PRN  ondansetron, 8 mg, Q8H PRN  oxyCODONE, 5 mg, Q6H PRN  promethazine, 25 mg, Q6H PRN  sodium chloride 0.9%, 10 mL, Q8H PRN  zolpidem, 5 mg, Nightly PRN    Calorie Containing IV Medications: no significant kcals from medications at this time    Recent Labs   Lab 03/14/25  1419 03/17/25  0647 03/18/25  0650 03/18/25  1704 03/19/25  0604   * 135* 138 136 136   K 3.0* 3.1* 2.9* 3.6 3.3*   CALCIUM 13.7* 14.8* 11.0* 11.4* 10.2   MG  --  2.0 1.7  --   --    CL 93* 95 102 101 105   CO2 26 28 24 22* 18*   BUN 12 15 12 11 13   CREATININE 0.9 0.9 0.8 1.0 1.0   EGFRNORACEVR >60 >60 >60 >60 >60   BILITOT 0.6 0.6 0.5 0.5 0.4   ALKPHOS 93 88 92 92 89   ALT 26 18 18 18 15   AST 20 17 29 28 24   ALBUMIN 3.0* 2.8* 2.6* 2.6* 2.3*   WBC 8.73 7.48  --   --   --    HGB 10.8* 10.8*  --   --   --    HCT 33.6* 33.8*  --   --   --      Nutrition Orders:  Diet NPO      Appetite/Oral Intake: poor/not applicable (currently NPO but no % intakes documented)  Factors Affecting Nutritional Intake: decreased appetite  Social Needs Impacting Access to Food: none identified  Food/Orthodoxy/Cultural Preferences: none reported  Food Allergies: no known food allergies  Last Bowel Movement: 03/17/25  Wound(s):  no pressure injuries per EMR    Comments    3/19/25  Dietitian working remotely. Able to speak with family over phone. Family reports "no appetite" for the last several days. Pt is currently NPO but previously on " "regular diet. No % intakes documented in flowsheets since admit. SLP eval is pending. Family reports 30 lb weight loss over the last couple months. Per chart review, pt with 20.9% weight loss in < 1 year. NFPE to be completed by onsite RD at follow-up. Discussed supplements - pt has not tried ONS before, family agreeable to trial of vanilla Boost.    Anthropometrics    Height: 5' 6" (167.6 cm), Height Method: Stated  Last Weight: 65.3 kg (143 lb 15.4 oz) (03/19/25 0536), Weight Method: Bed Scale  BMI (Calculated): 23.2  BMI Classification: normal (BMI 18.5-24.9)        Ideal Body Weight (IBW), Male: 142 lb     % Ideal Body Weight, Male (lb): 104.18 %                          Usual Weight Provided By: family/caregiver and EMR weight history    Wt Readings from Last 5 Encounters:   03/19/25 65.3 kg (143 lb 15.4 oz)   03/14/25 67.4 kg (148 lb 9.4 oz)   02/26/25 69.7 kg (153 lb 10.6 oz)   02/24/25 70.9 kg (156 lb 3.2 oz)   02/11/25 72 kg (158 lb 11.7 oz)   1/28/25: 166 lb  12/20/24: 172 lb  11/20/24: 173 lb  7/31/24: 181 lb  1/30/24: 180 lb     Weight Change(s) Since Admission:   Wt Readings from Last 1 Encounters:   03/19/25 0536 65.3 kg (143 lb 15.4 oz)   03/17/25 1645 67.1 kg (147 lb 14.9 oz)   03/17/25 0628 67.1 kg (148 lb)   Admit Weight: 67.1 kg (148 lb) (03/17/25 0628), Weight Method: Bed Scale    Estimated Needs    Weight Used For Calorie Calculations: 65.3 kg (143 lb 15.4 oz)  Energy Calorie Requirements (kcal): 8932-9861 kcal (30-35 kcal/kg) - significant weight loss, dx  Energy Need Method: Kcal/kg  Weight Used For Protein Calculations: 65.3 kg (143 lb 15.4 oz)  Protein Requirements:  g (1.5 g/kg) - significant weight loss, dx  Fluid Requirements (mL): 1178-4051 mL (25 mL/kg) or per MD  CHO Requirement: 245-285 g (50% estimated needs)     Enteral Nutrition     Patient not receiving enteral nutrition at this time.    Parenteral Nutrition     Patient not receiving parenteral nutrition support at this " time.    Evaluation of Received Nutrient Intake    Calories: not meeting estimated needs  Protein: not meeting estimated needs    Patient Education     Not applicable.    Nutrition Diagnosis     PES: Inadequate energy intake related to inability to consume sufficient nutrients as evidenced by decreased appetite/intake x several days PTA per family and significant weight loss per chart review. (new)       Nutrition Interventions     Intervention(s): general/healthful diet, commercial beverage, and collaboration with other providers      Goal: Consume % of meals/snacks by follow-up. (new)  Goal: Maintain weight throughout hospitalization. (new)    Nutrition Goals & Monitoring     Dietitian will monitor: food and beverage intake, energy intake, weight, electrolyte/renal panel, and gastrointestinal profile  Discharge planning: resume home regimen with Boost Plus supplements PRN  Nutrition Risk/Follow-Up: high (follow-up in 1-4 days)   Please consult if re-assessment needed sooner.

## 2025-03-19 NOTE — PT/OT/SLP PROGRESS
"Physical Therapy  Treatment    Neymar Victoria   MRN: [id number]   Admitting Diagnosis: Hypercalcemia of malignancy    PT Received On: 03/19/25  PT Start Time: 0810     PT Stop Time: 0835    PT Total Time (min): 25 min       Billable Minutes:  Therapeutic Activity 15 and Therapeutic Exercise 10    Treatment Type: Treatment  PT/PTA: PT     Number of PTA visits since last PT visit: 0       General Precautions: Standard, fall  Orthopedic Precautions: N/A  Braces: N/A  Respiratory Status: Room air         Subjective:  Communicated with NURSE WILLIAM AND [facility] CHART REVIEW  prior to session.   PT AGREED TO TX WITH ENCOURAGEMENT AND WIFE PRESENT.     Pain/Comfort  Pain Rating 1: 0/10  Pain Rating Post-Intervention 1: 0/10    Objective:   Patient found with: peripheral IV, telemetry, bed alarm, Other (comments) (AVASYS)    Functional Mobility:  PT MET IN RM SUP IN BED. PT LETHARGIC. PT WIFE REPORTED IN AND OUT OF SLEEP. P.T. EDUCATED PT WIFE ON SITTING HOB UP AND WINDOW SHADE OPEN DURING DAY TO ASSIST IN PROPPER SLEEP CYCLES FOR PT. PT LOG ROLLED TO LEFT AND SEATED EOB WITH MOD A. PT SCOOTED TO EOB WITH MOD A AND POST LEAN NOTED. PT WITH INC TIME TO ATTEND TO TASKS AND STEP BY STEP CUES  FOR B LE TKE X 10 REPS FOR LE ROM / STRENGTHENING. GT. BELT AND  SOCKS DONNED PRIOR TO OOB MOBILITY. PT STOOD WITH RW AND POST LEAN WITH MOD A X 2 WITH CUES FOR ANT WT SHIFT. PT UNABLE TO FOLLOW CUES FOR ANT WT SHIFT. PT SEATED FOR REST. 2ND STAND REQUIRED MOD AX 2 AND PT SIDE STEPPED TO LEFT WITH MOD A X 3X . PT RETURNED SEATED EOB . PT UNABLE TO ADVANCE WITH GT TRAINING TODAY. PT RETURNED SUP IN BED AND SCOOTED TO HOB WITH BED IN [place] AND CGA.     Treatment and Education:  PT EDUCATED ON "CALL DON'T FALL", ENCOURAGED TO CALL FOR ASSISTANCE WITH ALL NEEDS FOR OOB MOBILITY.       AM-PAC 6 CLICK MOBILITY  How much help from another person does this patient currently need?   1 = Unable, Total/Dependent Assistance  2 = A lot, " Maximum/Moderate Assistance  3 = A little, Minimum/Contact Guard/Supervision  4 = None, Modified [place]/Independent    Turning over in bed (including adjusting bedclothes, sheets and blankets)?: 2  Sitting down on and standing up from a chair with arms (e.g., wheelchair, bedside commode, etc.): 2  Moving from lying on back to sitting on the side of the bed?: 2  Moving to and from a bed to a chair (including a wheelchair)?: 1  Need to walk in hospital room?: 1  Climbing 3-5 steps with a railing?: 1  Basic Mobility Total Score: 9    AM-PAC Raw Score CMS G-Code Modifier Level of Impairment Assistance   6 % Total / Unable   7 - 9 CM 80 - 100% Maximal Assist   10 - 14 CL 60 - 80% Moderate Assist   15 - 19 CK 40 - 60% Moderate Assist   20 - 22 CJ 20 - 40% Minimal Assist   23 CI 1-20% SBA / CGA   24 CH 0% Independent/ Mod I     Patient left HOB elevated with call button in reach and bed alarm on.    Assessment:  PT WITH INC FATIGUE , LETHARGY NOTED AND POST LEAN. PT UNABLE TO PRACTICE GT THIS TX SESSION     Rehab identified problem list/impairments: weakness, impaired endurance, impaired self care skills, impaired functional mobility, gait instability, impaired balance, decreased safety awareness, decreased lower extremity function, decreased upper extremity function, decreased coordination, impaired cognition, decreased ROM, impaired muscle length    Rehab potential is fair.    Activity tolerance: Poor    Discharge recommendations: Low Intensity Therapy (24 HOUR CARE)      Barriers to discharge:      Equipment recommendations: wheelchair, walker, rolling (TBD)     GOALS:   Multidisciplinary Problems       Physical Therapy Goals          Problem: Physical Therapy    Goal Priority Disciplines Outcome Interventions   Physical Therapy Goal     PT, PT/OT Not Progressing    Description: LT25  1. PT WILL COMPLETE BED MOBILITY WITH SBA.   2. PT WILL STAND T/F TO CHAIR WITH RW AND SBA FOR OOB TO CHAIR.  3. PT  WILL GT TRAIN X 150' WITH RW AND SBA TO PROGRESS GT.  4. PT WILL INC AMPAC SCORE BY 2 POINTS TO PROGRESS GROSS FUNC MOBILITY.                            PLAN:    Patient to be seen 3 x/week to address the above listed problems via therapeutic activities, gait training, therapeutic exercises  Plan of Care expires: 04/01/25  Plan of Care reviewed with: patient, spouse         03/19/2025

## 2025-03-19 NOTE — PLAN OF CARE
Received patient from procedure via stretcher. VVS. Breathing even and unlabored. No complaints of pain, nausea, or headache. Procedure to right lateral back, bandage CDI. Family updated with patients status. Patient resting on stretcher.

## 2025-03-19 NOTE — ASSESSMENT & PLAN NOTE
Patient's blood pressure range in the last 24 hours was: BP  Min: 132/69  Max: 172/77.The patient's inpatient anti-hypertensive regimen is listed below:  Current Antihypertensives  amLODIPine tablet 10 mg, Nightly, Oral  valsartan tablet 320 mg, Nightly, Oral  hydrALAZINE injection 10 mg, Every 6 hours PRN, Intravenous  furosemide injection 20 mg, Every 12 hours, Intravenous    Plan  - BP is uncontrolled, will adjust as follows: mildly elevated, receiving fluids, home medication(s) resumed  - intravenous prn hydralazine

## 2025-03-19 NOTE — ASSESSMENT & PLAN NOTE
Hypercalcemia is likely due to Malignancy. The patient has the following symptoms due to their hypercalcemia: weakness. Their most recent calcium and albumin results are listed below.  Recent Labs     03/18/25  0650 03/18/25  1704 03/19/25  0604   CALCIUM 11.0* 11.4* 10.2   ALBUMIN 2.6* 2.6* 2.3*     Plan  - Obtain the following labs to work up the cause of hypercalcemia: PTH   PTH, Intact   Date Value Ref Range Status   03/17/2025 5.1 (L) 9.0 - 77.0 pg/mL Final    .   - Treat the hypercalcemia with: IV fluids ordered at a rate of 125 ml/hr and lasix; received zometa and bolus.   - The patient's hypercalcemia is stable.   - repeat calcium with improvement  Mentation remains altered

## 2025-03-19 NOTE — ASSESSMENT & PLAN NOTE
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

## 2025-03-19 NOTE — PLAN OF CARE
Problem: Adult Inpatient Plan of Care  Goal: Plan of Care Review  Outcome: Progressing     Problem: Adult Inpatient Plan of Care  Goal: Optimal Comfort and Wellbeing  Outcome: Progressing   Purposeful rounding   Call light within reach   Bed in lowest position   Free from falls  Meds given per doc order   Abx given per doc order  Son at bedside    Chart check complete

## 2025-03-20 ENCOUNTER — TELEPHONE (OUTPATIENT)
Dept: INTERNAL MEDICINE | Facility: CLINIC | Age: 75
End: 2025-03-20
Payer: MEDICARE

## 2025-03-20 ENCOUNTER — TELEPHONE (OUTPATIENT)
Dept: HEMATOLOGY/ONCOLOGY | Facility: CLINIC | Age: 75
End: 2025-03-20
Payer: MEDICARE

## 2025-03-20 VITALS
HEIGHT: 66 IN | HEART RATE: 86 BPM | RESPIRATION RATE: 18 BRPM | WEIGHT: 143.94 LBS | OXYGEN SATURATION: 94 % | TEMPERATURE: 98 F | DIASTOLIC BLOOD PRESSURE: 77 MMHG | BODY MASS INDEX: 23.13 KG/M2 | SYSTOLIC BLOOD PRESSURE: 174 MMHG

## 2025-03-20 LAB
ALBUMIN SERPL BCP-MCNC: 2.5 G/DL (ref 3.5–5.2)
ALP SERPL-CCNC: 103 U/L (ref 40–150)
ALT SERPL W/O P-5'-P-CCNC: 21 U/L (ref 10–44)
ANION GAP SERPL CALC-SCNC: 12 MMOL/L (ref 8–16)
AST SERPL-CCNC: 28 U/L (ref 10–40)
BILIRUB SERPL-MCNC: 0.4 MG/DL (ref 0.1–1)
BUN SERPL-MCNC: 14 MG/DL (ref 8–23)
CALCIUM SERPL-MCNC: 9.3 MG/DL (ref 8.7–10.5)
CHLORIDE SERPL-SCNC: 106 MMOL/L (ref 95–110)
CO2 SERPL-SCNC: 20 MMOL/L (ref 23–29)
CREAT SERPL-MCNC: 0.8 MG/DL (ref 0.5–1.4)
EST. GFR  (NO RACE VARIABLE): >60 ML/MIN/1.73 M^2
GLUCOSE SERPL-MCNC: 81 MG/DL (ref 70–110)
MAGNESIUM SERPL-MCNC: 1.6 MG/DL (ref 1.6–2.6)
POTASSIUM SERPL-SCNC: 3 MMOL/L (ref 3.5–5.1)
PROT SERPL-MCNC: 6.2 G/DL (ref 6–8.4)
PTH RELATED PROT SERPL-SCNC: 7.3 PMOL/L
SODIUM SERPL-SCNC: 138 MMOL/L (ref 136–145)

## 2025-03-20 PROCEDURE — 63600175 PHARM REV CODE 636 W HCPCS: Performed by: FAMILY MEDICINE

## 2025-03-20 PROCEDURE — 63600175 PHARM REV CODE 636 W HCPCS: Performed by: NURSE PRACTITIONER

## 2025-03-20 PROCEDURE — 83735 ASSAY OF MAGNESIUM: CPT | Performed by: FAMILY MEDICINE

## 2025-03-20 PROCEDURE — 25000003 PHARM REV CODE 250: Performed by: FAMILY MEDICINE

## 2025-03-20 PROCEDURE — 97110 THERAPEUTIC EXERCISES: CPT

## 2025-03-20 PROCEDURE — 80053 COMPREHEN METABOLIC PANEL: CPT | Performed by: FAMILY MEDICINE

## 2025-03-20 PROCEDURE — 36415 COLL VENOUS BLD VENIPUNCTURE: CPT | Performed by: FAMILY MEDICINE

## 2025-03-20 PROCEDURE — 97530 THERAPEUTIC ACTIVITIES: CPT

## 2025-03-20 RX ORDER — POTASSIUM CHLORIDE 20 MEQ/1
40 TABLET, EXTENDED RELEASE ORAL 2 TIMES DAILY
Qty: 8 TABLET | Refills: 0 | Status: SHIPPED | OUTPATIENT
Start: 2025-03-20 | End: 2025-03-22

## 2025-03-20 RX ORDER — POTASSIUM CHLORIDE 20 MEQ/1
40 TABLET, EXTENDED RELEASE ORAL 2 TIMES DAILY
Start: 2025-03-20 | End: 2025-03-20

## 2025-03-20 RX ADMIN — KETOROLAC TROMETHAMINE 15 MG: 30 INJECTION, SOLUTION INTRAMUSCULAR at 02:03

## 2025-03-20 RX ADMIN — POTASSIUM CHLORIDE 40 MEQ: 1500 TABLET, EXTENDED RELEASE ORAL at 10:03

## 2025-03-20 RX ADMIN — SODIUM CHLORIDE: 9 INJECTION, SOLUTION INTRAVENOUS at 01:03

## 2025-03-20 RX ADMIN — SODIUM CHLORIDE: 9 INJECTION, SOLUTION INTRAVENOUS at 05:03

## 2025-03-20 RX ADMIN — LORAZEPAM 0.5 MG: 2 INJECTION INTRAMUSCULAR; INTRAVENOUS at 01:03

## 2025-03-20 RX ADMIN — FUROSEMIDE 20 MG: 10 INJECTION, SOLUTION INTRAMUSCULAR; INTRAVENOUS at 10:03

## 2025-03-20 NOTE — PROGRESS NOTES
ST attempted to complete evaluation this AM. Communicated w RN prior who reported hospice rep in room w pt and family at this time. RN also endorses family at bedside w confusion/agitation regarding pt status/prognosis. He reports pt/family w/o c/o dysphagia and appreciated pt w intake of water w/o difficulty. ST will defer assessment at this time and follow-up for assessment as ordered and clinically indicated.     Jacquelyn Akhtar MA, PL-SLP, CCC-SLP  Speech Language Pathologist  3/20/2025

## 2025-03-20 NOTE — PLAN OF CARE
Pt tolerated Tx session well. Pt displays confusion but was able to orient to person and place. BM CGA, Sup 2 sit Min A, FM 40ft Min A, SPT to chair Min A.    Family agitated on confusion regarding placement and direction of treatment outside therapy

## 2025-03-20 NOTE — TELEPHONE ENCOUNTER
----- Message from SWAPNA Weems sent at 3/20/2025 12:02 PM CDT -----  Contact: [phone] Pts Spouse    ----- Message -----  From: Michell Hightower  Sent: 3/20/2025   9:35 AM CDT  To: Jason Contreras Staff    .[email or web address] Patient is calling for Medical Advice regarding: Pts spouse is calling in regards to the pts appointment. Pts spouse stated the pt was supposed to have a virtual appointment today but he does not have one listed on his chart. Pts spouse would like someone to give her a call back please. Please call and advise. Thank youHow long has patient had these symptoms:N/APharmacy name and phone#: N/APatient wants a call back or thru [facility]:Comments:Please advise patient replies from provider may take up to 48 hours.

## 2025-03-20 NOTE — CHAPLAIN
Visited with patient's family to determine supported needed. Pt.'s family focused on care planning for him and not able to speak extensively at the time. Spiritual care remains available as needed.     Julien   intern, Parveen [place]

## 2025-03-20 NOTE — PLAN OF CARE
monica: Adult Inpatient Plan of Care  Goal: Plan of Care Review  Outcome: Progressing     Problem: Adult Inpatient Plan of Care  Goal: Optimal Comfort and Wellbeing  Outcome: Progressing   Purposeful rounding   Call light within reach   Bed in lowest position   Free from falls  Meds given per doc order   Abx given per doc order  Son at bedside    Chart check complete

## 2025-03-20 NOTE — PLAN OF CARE
Sw received a call from pt's spouse, Alexandra, to discuss sending a referral to an alternate hospice agency at this time. Alexandra wants Sw to send referral to [facility] for information visit.     Bozena sent referral and notified Roxane.     [id number] Bozena notified by Evangelina with [facility] that family wants to speak with Attending MD, Dr. Clark, prior to signing consents with hospice. Evangelina stated she will f/u with family later this afternoon after meeting with MD. Evangelina also stated referral for [facility] is to discuss hospice care in [place] when family transitions from  to [place] in a few weeks.      03/20/25 1152   Rounds   Attendance Provider;;Charge nurse;Physical therapist   Discharge Plan A Hospice/home   Why the patient remains in the hospital Social issues   Transition of Care Barriers None

## 2025-03-20 NOTE — ASSESSMENT & PLAN NOTE
Hypercalcemia is likely due to Malignancy. The patient has the following symptoms due to their hypercalcemia: weakness. Their most recent calcium and albumin results are listed below.  Recent Labs     03/18/25  1704 03/19/25  0604 03/20/25  0638   CALCIUM 11.4* 10.2 9.3   ALBUMIN 2.6* 2.3* 2.5*     Plan  - Obtain the following labs to work up the cause of hypercalcemia: PTH   PTH, Intact   Date Value Ref Range Status   03/17/2025 5.1 (L) 9.0 - 77.0 pg/mL Final    .   - Treat the hypercalcemia with: IV fluids ordered at a rate of 125 ml/hr and lasix; received zometa and bolus.   - The patient's hypercalcemia is stable.   - repeat calcium with improvement  Mentation remains altered      WDL

## 2025-03-20 NOTE — PT/OT/SLP PROGRESS
Occupational Therapy   Treatment    Name: Neymar Victoria  MRN: [id number]  Admitting Diagnosis:  Hypercalcemia of malignancy       Recommendations:     Discharge Recommendations: Low Intensity Therapy  Discharge Equipment Recommendations:  wheelchair, walker, rolling  Barriers to discharge:  None    Assessment:     Neymar Victoria is a 74 y.o. male with a medical diagnosis of Hypercalcemia of malignancy.  He presents with the following performance deficits affecting function are weakness, impaired endurance, impaired self care skills, impaired functional mobility, gait instability, impaired skin, decreased coordination, impaired cognition, impaired balance, decreased upper extremity function, decreased lower extremity function, decreased ROM.     Rehab Prognosis:  Fair; patient would benefit from acute skilled OT services to address these deficits and reach maximum level of function.       Plan:     Patient to be seen 2 x/week to address the above listed problems via self-care/home management, therapeutic activities, therapeutic exercises  Plan of Care Expires: 05/01/25  Plan of Care Reviewed with: patient, spouse, caregiver, son    Subjective     Chief Complaint: Weakness  Patient/Family Comments/goals: Get correct information regarding discharge and Tx  Pain/Comfort:  Pain Rating 1: 0/10    Objective:     Communicated with: Talib and JOSE prior to session.  Patient found HOB elevated with telemetry, bed alarm, peripheral IV upon OT entry to room.    General Precautions: Standard, fall    Orthopedic Precautions:N/A  Braces: N/A  Respiratory Status: Room air     Occupational Performance:     Bed Mobility:    Patient completed Rolling/Turning to Right with contact guard assistance  Patient completed Scooting/Bridging with contact guard assistance  Patient completed Supine to Sit with minimum assistance     Functional Mobility/Transfers:  Patient completed Sit <> Stand Transfer with minimum assistance  with  rolling walker    Patient completed Stand w/ RW <> Chair Transfer using Stand Pivot technique with minimum assistance with rolling walker  Functional Mobility: Patient completed x40ft functional mobility Min A w/ RW to increase dynamic standing balance and activity tolerance needed for ADL completion.  Required increase time d/t safety and fatigue  Pt displays confusion        AMPAC 6 Click ADL: 14    Treatment & Education:  Pt tolerated session well but displays confusion over TE and FM. Pt completed therex with therapist but required mod A for correct movement and on completion.  Pt completed the following Therex 1x10 in order to increase BM and FM:      Elbow Flexion   Hand squeezes  OT role, plan of care, progression of goals, importance of continued OOB activity, ADL/functional transfer and mobility retraining, call don't fall, safety precautions, fall prevention. Pt educated on sitting in chair for 1 hour during breakfast lunch and dinner in order to change positions, regulate BP and reduce bed sores.   Pt acknowledges and agrees with POC given by OT.      Patient left up in chair with all lines intact, call button in reach, chair alarm on, and Family present    GOALS:   Multidisciplinary Problems       Occupational Therapy Goals          Problem: Occupational Therapy    Goal Priority Disciplines Outcome Interventions   Occupational Therapy Goal     OT, PT/OT Progressing    Description: O.T GOALS TO BE MET BY 4-1-25  PT WILL TOLERATE 1 SET X 15 REPS B UE ROM EXERCISE  S WITH TOILET TRANSFERS  S WITH UE DRESSING                         Time Tracking:     OT Date of Treatment: 03/20/25  OT Start Time: 0810  OT Stop Time: 0835  OT Total Time (min): 25 min    Billable Minutes:Therapeutic Activity 15  Therapeutic Exercise 10    OT/BETTINA: BETTINA     Number of BETTINA visits since last OT visit: 1  A client care conference was performed between the ANA and ALTAGRACIA, prior to treatment by ALTAGRACIA, to discuss the patient's status, treatment plan and  established goals.  ALTAGRACIA Posey    3/20/2025

## 2025-03-20 NOTE — TELEPHONE ENCOUNTER
----- Message from Faustina sent at 3/20/2025 10:20 AM CDT -----  Type:  Patient Returning CallWho Called:WifeJai Left Message for Patient:Does the patient know what this is regarding?:PatientWould the patient rather a call back or a response via [facility]? callbackBest Call Back Number:[phone] Information: Need a callback concerning patient being in the hospital

## 2025-03-20 NOTE — DISCHARGE SUMMARY
[facility] Medicine  Discharge Summary      Patient Name: Neymar Victoria  MRN: [id number]  URIAH: [id number]  Patient Class: IP- Inpatient  Admission Date: 3/17/2025  Hospital Length of Stay: 2 days  Discharge Date and Time:  03/20/2025 1:15 PM  Attending Physician: Manuelito Clark MD   Discharging Provider: Manuelito Clark MD  Primary Care Provider: Susan Chávez MD    Primary Care Team: Networked reference to record PCT     HPI:   74M  has a past medical history of Arthritis, Early dry stage nonexudative age-related macular degeneration of both eyes, Erectile dysfunction, Hyperlipidemia LDL goal < 160, Hypertension, and Seasonal allergies.  Presents to emergency department for generalized weakness and falls. Associated with back pain and weight loss. Recently seen in hem/onc for renal cell carcinoma with pulmonary mets. Denies bladder or bowel incontinence. Neighbor present and endorses improvement in mentation since being evaluated in emergency department. Does not wear supplemental oxygen at baseline.     Initial workup in emergency department reveals hypertension, hypoxia on room air, improved with nasal cannula. Afebrile. Cbc with normocytic anemia. Cmp showing mild hyponatremia, hypokalemia and hypercalcemia. Bnp, ck, and troponin(s) within normal limits. Pth 5.1 urinalysis with hematuria. Ct head without contrast with chronic microvascular ischemic findings. Ct lumbar spine without contrast showing large right renal mass with pulmonary mets but no bony mets and L5-S1 foraminal stenosis. Chest x-ray with no active disease. Xray pelvis with on fracture. Mri lumbar spine with and without contrast showing same as ct lumbar spine. Us thyroid with no definite parathyroid tissue.    Hospital medicine consulted for hypercalcemia of malignancy. Hem/onc consulted.    * No surgery found *      Hospital Course:   3/18 admitted for generalized weakness, falls, confusion related to hypercalcemia of  malignancy. Plans for mri brain and renal biopsy per urology and oncology. Interventional radiology aware and plans for tomorrow. Physical/occupational therapy recommending rehab.   3/19 hypercalcemia improving. interventional radiology consulted for renal mass and underwent biopsy. Await results in approximately 5 days. Physical/occupational therapy recommending low intensity therapy. Mri brain with concerns for mets to brain. Hospice info visit based on urology recommendations. Wife uncertain of next steps but likely will want to follow up with primary oncologist.    3/20  After discussing with primary oncologist the patient's functional status, mental decline despite correction of hypercalcemia and additional studies showing brain lesion on mri (renal mass biopsy results still pending), hospice recommended. I shared with family members the patient's diagnosis of stage 4 renal cell carcinoma with distant metastases and primary oncologist's recommendations during family meeting. Patient unable to participate. Case management consulted for hospice. Hospice consents signed. Patient medically stable for discharge home with hospice. Discussed with patient's wife and son that patient may pursue outpatient chemotherapy if functional status improves.    Awake but restless. Slurred speech. Underweight. Ill appearing. Weakness. In a state of decline since admission. Several team members present.    Patient seen and evaluated by me. Patient was determined to be suitable for discharge. Patient deemed stable for discharge to home with hospice.       Goals of Care Treatment Preferences:  Code Status: DNR      SDOH Screening:  The patient declined to be screened for utility difficulties, food insecurity, transport difficulties, housing insecurity, and interpersonal safety, so no concerns could be identified this admission.     Consults:   Consults (From admission, onward)          Status Ordering Provider     Inpatient consult  to Social Work/Case Management  Once        Provider:  (Not yet assigned)    Completed LEEANNE WHEELER     Inpatient consult to Interventional Radiology  Once        Provider:  Hernán Wisdom MD    Completed LEEANNE WHEELER     Inpatient consult to Social Work/Case Management  Once        Provider:  (Not yet assigned)    Completed LEEANNE WHEELER     Inpatient consult to Urology  Once        Provider:  Lc High MD    Completed LEEANNE WHEELER     Inpatient consult to [facility]  Once        Provider:  Antonia Segovia MD    Completed ANTONIA SEGOVIA            Assessment & Plan  Hypercalcemia of malignancy  Hypercalcemia is likely due to Malignancy. The patient has the following symptoms due to their hypercalcemia: weakness. Their most recent calcium and albumin results are listed below.  Recent Labs     03/18/25  1704 03/19/25  0604 03/20/25  0638   CALCIUM 11.4* 10.2 9.3   ALBUMIN 2.6* 2.3* 2.5*     Plan  - Obtain the following labs to work up the cause of hypercalcemia: PTH   PTH, Intact   Date Value Ref Range Status   03/17/2025 5.1 (L) 9.0 - 77.0 pg/mL Final    .   - Treat the hypercalcemia with: IV fluids ordered at a rate of 125 ml/hr and lasix; received zometa and bolus .   - The patient's hypercalcemia is  stable .   - repeat calcium with improvement  Mentation remains altered     Essential hypertension  Patient's blood pressure range in the last 24 hours was: BP  Min: 97/51  Max: 163/70.The patient's inpatient anti-hypertensive regimen is listed below:  Current Antihypertensives  amLODIPine tablet 10 mg, Nightly, Oral  valsartan tablet 320 mg, Nightly, Oral  hydrALAZINE injection 10 mg, Every 6 hours PRN, Intravenous  furosemide injection 20 mg, Every 12 hours, Intravenous    Plan  - BP is uncontrolled, will adjust as follows: mildly elevated, receiving fluids, home medication(s) resumed  - intravenous prn hydralazine  Weakness  Physical/occupational therapy recommending low  intensity therapy  Renal mass with pulmonary metastasis  Hem/onc and urology consulted   Awaiting renal mass biopsy results  Mri brain with probable metastatic lesion   Physical/occupational therapy recommending low intensity therapy    Hematuria  Likely due to renal cell mass  Us retroperitoneal concerning for hyperechoic mass in bladder wall  Post void bladder scan showing >300mL  Urology aware  Final Active Diagnoses:    Diagnosis Date Noted POA    PRINCIPAL PROBLEM:  Hypercalcemia of malignancy [E83.52] 03/14/2025 Yes    Hematuria [R31.9] 03/17/2025 Yes    Weakness [R53.1] 02/26/2025 Yes    Renal mass with pulmonary metastasis [N28.89] 02/26/2025 Yes    Essential hypertension [I10]  Yes     Chronic      Problems Resolved During this Admission:       Discharged Condition: stable    Disposition: Hospice/Home    Follow Up:   Follow-up Information       [facility] Follow up.    Specialties: Home Hospice, Respite Care  Contact information:  [id number] [facility] [id number] [phone]                         Patient Instructions:   No discharge procedures on file.    Significant Diagnostic Studies: Labs: CMP   Recent Labs   Lab 03/18/25  1704 03/19/25  0604 03/20/25  0638    136 138   K 3.6 3.3* 3.0*    105 106   CO2 22* 18* 20*   GLU 79 72 81   BUN 11 13 14   CREATININE 1.0 1.0 0.8   CALCIUM 11.4* 10.2 9.3   PROT 6.6 6.0 6.2   ALBUMIN 2.6* 2.3* 2.5*   BILITOT 0.5 0.4 0.4   ALKPHOS 92 89 103   AST 28 24 28   ALT 18 15 21   ANIONGAP 13 13 12   , All labs within the past 24 hours have been reviewed, and pth 5.1  Radiology: X-Ray: CXR: portable with no acute process seen and xray pelvis with no acute fracture   MRI: brain and lumbar showing renal mass and possible brain lesion  CT scan:  CT biopsy kidney, ct lumbar spine with no evidence of lumbar metastatic disease; CT head without contrast showing chronic microvascular ischemic changes  Ultrasound: retroperitoneal and thyroid  showing no findings that meet criteria for fna and hyperechoic masslike findings on retroperitoneal us  Specimen (24h ago, onward)      None            Pending Diagnostic Studies:       Procedure Component Value Units Date/Time    CT Guided Needle Placement [[id number]]     Order Status: Sent Lab Status: No result     PTH-Related Peptide [[id number]] Collected: 03/18/25 0650    Order Status: Sent Lab Status: In process Updated: 03/18/25 1529    Specimen: Blood     Specimen to Pathology, Radiology Kidney, needle biopsy [[id number]] Collected: 03/19/25 1153    Order Status: Sent Lab Status: In process Updated: 03/19/25 1305           Medications:  Reconciled Home Medications:      Medication List        PAUSE taking these medications      amlodipine-valsartan  mg per tablet  Wait to take this until your doctor or other care provider tells you to start again.  Commonly known as: EXFORGE  Take 1 tablet by mouth every evening.     lenvatinib 20 mg/day (10 mg x 2) Cap  Wait to take this until your doctor or other care provider tells you to start again.  Commonly known as: LENVIMA  Take 20 mg by mouth once daily.            CONTINUE taking these medications      bisacodyL 5 mg EC tablet  Commonly known as: DULCOLAX (BISACODYL)  Take 1 tablet (5 mg total) by mouth daily as needed for Constipation.     cyanocobalamin 1000 MCG tablet  Commonly known as: VITAMIN B-12  Take 1 tablet (1,000 mcg total) by mouth once daily.     lactulose 20 gram/30 mL Soln  Commonly known as: CHRONULAC  Take 30 mLs (20 g total) by mouth once daily.     metoprolol succinate 200 MG 24 hr tablet  Commonly known as: TOPROL-XL  Take 1 tablet (200 mg total) by mouth every evening.     ondansetron 8 MG Tbdl  Commonly known as: ZOFRAN-ODT  Take 1 tablet (8 mg total) by mouth every 8 (eight) hours as needed (nausea). Take 1 tablet (8 mg) by mouth 30 minutes prior to oral chemotherapy and every 8 hours as needed for nausea/vomiting (MAX = 3 tablets in  24 hours).     polyethylene glycol 17 gram/dose powder  Commonly known as: MIRALAX  Take 17 g by mouth 2 (two) times daily.     senna-docusate 8.6-50 mg 8.6-50 mg per tablet  Commonly known as: SENNA WITH DOCUSATE SODIUM  Take 1 tablet by mouth once daily.     SYSTANE OPHT  Apply to eye.     tadalafiL 10 MG tablet  Commonly known as: CIALIS  1 tablet at least 30 minutes prior to sexual activity; not more than once daily     traMADoL 50 mg tablet  Commonly known as: ULTRAM  Take 1 tablet (50 mg total) by mouth every 6 (six) hours as needed for Pain.              Indwelling Lines/Drains at time of discharge:   Lines/Drains/Airways       None                   Time spent on the discharge of patient: 51 minutes         Manuelito Clark MD  Department of Hospital Medicine  '[facility] - Med Surg

## 2025-03-20 NOTE — ASSESSMENT & PLAN NOTE
Patient's blood pressure range in the last 24 hours was: BP  Min: 97/51  Max: 163/70.The patient's inpatient anti-hypertensive regimen is listed below:  Current Antihypertensives  amLODIPine tablet 10 mg, Nightly, Oral  valsartan tablet 320 mg, Nightly, Oral  hydrALAZINE injection 10 mg, Every 6 hours PRN, Intravenous  furosemide injection 20 mg, Every 12 hours, Intravenous    Plan  - BP is uncontrolled, will adjust as follows: mildly elevated, receiving fluids, home medication(s) resumed  - intravenous prn hydralazine

## 2025-03-20 NOTE — PLAN OF CARE
[facility] - Med Surg  Discharge Final Note    Primary Care Provider: Susan Chávez MD    Expected Discharge Date: 3/20/2025    Final Discharge Note (most recent)       Final Note - 03/20/25 1251          Final Note    Assessment Type Final Discharge Note     Anticipated Discharge Disposition Hospice/Home        Post-Acute Status    Post-Acute Authorization HME;Hospice     HME Status Pending Delivery     Hospice Status Set-up Complete/Auth obtained     Discharge Delays Home Medical Equipment (Insurance, Delivery)                   Sw spoke with pt's spouse, Alexandra, who stated they selected [facility] for home hospice care upon d/c.     Roxane with Hospice to arrange for DME delivery today.     Plan for pt to d/c by private vehicle once hospice DME is delivered.

## 2025-03-20 NOTE — TELEPHONE ENCOUNTER
Pt wife is calling stating that pt is in hospital now and is getting discharge today or tomorrow and is getting put on hospice. Pt wife is asking is there any other option for him because she is the only that lives with him and she can not take care of him alone.

## 2025-03-20 NOTE — PLAN OF CARE
Discussed poc with pt, pt verbalized understanding    Purposeful rounding every 2hours    VS wnl   Fall precautions in place, remains injury free  Pt denies c/o any pain or discomfort at this time and patient will be discharged to home to family care.  Pain and nausea under control with PRN meds    IVFs  Accurate I&Os  Abx given as prescribed  Bed locked at lowest position  Call light within reach    Chart check complete  Will cont with POC

## 2025-03-20 NOTE — PLAN OF CARE
Discussed poc with pt, pt verbalized understanding    Purposeful rounding every 2hours    VS wnl  Fall precautions in place, remains injury free  Pt denies c/o any pain or discomfort at this time and patient will continue to monitored.  Pain and nausea under control with PRN meds    IVFs  Accurate I&Os  Abx given as prescribed  Bed locked at lowest position  Call light within reach    Chart check complete  Will cont with POC

## 2025-03-20 NOTE — TELEPHONE ENCOUNTER
Was inpatient hospice offered? I recommend discussing with hospital team to see if this would be an option for patient.

## 2025-03-21 LAB — PTH RELATED PROT SERPL-SCNC: 6.2 PMOL/L

## 2025-03-21 NOTE — PT/OT/SLP PROGRESS
Physical Therapy  Treatment    Neymar Victoria   MRN: [id number]   Admitting Diagnosis: Hypercalcemia of malignancy    PT Received On: 03/20/25  PT Start Time: 0810     PT Stop Time: 0835    PT Total Time (min): 25 min       Billable Minutes:  Gait Training 15 and Therapeutic Exercise 10    Treatment Type: Treatment  PT/PTA: PT     Number of PTA visits since last PT visit: 0       General Precautions: Standard, fall  Orthopedic Precautions: N/A  Braces: N/A  Respiratory Status: Room air         Subjective:  Communicated with nurse AND EPIC CHART REVIEW  prior to session.  PT AGREED TO TX     Pain/Comfort  Pain Rating 1: 0/10  Pain Rating Post-Intervention 1: 0/10    Objective:   Patient found with: telemetry, bed alarm, peripheral IV, Other (comments) (avasys)    Functional Mobility:    PT MET IN  AGREED TO TX. PT LOG ROLLED TO LEFT AND SEATED EOB WITH MIN A. GT. BELT AND  SOCKS DONNED PRIOR TO OOB MOBILITY. PT STOOD WITH RW AND MIN A FOR GT TRAINING.PT GT TRAINED X 40' WITH STEP TO GT AND STEP BY STEP CUES FOR SAFETY WITH RW . PT RETURNED TO  T/F TO CHAIR WITH RW AND MIN A. FOR REST BREAK.     Treatment and Education:  PT COMPLETED B LE TE X 10 REPS OF AP, TKE AND MIP FOR LE STRENGTHENING.      AM-PAC 6 CLICK MOBILITY  How much help from another person does this patient currently need?   1 = Unable, Total/Dependent Assistance  2 = A lot, Maximum/Moderate Assistance  3 = A little, Minimum/Contact Guard/Supervision  4 = None, Modified [place]/Independent    Turning over in bed (including adjusting bedclothes, sheets and blankets)?: 3  Sitting down on and standing up from a chair with arms (e.g., wheelchair, bedside commode, etc.): 3  Moving from lying on back to sitting on the side of the bed?: 3  Moving to and from a bed to a chair (including a wheelchair)?: 3  Need to walk in hospital room?: 3  Climbing 3-5 steps with a railing?: 1  Basic Mobility Total Score: 16    AM-PAC Raw Score CMS G-Code Modifier  Level of Impairment Assistance   6 % Total / Unable   7 - 9 CM 80 - 100% Maximal Assist   10 - 14 CL 60 - 80% Moderate Assist   15 - 19 CK 40 - 60% Moderate Assist   20 - 22 CJ 20 - 40% Minimal Assist   23 CI 1-20% SBA / CGA   24 CH 0% Independent/ Mod I     Patient left up in chair with call button in reach.    Assessment:  PT PROGRESSING WITH GT.     Rehab identified problem list/impairments: weakness, impaired endurance, impaired balance, decreased lower extremity function, decreased safety awareness, impaired self care skills, impaired cognition, impaired functional mobility, gait instability, decreased ROM    Rehab potential is fair.    Activity tolerance: Fair    Discharge recommendations: Low Intensity Therapy (24 hour caregiver)      Barriers to discharge:      Equipment recommendations: wheelchair, walker, rolling, hospital bed     GOALS:   Multidisciplinary Problems       Physical Therapy Goals          Problem: Physical Therapy    Goal Priority Disciplines Outcome Interventions   Physical Therapy Goal     PT, PT/OT Not Progressing    Description: LT25  1. PT WILL COMPLETE BED MOBILITY WITH SBA.   2. PT WILL STAND T/F TO CHAIR WITH RW AND SBA FOR OOB TO CHAIR.  3. PT WILL GT TRAIN X 150' WITH RW AND SBA TO PROGRESS GT.  4. PT WILL INC AMPAC SCORE BY 2 POINTS TO PROGRESS GROSS FUNC MOBILITY.                            DME Justifications:   Neymar's mobility limitation cannot be sufficiently resolved by the use of a cane. His functional mobility deficit can be sufficiently resolved with the use of a Rolling Walker. Patient's mobility limitation significantly impairs their ability to participate in one of more activities of daily living.  The use of a RW will significantly improve the patient's ability to participate in MRADLS and the patient will use it on regular basis in the home.    PLAN:    Patient to be seen 3 x/week to address the above listed problems via therapeutic activities, therapeutic  exercises, gait training  Plan of Care expires: 04/01/25  Plan of Care reviewed with: patient, spouse, son         03/20/2025

## 2025-03-24 LAB
FINAL PATHOLOGIC DIAGNOSIS: NORMAL
GROSS: NORMAL
Lab: NORMAL

## 2025-03-31 NOTE — PHYSICIAN QUERY
Due to the conflicting clinical picture, please clinically validate the diagnosis. If validated, please provide additional clinical support for the diagnosis.  Other: possible aspiration due to dysphagia, confusion, hypercalcemia and metastasis to brain

## 2025-04-28 ENCOUNTER — DOCUMENTATION ONLY (OUTPATIENT)
Dept: HEMATOLOGY/ONCOLOGY | Facility: CLINIC | Age: 75
End: 2025-04-28
Payer: MEDICARE

## 2025-04-28 NOTE — PROGRESS NOTES
SWER received message from [facility] staff that patient passed away on Friday. SWER will send to medical team and remain available.

## 2025-06-10 NOTE — PLAN OF CARE
Problem: Adult Inpatient Plan of Care  Goal: Plan of Care Review  Outcome: Progressing  Goal: Patient-Specific Goal (Individualized)  Outcome: Progressing  Goal: Absence of Hospital-Acquired Illness or Injury  Outcome: Progressing  Goal: Optimal Comfort and Wellbeing  Outcome: Progressing  Goal: Readiness for Transition of Care  Outcome: Progressing      Labs ordered.